# Patient Record
Sex: FEMALE | Race: BLACK OR AFRICAN AMERICAN | NOT HISPANIC OR LATINO | Employment: OTHER | URBAN - METROPOLITAN AREA
[De-identification: names, ages, dates, MRNs, and addresses within clinical notes are randomized per-mention and may not be internally consistent; named-entity substitution may affect disease eponyms.]

---

## 2020-08-16 ENCOUNTER — NURSE TRIAGE (OUTPATIENT)
Dept: ADMINISTRATIVE | Facility: CLINIC | Age: 75
End: 2020-08-16

## 2020-08-16 NOTE — TELEPHONE ENCOUNTER
The patient called about covid 19 testing.     Reason for Disposition   COVID-19 Testing, questions about    Additional Information   Negative: SEVERE difficulty breathing (e.g., struggling for each breath, speaks in single words)   Negative: Difficult to awaken or acting confused (e.g., disoriented, slurred speech)   Negative: Bluish (or gray) lips or face now   Negative: Shock suspected (e.g., cold/pale/clammy skin, too weak to stand, low BP, rapid pulse)   Negative: Sounds like a life-threatening emergency to the triager   Negative: [1] COVID-19 exposure AND [2] NO symptoms   Negative: COVID-19 and Breastfeeding, questions about   Negative: [1] Adult with possible COVID-19 symptoms AND [2] triager concerned about severity of symptoms or other causes   Negative: SEVERE or constant chest pain or pressure (Exception: mild central chest pain, present only when coughing)   Negative: MODERATE difficulty breathing (e.g., speaks in phrases, SOB even at rest, pulse 100-120)   Negative: Patient sounds very sick or weak to the triager   Negative: MILD difficulty breathing (e.g., minimal/no SOB at rest, SOB with walking, pulse <100)   Negative: Chest pain or pressure   Negative: Fever > 103 F (39.4 C)   Negative: [1] Fever > 101 F (38.3 C) AND [2] age > 60   Negative: [1] Fever > 100.0 F (37.8 C) AND [2] bedridden (e.g., nursing home patient, CVA, chronic illness, recovering from surgery)   Negative: HIGH RISK patient (e.g., age > 64 years, diabetes, heart or lung disease, weak immune system)   Negative: Fever present > 3 days (72 hours)   Negative: [1] Fever returns after gone for over 24 hours AND [2] symptoms worse or not improved   Negative: [1] Continuous (nonstop) coughing interferes with work or school AND [2] no improvement using cough treatment per protocol   Negative: [1] COVID-19 infection suspected by caller or triager AND [2] mild symptoms (cough, fever, or others) AND [3] no complications  or SOB   Negative: Cough present > 3 weeks   Negative: [1] COVID-19 diagnosed by positive lab test AND [2] mild symptoms (e.g., cough, fever, others) AND [3] no complications or SOB   Negative: [1] COVID-19 diagnosed by HCP (doctor, NP or PA) AND [2] mild symptoms (e.g., cough, fever, others) AND [3] no complications or SOB   Negative: COVID-19 Home Isolation, questions about    Protocols used: CORONAVIRUS (COVID-19) DIAGNOSED OR [id number]

## 2020-11-25 ENCOUNTER — HOSPITAL ENCOUNTER (EMERGENCY)
Facility: HOSPITAL | Age: 75
Discharge: HOME | End: 2020-11-25
Attending: EMERGENCY MEDICINE
Payer: MEDICARE

## 2020-11-25 VITALS
DIASTOLIC BLOOD PRESSURE: 110 MMHG | SYSTOLIC BLOOD PRESSURE: 176 MMHG | TEMPERATURE: 98 F | HEART RATE: 60 BPM | BODY MASS INDEX: 33.12 KG/M2 | HEIGHT: 67 IN | OXYGEN SATURATION: 98 % | RESPIRATION RATE: 18 BRPM | WEIGHT: 211 LBS

## 2020-11-25 DIAGNOSIS — S39.012A STRAIN OF LUMBAR REGION, INITIAL ENCOUNTER: ICD-10-CM

## 2020-11-25 DIAGNOSIS — M54.9 BACK PAIN, UNSPECIFIED BACK LOCATION, UNSPECIFIED BACK PAIN LATERALITY, UNSPECIFIED CHRONICITY: Primary | ICD-10-CM

## 2020-11-25 PROCEDURE — 96372 THER/PROPH/DIAG INJ SC/IM: CPT

## 2020-11-25 PROCEDURE — 25000003 PHARM REV CODE 250: Performed by: EMERGENCY MEDICINE

## 2020-11-25 PROCEDURE — 99284 EMERGENCY DEPT VISIT MOD MDM: CPT | Mod: 25

## 2020-11-25 PROCEDURE — 63600175 PHARM REV CODE 636 W HCPCS: Performed by: EMERGENCY MEDICINE

## 2020-11-25 RX ORDER — ONDANSETRON 4 MG/1
8 TABLET, ORALLY DISINTEGRATING ORAL
Status: COMPLETED | OUTPATIENT
Start: 2020-11-25 | End: 2020-11-25

## 2020-11-25 RX ORDER — HYDROMORPHONE HYDROCHLORIDE 1 MG/ML
1 INJECTION, SOLUTION INTRAMUSCULAR; INTRAVENOUS; SUBCUTANEOUS
Status: COMPLETED | OUTPATIENT
Start: 2020-11-25 | End: 2020-11-25

## 2020-11-25 RX ADMIN — ONDANSETRON 8 MG: 4 TABLET, ORALLY DISINTEGRATING ORAL at 12:11

## 2020-11-25 RX ADMIN — HYDROMORPHONE HYDROCHLORIDE 1 MG: 1 INJECTION, SOLUTION INTRAMUSCULAR; INTRAVENOUS; SUBCUTANEOUS at 12:11

## 2020-11-25 NOTE — ED PROVIDER NOTES
Encounter Date: 11/25/2020       History     Chief Complaint   Patient presents with    Back Pain     left lower back pain.  Saw my doctor Monday and was given some pain pills, Flexeril, and steroid but the pain is unbearable.     75-year-old black female pain of low back pain for a week now.  Was seen by her primary care physician, diagnosed with muscular back pain, a given pain medication.  Patient still complaining of pain.  Denies any trauma.  Denies any fever.  Denies any dysuria hematuria        Review of patient's allergies indicates:   Allergen Reactions    Penicillins     Sulfa (sulfonamide antibiotics)      Past Medical History:   Diagnosis Date    Asthma     Diabetes mellitus, type 2     Hypertension      Past Surgical History:   Procedure Laterality Date    HYSTERECTOMY      right knee surgery       Family History   Problem Relation Age of Onset    No Known Problems Mother     No Known Problems Father      Social History     Tobacco Use    Smoking status: Never Smoker   Substance Use Topics    Alcohol use: No    Drug use: No     Review of Systems   Constitutional: Negative for fever.   HENT: Negative for sore throat.    Respiratory: Negative for shortness of breath.    Cardiovascular: Negative for chest pain.   Gastrointestinal: Negative for nausea.   Genitourinary: Negative for dysuria.   Musculoskeletal: Positive for back pain.   Skin: Negative for rash.   Neurological: Negative for weakness.   Hematological: Does not bruise/bleed easily.   All other systems reviewed and are negative.      Physical Exam     Initial Vitals [11/25/20 1228]   BP Pulse Resp Temp SpO2   (!) 176/110 60 20 98.2 °F (36.8 °C) 98 %      MAP       --         Physical Exam    Nursing note and vitals reviewed.  Constitutional: She appears well-developed and well-nourished. She is not diaphoretic. No distress.   HENT:   Head: Normocephalic and atraumatic.   Eyes: Conjunctivae and EOM are normal. Pupils are equal, round,  and reactive to light.   Neck: Normal range of motion. Neck supple.   Cardiovascular: Normal rate, regular rhythm, normal heart sounds and intact distal pulses.   No murmur heard.  Pulmonary/Chest: Breath sounds normal. No respiratory distress. She has no wheezes. She has no rhonchi. She has no rales. She exhibits no tenderness.   Abdominal: Soft. Bowel sounds are normal.   Musculoskeletal: Normal range of motion. Tenderness present. No edema.      Comments: Bilateral paraspinous muscle tenderness low back with palpation.  Neurovascular intact, no distal cyanosis.  No sensory deficit.  Steady gait   Neurological: She is alert and oriented to person, place, and time. She has normal strength and normal reflexes. No cranial nerve deficit. GCS score is 15. GCS eye subscore is 4. GCS verbal subscore is 5. GCS motor subscore is 6.   Skin: Skin is warm and dry. Capillary refill takes less than 2 seconds.         ED Course   Procedures  Labs Reviewed - No data to display       Imaging Results    None          Medical Decision Making:   Differential Diagnosis:   Muscular low back pain                             Clinical Impression:     ICD-10-CM ICD-9-CM   1. Back pain, unspecified back location, unspecified back pain laterality, unspecified chronicity  M54.9 724.5   2. Strain of lumbar region, initial encounter  S39.012A 847.2                          ED Disposition Condition    Discharge Stable        ED Prescriptions     None        Follow-up Information     Follow up With Specialties Details Why Contact Info Additional Information    Primary care physician  In 2 days       [facility] Emergency Medicine  If symptoms worsen [address] [facility] [address]  [phone] Floor [address]                                       Craig Winters MD  11/25/20 2415

## 2020-12-14 ENCOUNTER — LAB VISIT (OUTPATIENT)
Dept: LAB | Facility: HOSPITAL | Age: 75
End: 2020-12-14
Attending: FAMILY MEDICINE
Payer: MEDICARE

## 2020-12-14 DIAGNOSIS — I10 ESSENTIAL HYPERTENSION, MALIGNANT: Primary | ICD-10-CM

## 2020-12-14 DIAGNOSIS — M54.00 PANNICULITIS AFFECTING BACK: ICD-10-CM

## 2020-12-14 DIAGNOSIS — R82.79 URINE CULTURE POSITIVE: ICD-10-CM

## 2020-12-14 LAB
ANION GAP SERPL CALC-SCNC: 7 MMOL/L (ref 8–16)
BASOPHILS # BLD AUTO: 0.07 K/UL (ref 0–0.2)
BASOPHILS NFR BLD: 0.7 % (ref 0–1.9)
BUN SERPL-MCNC: 15 MG/DL (ref 8–23)
CALCIUM SERPL-MCNC: 9.6 MG/DL (ref 8.7–10.5)
CHLORIDE SERPL-SCNC: 105 MMOL/L (ref 95–110)
CO2 SERPL-SCNC: 29 MMOL/L (ref 23–29)
CREAT SERPL-MCNC: 1.4 MG/DL (ref 0.5–1.4)
DIFFERENTIAL METHOD: ABNORMAL
EOSINOPHIL # BLD AUTO: 0.5 K/UL (ref 0–0.5)
EOSINOPHIL NFR BLD: 5 % (ref 0–8)
ERYTHROCYTE [DISTWIDTH] IN BLOOD BY AUTOMATED COUNT: 15.7 % (ref 11.5–14.5)
EST. GFR  (AFRICAN AMERICAN): 42.7 ML/MIN/1.73 M^2
EST. GFR  (NON AFRICAN AMERICAN): 37 ML/MIN/1.73 M^2
GLUCOSE SERPL-MCNC: 87 MG/DL (ref 70–110)
HCT VFR BLD AUTO: 40.9 % (ref 37–48.5)
HGB BLD-MCNC: 13.4 G/DL (ref 12–16)
IMM GRANULOCYTES # BLD AUTO: 0.02 K/UL (ref 0–0.04)
IMM GRANULOCYTES NFR BLD AUTO: 0.2 % (ref 0–0.5)
LYMPHOCYTES # BLD AUTO: 2.2 K/UL (ref 1–4.8)
LYMPHOCYTES NFR BLD: 21.2 % (ref 18–48)
MCH RBC QN AUTO: 29.5 PG (ref 27–31)
MCHC RBC AUTO-ENTMCNC: 32.8 G/DL (ref 32–36)
MCV RBC AUTO: 90 FL (ref 82–98)
MONOCYTES # BLD AUTO: 0.6 K/UL (ref 0.3–1)
MONOCYTES NFR BLD: 5.8 % (ref 4–15)
NEUTROPHILS # BLD AUTO: 6.9 K/UL (ref 1.8–7.7)
NEUTROPHILS NFR BLD: 67.1 % (ref 38–73)
NRBC BLD-RTO: 0 /100 WBC
PLATELET # BLD AUTO: 287 K/UL (ref 150–350)
PMV BLD AUTO: 10.6 FL (ref 9.2–12.9)
POTASSIUM SERPL-SCNC: 3.6 MMOL/L (ref 3.5–5.1)
RBC # BLD AUTO: 4.54 M/UL (ref 4–5.4)
SODIUM SERPL-SCNC: 141 MMOL/L (ref 136–145)
WBC # BLD AUTO: 10.32 K/UL (ref 3.9–12.7)

## 2020-12-14 PROCEDURE — 36415 COLL VENOUS BLD VENIPUNCTURE: CPT

## 2020-12-14 PROCEDURE — 80048 BASIC METABOLIC PNL TOTAL CA: CPT

## 2020-12-14 PROCEDURE — 87088 URINE BACTERIA CULTURE: CPT

## 2020-12-14 PROCEDURE — 87186 SC STD MICRODIL/AGAR DIL: CPT

## 2020-12-14 PROCEDURE — 87077 CULTURE AEROBIC IDENTIFY: CPT

## 2020-12-14 PROCEDURE — 87086 URINE CULTURE/COLONY COUNT: CPT

## 2020-12-14 PROCEDURE — 85025 COMPLETE CBC W/AUTO DIFF WBC: CPT

## 2020-12-17 LAB — BACTERIA UR CULT: ABNORMAL

## 2021-01-15 ENCOUNTER — LAB VISIT (OUTPATIENT)
Dept: LAB | Facility: HOSPITAL | Age: 76
End: 2021-01-15
Attending: FAMILY MEDICINE
Payer: MEDICARE

## 2021-01-15 DIAGNOSIS — N17.9 ACUTE KIDNEY FAILURE, UNSPECIFIED: Primary | ICD-10-CM

## 2021-01-15 LAB
ANION GAP SERPL CALC-SCNC: 13 MMOL/L (ref 8–16)
BUN SERPL-MCNC: 13 MG/DL (ref 8–23)
CALCIUM SERPL-MCNC: 9.4 MG/DL (ref 8.7–10.5)
CHLORIDE SERPL-SCNC: 103 MMOL/L (ref 95–110)
CO2 SERPL-SCNC: 29 MMOL/L (ref 23–29)
CREAT SERPL-MCNC: 1.2 MG/DL (ref 0.5–1.4)
EST. GFR  (AFRICAN AMERICAN): 51.4 ML/MIN/1.73 M^2
EST. GFR  (NON AFRICAN AMERICAN): 44.6 ML/MIN/1.73 M^2
GLUCOSE SERPL-MCNC: 90 MG/DL (ref 70–110)
POTASSIUM SERPL-SCNC: 3.4 MMOL/L (ref 3.5–5.1)
SODIUM SERPL-SCNC: 145 MMOL/L (ref 136–145)

## 2021-01-15 PROCEDURE — 36415 COLL VENOUS BLD VENIPUNCTURE: CPT

## 2021-01-15 PROCEDURE — 80048 BASIC METABOLIC PNL TOTAL CA: CPT

## 2021-03-05 ENCOUNTER — LAB VISIT (OUTPATIENT)
Dept: LAB | Facility: HOSPITAL | Age: 76
End: 2021-03-05
Attending: FAMILY MEDICINE
Payer: MEDICARE

## 2021-03-05 DIAGNOSIS — E87.6 HYPOPOTASSEMIA: ICD-10-CM

## 2021-03-05 DIAGNOSIS — E55.9 AVITAMINOSIS D: ICD-10-CM

## 2021-03-05 DIAGNOSIS — E88.810 DYSMETABOLIC SYNDROME X: ICD-10-CM

## 2021-03-05 DIAGNOSIS — E11.9 DIABETES MELLITUS WITHOUT COMPLICATION: ICD-10-CM

## 2021-03-05 DIAGNOSIS — N32.81 DETRUSOR INSTABILITY OF BLADDER: ICD-10-CM

## 2021-03-05 DIAGNOSIS — I10 ESSENTIAL HYPERTENSION, MALIGNANT: Primary | ICD-10-CM

## 2021-03-05 LAB
25(OH)D3+25(OH)D2 SERPL-MCNC: 28 NG/ML (ref 30–96)
ALBUMIN SERPL BCP-MCNC: 3.8 G/DL (ref 3.5–5.2)
ALP SERPL-CCNC: 64 U/L (ref 55–135)
ALT SERPL W/O P-5'-P-CCNC: 11 U/L (ref 10–44)
ANION GAP SERPL CALC-SCNC: 6 MMOL/L (ref 8–16)
AST SERPL-CCNC: 7 U/L (ref 10–40)
BASOPHILS # BLD AUTO: 0.07 K/UL (ref 0–0.2)
BASOPHILS NFR BLD: 0.8 % (ref 0–1.9)
BILIRUB SERPL-MCNC: 0.5 MG/DL (ref 0.1–1)
BUN SERPL-MCNC: 11 MG/DL (ref 8–23)
CALCIUM SERPL-MCNC: 9.7 MG/DL (ref 8.7–10.5)
CHLORIDE SERPL-SCNC: 109 MMOL/L (ref 95–110)
CHOLEST SERPL-MCNC: 172 MG/DL (ref 120–199)
CHOLEST/HDLC SERPL: 2.3 {RATIO} (ref 2–5)
CO2 SERPL-SCNC: 31 MMOL/L (ref 23–29)
CREAT SERPL-MCNC: 1.1 MG/DL (ref 0.5–1.4)
CRP SERPL-MCNC: <0.29 MG/DL (ref 0–0.75)
DIFFERENTIAL METHOD: ABNORMAL
EOSINOPHIL # BLD AUTO: 0.6 K/UL (ref 0–0.5)
EOSINOPHIL NFR BLD: 6.9 % (ref 0–8)
ERYTHROCYTE [DISTWIDTH] IN BLOOD BY AUTOMATED COUNT: 15.2 % (ref 11.5–14.5)
ERYTHROCYTE [SEDIMENTATION RATE] IN BLOOD: 14 MM/HR (ref 0–20)
EST. GFR  (AFRICAN AMERICAN): 56.8 ML/MIN/1.73 M^2
EST. GFR  (NON AFRICAN AMERICAN): 49.2 ML/MIN/1.73 M^2
ESTIMATED AVG GLUCOSE: 108 MG/DL (ref 68–131)
GLUCOSE SERPL-MCNC: 86 MG/DL (ref 70–110)
HBA1C MFR BLD: 5.4 % (ref 4–5.6)
HCT VFR BLD AUTO: 39.4 % (ref 37–48.5)
HDLC SERPL-MCNC: 74 MG/DL (ref 40–75)
HDLC SERPL: 43 % (ref 20–50)
HGB BLD-MCNC: 12.9 G/DL (ref 12–16)
IMM GRANULOCYTES # BLD AUTO: 0.01 K/UL (ref 0–0.04)
IMM GRANULOCYTES NFR BLD AUTO: 0.1 % (ref 0–0.5)
LDLC SERPL CALC-MCNC: 74.4 MG/DL (ref 63–159)
LYMPHOCYTES # BLD AUTO: 2.3 K/UL (ref 1–4.8)
LYMPHOCYTES NFR BLD: 26.8 % (ref 18–48)
MCH RBC QN AUTO: 30.4 PG (ref 27–31)
MCHC RBC AUTO-ENTMCNC: 32.7 G/DL (ref 32–36)
MCV RBC AUTO: 93 FL (ref 82–98)
MONOCYTES # BLD AUTO: 0.6 K/UL (ref 0.3–1)
MONOCYTES NFR BLD: 7.3 % (ref 4–15)
NEUTROPHILS # BLD AUTO: 4.9 K/UL (ref 1.8–7.7)
NEUTROPHILS NFR BLD: 58.1 % (ref 38–73)
NONHDLC SERPL-MCNC: 98 MG/DL
NRBC BLD-RTO: 0 /100 WBC
PLATELET # BLD AUTO: 320 K/UL (ref 150–350)
PMV BLD AUTO: 10.2 FL (ref 9.2–12.9)
POTASSIUM SERPL-SCNC: 3.6 MMOL/L (ref 3.5–5.1)
PROT SERPL-MCNC: 7.8 G/DL (ref 6–8.4)
RBC # BLD AUTO: 4.24 M/UL (ref 4–5.4)
SODIUM SERPL-SCNC: 146 MMOL/L (ref 136–145)
T4 FREE SERPL-MCNC: 1.04 NG/DL (ref 0.71–1.51)
TRIGL SERPL-MCNC: 118 MG/DL (ref 30–150)
TSH SERPL DL<=0.005 MIU/L-ACNC: 2.02 UIU/ML (ref 0.4–4)
WBC # BLD AUTO: 8.4 K/UL (ref 3.9–12.7)

## 2021-03-05 PROCEDURE — 84439 ASSAY OF FREE THYROXINE: CPT | Performed by: FAMILY MEDICINE

## 2021-03-05 PROCEDURE — 80053 COMPREHEN METABOLIC PANEL: CPT | Performed by: FAMILY MEDICINE

## 2021-03-05 PROCEDURE — 82306 VITAMIN D 25 HYDROXY: CPT | Performed by: FAMILY MEDICINE

## 2021-03-05 PROCEDURE — 83036 HEMOGLOBIN GLYCOSYLATED A1C: CPT | Performed by: FAMILY MEDICINE

## 2021-03-05 PROCEDURE — 86140 C-REACTIVE PROTEIN: CPT | Performed by: FAMILY MEDICINE

## 2021-03-05 PROCEDURE — 80061 LIPID PANEL: CPT | Performed by: FAMILY MEDICINE

## 2021-03-05 PROCEDURE — 36415 COLL VENOUS BLD VENIPUNCTURE: CPT | Performed by: FAMILY MEDICINE

## 2021-03-05 PROCEDURE — 85651 RBC SED RATE NONAUTOMATED: CPT | Performed by: FAMILY MEDICINE

## 2021-03-05 PROCEDURE — 84443 ASSAY THYROID STIM HORMONE: CPT | Performed by: FAMILY MEDICINE

## 2021-03-05 PROCEDURE — 85025 COMPLETE CBC W/AUTO DIFF WBC: CPT | Performed by: FAMILY MEDICINE

## 2021-04-21 ENCOUNTER — ANESTHESIA EVENT (OUTPATIENT)
Dept: SURGERY | Facility: HOSPITAL | Age: 76
End: 2021-04-21
Payer: MEDICARE

## 2021-04-21 VITALS — HEIGHT: 66 IN | WEIGHT: 205 LBS | BODY MASS INDEX: 32.95 KG/M2

## 2021-04-23 ENCOUNTER — HOSPITAL ENCOUNTER (OUTPATIENT)
Dept: PREADMISSION TESTING | Facility: HOSPITAL | Age: 76
Discharge: HOME | End: 2021-04-23
Attending: OPHTHALMOLOGY
Payer: MEDICARE

## 2021-04-23 DIAGNOSIS — Z01.818 PRE-OP TESTING: ICD-10-CM

## 2021-04-27 ENCOUNTER — HOSPITAL ENCOUNTER (OUTPATIENT)
Dept: PREADMISSION TESTING | Facility: HOSPITAL | Age: 76
Discharge: HOME | End: 2021-04-27
Attending: OPHTHALMOLOGY
Payer: MEDICARE

## 2021-04-27 DIAGNOSIS — Z01.818 PRE-OP TESTING: ICD-10-CM

## 2021-04-27 LAB — SARS-COV-2 RDRP RESP QL NAA+PROBE: NEGATIVE

## 2021-04-27 PROCEDURE — U0002 COVID-19 LAB TEST NON-CDC: HCPCS | Performed by: OPHTHALMOLOGY

## 2021-04-28 ENCOUNTER — HOSPITAL ENCOUNTER (OUTPATIENT)
Facility: HOSPITAL | Age: 76
Discharge: HOME | End: 2021-04-28
Attending: OPHTHALMOLOGY | Admitting: OPHTHALMOLOGY
Payer: MEDICARE

## 2021-04-28 ENCOUNTER — ANESTHESIA (OUTPATIENT)
Dept: SURGERY | Facility: HOSPITAL | Age: 76
End: 2021-04-28
Payer: MEDICARE

## 2021-04-28 VITALS
TEMPERATURE: 98 F | RESPIRATION RATE: 16 BRPM | OXYGEN SATURATION: 94 % | SYSTOLIC BLOOD PRESSURE: 141 MMHG | HEART RATE: 52 BPM | DIASTOLIC BLOOD PRESSURE: 70 MMHG

## 2021-04-28 DIAGNOSIS — H25.11 NUCLEAR SCLEROTIC CATARACT OF RIGHT EYE: Primary | ICD-10-CM

## 2021-04-28 LAB — POCT GLUCOSE: 93 MG/DL (ref 70–110)

## 2021-04-28 PROCEDURE — 37000008 HC ANESTHESIA 1ST 15 MINUTES: Performed by: OPHTHALMOLOGY

## 2021-04-28 PROCEDURE — 37000009 HC ANESTHESIA EA ADD 15 MINS: Performed by: OPHTHALMOLOGY

## 2021-04-28 PROCEDURE — V2632 POST CHMBR INTRAOCULAR LENS: HCPCS | Performed by: OPHTHALMOLOGY

## 2021-04-28 PROCEDURE — 36000707: Performed by: OPHTHALMOLOGY

## 2021-04-28 PROCEDURE — 63600175 PHARM REV CODE 636 W HCPCS: Performed by: OPHTHALMOLOGY

## 2021-04-28 PROCEDURE — 25000003 PHARM REV CODE 250: Performed by: OPHTHALMOLOGY

## 2021-04-28 PROCEDURE — 71000015 HC POSTOP RECOV 1ST HR: Performed by: OPHTHALMOLOGY

## 2021-04-28 PROCEDURE — 25000242 PHARM REV CODE 250 ALT 637 W/ HCPCS: Performed by: ANESTHESIOLOGY

## 2021-04-28 PROCEDURE — 36000706: Performed by: OPHTHALMOLOGY

## 2021-04-28 DEVICE — IMPLANTABLE DEVICE: Type: IMPLANTABLE DEVICE | Site: EYE | Status: FUNCTIONAL

## 2021-04-28 RX ORDER — PHENYLEPHRINE HYDROCHLORIDE 25 MG/ML
1 SOLUTION/ DROPS OPHTHALMIC
Status: DISPENSED | OUTPATIENT
Start: 2021-04-28 | End: 2021-04-28

## 2021-04-28 RX ORDER — MIDAZOLAM HCL 2 MG/ML
4 SYRUP ORAL ONCE
Status: COMPLETED | OUTPATIENT
Start: 2021-04-28 | End: 2021-04-28

## 2021-04-28 RX ORDER — CIPROFLOXACIN HYDROCHLORIDE 3 MG/ML
1 SOLUTION/ DROPS OPHTHALMIC
Status: DISPENSED | OUTPATIENT
Start: 2021-04-28 | End: 2021-04-28

## 2021-04-28 RX ORDER — TETRACAINE HYDROCHLORIDE 5 MG/ML
SOLUTION OPHTHALMIC
Status: DISCONTINUED | OUTPATIENT
Start: 2021-04-28 | End: 2021-04-28 | Stop reason: HOSPADM

## 2021-04-28 RX ORDER — LIDOCAINE HYDROCHLORIDE 20 MG/ML
INJECTION, SOLUTION EPIDURAL; INFILTRATION; INTRACAUDAL; PERINEURAL
Status: DISCONTINUED | OUTPATIENT
Start: 2021-04-28 | End: 2021-04-28 | Stop reason: HOSPADM

## 2021-04-28 RX ORDER — TROPICAMIDE 10 MG/ML
1 SOLUTION/ DROPS OPHTHALMIC
Status: DISPENSED | OUTPATIENT
Start: 2021-04-28 | End: 2021-04-28

## 2021-04-28 RX ORDER — TETRACAINE HYDROCHLORIDE 5 MG/ML
1 SOLUTION OPHTHALMIC
Status: DISPENSED | OUTPATIENT
Start: 2021-04-28 | End: 2021-04-28

## 2021-04-28 RX ADMIN — PHENYLEPHRINE HYDROCHLORIDE 1 DROP: 25 SOLUTION/ DROPS OPHTHALMIC at 07:04

## 2021-04-28 RX ADMIN — TROPICAMIDE 1 DROP: 10 SOLUTION/ DROPS OPHTHALMIC at 07:04

## 2021-04-28 RX ADMIN — CIPROFLOXACIN 1 DROP: 3 SOLUTION OPHTHALMIC at 07:04

## 2021-04-28 RX ADMIN — MIDAZOLAM HYDROCHLORIDE 4 MG: 2 SYRUP ORAL at 08:04

## 2021-04-28 RX ADMIN — PHENYLEPHRINE HYDROCHLORIDE 1 DROP: 25 SOLUTION/ DROPS OPHTHALMIC at 08:04

## 2021-04-28 RX ADMIN — TETRACAINE HYDROCHLORIDE 1 DROP: 5 SOLUTION OPHTHALMIC at 08:04

## 2021-04-28 RX ADMIN — TROPICAMIDE 1 DROP: 10 SOLUTION/ DROPS OPHTHALMIC at 08:04

## 2021-04-28 RX ADMIN — TETRACAINE HYDROCHLORIDE 1 DROP: 5 SOLUTION OPHTHALMIC at 07:04

## 2021-04-28 RX ADMIN — CIPROFLOXACIN 1 DROP: 3 SOLUTION OPHTHALMIC at 08:04

## 2022-05-26 ENCOUNTER — HOSPITAL ENCOUNTER (OUTPATIENT)
Dept: RADIOLOGY | Facility: HOSPITAL | Age: 77
Discharge: HOME | End: 2022-05-26
Attending: NURSE PRACTITIONER
Payer: MEDICARE

## 2022-05-26 VITALS — WEIGHT: 205 LBS | HEIGHT: 66 IN | BODY MASS INDEX: 32.95 KG/M2

## 2022-05-26 DIAGNOSIS — Z13.820 SCREENING FOR OSTEOPOROSIS: ICD-10-CM

## 2022-05-26 DIAGNOSIS — Z78.0 POSTMENOPAUSAL: ICD-10-CM

## 2022-05-26 DIAGNOSIS — Z12.31 SCREENING MAMMOGRAM FOR BREAST CANCER: ICD-10-CM

## 2022-05-26 PROCEDURE — 77067 SCR MAMMO BI INCL CAD: CPT | Mod: TC

## 2022-05-26 PROCEDURE — 77080 DXA BONE DENSITY AXIAL: CPT | Mod: TC

## 2022-07-21 ENCOUNTER — LAB VISIT (OUTPATIENT)
Dept: LAB | Facility: HOSPITAL | Age: 77
End: 2022-07-21
Attending: INTERNAL MEDICINE
Payer: MEDICARE

## 2022-07-21 DIAGNOSIS — R06.02 SHORTNESS OF BREATH: Primary | ICD-10-CM

## 2022-07-21 DIAGNOSIS — R06.9 ABNORMAL RESPIRATORY RATE: ICD-10-CM

## 2022-07-21 LAB
ALBUMIN SERPL BCP-MCNC: 3.4 G/DL (ref 3.5–5.2)
ALP SERPL-CCNC: 60 U/L (ref 55–135)
ALT SERPL W/O P-5'-P-CCNC: 12 U/L (ref 10–44)
ANION GAP SERPL CALC-SCNC: 7 MMOL/L (ref 8–16)
AST SERPL-CCNC: 8 U/L (ref 10–40)
BILIRUB SERPL-MCNC: 0.5 MG/DL (ref 0.1–1)
BUN SERPL-MCNC: 10 MG/DL (ref 8–23)
CALCIUM SERPL-MCNC: 9.1 MG/DL (ref 8.7–10.5)
CHLORIDE SERPL-SCNC: 106 MMOL/L (ref 95–110)
CHOLEST SERPL-MCNC: 124 MG/DL (ref 120–199)
CHOLEST/HDLC SERPL: 1.9 {RATIO} (ref 2–5)
CO2 SERPL-SCNC: 29 MMOL/L (ref 23–29)
CREAT SERPL-MCNC: 1.1 MG/DL (ref 0.5–1.4)
D DIMER PPP IA.FEU-MCNC: 0.59 MG/L FEU
ERYTHROCYTE [SEDIMENTATION RATE] IN BLOOD: 12 MM/HR (ref 0–20)
EST. GFR  (AFRICAN AMERICAN): 56.4 ML/MIN/1.73 M^2
EST. GFR  (NON AFRICAN AMERICAN): 48.9 ML/MIN/1.73 M^2
GLUCOSE SERPL-MCNC: 92 MG/DL (ref 70–110)
HDLC SERPL-MCNC: 66 MG/DL (ref 40–75)
HDLC SERPL: 53.2 % (ref 20–50)
LDLC SERPL CALC-MCNC: 43.8 MG/DL (ref 63–159)
NONHDLC SERPL-MCNC: 58 MG/DL
NT-PROBNP SERPL-MCNC: 1927 PG/ML (ref 5–1800)
POTASSIUM SERPL-SCNC: 3.4 MMOL/L (ref 3.5–5.1)
PROT SERPL-MCNC: 7.5 G/DL (ref 6–8.4)
SODIUM SERPL-SCNC: 142 MMOL/L (ref 136–145)
T4 FREE SERPL-MCNC: 1.05 NG/DL (ref 0.71–1.51)
TRIGL SERPL-MCNC: 71 MG/DL (ref 30–150)
TSH SERPL DL<=0.005 MIU/L-ACNC: 1.38 UIU/ML (ref 0.4–4)

## 2022-07-21 PROCEDURE — 85651 RBC SED RATE NONAUTOMATED: CPT | Performed by: INTERNAL MEDICINE

## 2022-07-21 PROCEDURE — 80053 COMPREHEN METABOLIC PANEL: CPT | Performed by: INTERNAL MEDICINE

## 2022-07-21 PROCEDURE — 80061 LIPID PANEL: CPT | Mod: GA | Performed by: INTERNAL MEDICINE

## 2022-07-21 PROCEDURE — 83880 ASSAY OF NATRIURETIC PEPTIDE: CPT | Performed by: INTERNAL MEDICINE

## 2022-07-21 PROCEDURE — 85379 FIBRIN DEGRADATION QUANT: CPT | Performed by: INTERNAL MEDICINE

## 2022-07-21 PROCEDURE — 36415 COLL VENOUS BLD VENIPUNCTURE: CPT | Performed by: INTERNAL MEDICINE

## 2022-07-21 PROCEDURE — 84439 ASSAY OF FREE THYROXINE: CPT | Mod: GA | Performed by: INTERNAL MEDICINE

## 2022-07-21 PROCEDURE — 84443 ASSAY THYROID STIM HORMONE: CPT | Mod: GA | Performed by: INTERNAL MEDICINE

## 2022-08-09 DIAGNOSIS — R07.9 CHEST PAIN, UNSPECIFIED: Primary | ICD-10-CM

## 2022-08-10 ENCOUNTER — HOSPITAL ENCOUNTER (OUTPATIENT)
Dept: RADIOLOGY | Facility: HOSPITAL | Age: 77
Discharge: HOME | End: 2022-08-10
Attending: INTERNAL MEDICINE
Payer: MEDICARE

## 2022-08-10 DIAGNOSIS — R07.9 CHEST PAIN, UNSPECIFIED: ICD-10-CM

## 2022-08-10 PROCEDURE — 75574 CT ANGIO HRT W/3D IMAGE: CPT | Mod: TC

## 2022-08-10 PROCEDURE — 25500020 PHARM REV CODE 255: Performed by: INTERNAL MEDICINE

## 2022-08-10 RX ADMIN — IOHEXOL 100 ML: 350 INJECTION, SOLUTION INTRAVENOUS at 09:08

## 2022-08-25 ENCOUNTER — LAB VISIT (OUTPATIENT)
Dept: LAB | Facility: HOSPITAL | Age: 77
End: 2022-08-25
Attending: INTERNAL MEDICINE
Payer: MEDICARE

## 2022-08-25 DIAGNOSIS — I25.10 CORONARY ATHEROSCLEROSIS OF NATIVE CORONARY ARTERY: Primary | ICD-10-CM

## 2022-08-25 DIAGNOSIS — R94.31 NONSPECIFIC ABNORMAL ELECTROCARDIOGRAM (ECG) (EKG): ICD-10-CM

## 2022-08-25 DIAGNOSIS — I27.0 PRIMARY PULMONARY HYPERTENSION: ICD-10-CM

## 2022-08-25 DIAGNOSIS — R06.02 SHORTNESS OF BREATH: Primary | ICD-10-CM

## 2022-08-25 DIAGNOSIS — I48.20 CHRONIC ATRIAL FIBRILLATION: ICD-10-CM

## 2022-08-25 LAB
ALBUMIN SERPL BCP-MCNC: 3.6 G/DL (ref 3.5–5.2)
ALP SERPL-CCNC: 63 U/L (ref 55–135)
ALT SERPL W/O P-5'-P-CCNC: 13 U/L (ref 10–44)
ANION GAP SERPL CALC-SCNC: 7 MMOL/L (ref 8–16)
AST SERPL-CCNC: 7 U/L (ref 10–40)
BILIRUB SERPL-MCNC: 0.5 MG/DL (ref 0.1–1)
BUN SERPL-MCNC: 18 MG/DL (ref 8–23)
CALCIUM SERPL-MCNC: 9.6 MG/DL (ref 8.7–10.5)
CHLORIDE SERPL-SCNC: 100 MMOL/L (ref 95–110)
CO2 SERPL-SCNC: 29 MMOL/L (ref 23–29)
CREAT SERPL-MCNC: 1.3 MG/DL (ref 0.5–1.4)
EST. GFR  (NO RACE VARIABLE): 42.6 ML/MIN/1.73 M^2
ESTIMATED AVG GLUCOSE: 126 MG/DL (ref 68–131)
GLUCOSE SERPL-MCNC: 84 MG/DL (ref 70–110)
HBA1C MFR BLD: 6 % (ref 4–5.6)
NT-PROBNP SERPL-MCNC: 741 PG/ML (ref 5–1800)
POTASSIUM SERPL-SCNC: 3.5 MMOL/L (ref 3.5–5.1)
PROT SERPL-MCNC: 7.7 G/DL (ref 6–8.4)
SODIUM SERPL-SCNC: 136 MMOL/L (ref 136–145)

## 2022-08-25 PROCEDURE — 83036 HEMOGLOBIN GLYCOSYLATED A1C: CPT | Performed by: INTERNAL MEDICINE

## 2022-08-25 PROCEDURE — 83880 ASSAY OF NATRIURETIC PEPTIDE: CPT | Performed by: INTERNAL MEDICINE

## 2022-08-25 PROCEDURE — 80053 COMPREHEN METABOLIC PANEL: CPT | Performed by: INTERNAL MEDICINE

## 2022-08-25 PROCEDURE — 36415 COLL VENOUS BLD VENIPUNCTURE: CPT | Performed by: INTERNAL MEDICINE

## 2022-08-31 ENCOUNTER — CLINICAL SUPPORT (OUTPATIENT)
Dept: CARDIOLOGY | Facility: HOSPITAL | Age: 77
End: 2022-08-31
Attending: INTERNAL MEDICINE
Payer: MEDICARE

## 2022-08-31 ENCOUNTER — HOSPITAL ENCOUNTER (OUTPATIENT)
Dept: RADIOLOGY | Facility: HOSPITAL | Age: 77
Discharge: HOME | End: 2022-08-31
Attending: INTERNAL MEDICINE
Payer: MEDICARE

## 2022-08-31 DIAGNOSIS — R94.31 NONSPECIFIC ABNORMAL ELECTROCARDIOGRAM (ECG) (EKG): ICD-10-CM

## 2022-08-31 DIAGNOSIS — R06.02 SHORTNESS OF BREATH: ICD-10-CM

## 2022-08-31 LAB
CV STRESS BASE HR: 57 BPM
DIASTOLIC BLOOD PRESSURE: 95 MMHG
NUC REST EJECTION FRACTION: 48
OHS CV CPX 85 PERCENT MAX PREDICTED HEART RATE MALE: 118
OHS CV CPX ESTIMATED METS: 3
OHS CV CPX MAX PREDICTED HEART RATE: 139
OHS CV CPX PATIENT IS FEMALE: 1
OHS CV CPX PATIENT IS MALE: 0
OHS CV CPX PEAK DIASTOLIC BLOOD PRESSURE: 102 MMHG
OHS CV CPX PEAK HEAR RATE: 106 BPM
OHS CV CPX PEAK RATE PRESSURE PRODUCT: NORMAL
OHS CV CPX PEAK SYSTOLIC BLOOD PRESSURE: 185 MMHG
OHS CV CPX PERCENT MAX PREDICTED HEART RATE ACHIEVED: 76
OHS CV CPX RATE PRESSURE PRODUCT PRESENTING: 8664
STRESS ECHO POST EXERCISE DUR MIN: 4 MINUTES
SYSTOLIC BLOOD PRESSURE: 152 MMHG

## 2022-08-31 PROCEDURE — A9500 TC99M SESTAMIBI: HCPCS

## 2022-08-31 PROCEDURE — 93017 CV STRESS TEST TRACING ONLY: CPT

## 2022-12-15 ENCOUNTER — HOSPITAL ENCOUNTER (EMERGENCY)
Facility: HOSPITAL | Age: 77
Discharge: HOME | End: 2022-12-15
Attending: STUDENT IN AN ORGANIZED HEALTH CARE EDUCATION/TRAINING PROGRAM
Payer: MEDICARE

## 2022-12-15 VITALS
HEART RATE: 60 BPM | SYSTOLIC BLOOD PRESSURE: 142 MMHG | DIASTOLIC BLOOD PRESSURE: 72 MMHG | TEMPERATURE: 98 F | OXYGEN SATURATION: 95 % | RESPIRATION RATE: 18 BRPM

## 2022-12-15 DIAGNOSIS — U07.1 COVID: Primary | ICD-10-CM

## 2022-12-15 LAB
ALBUMIN SERPL BCP-MCNC: 3.5 G/DL (ref 3.5–5.2)
ALP SERPL-CCNC: 67 U/L (ref 55–135)
ALT SERPL W/O P-5'-P-CCNC: 15 U/L (ref 10–44)
ANION GAP SERPL CALC-SCNC: 6 MMOL/L (ref 8–16)
AST SERPL-CCNC: 13 U/L (ref 10–40)
BASOPHILS # BLD AUTO: 0.05 K/UL (ref 0–0.2)
BASOPHILS NFR BLD: 0.7 % (ref 0–1.9)
BILIRUB SERPL-MCNC: 0.4 MG/DL (ref 0.1–1)
BUN SERPL-MCNC: 16 MG/DL (ref 8–23)
CALCIUM SERPL-MCNC: 8.8 MG/DL (ref 8.7–10.5)
CHLORIDE SERPL-SCNC: 99 MMOL/L (ref 95–110)
CO2 SERPL-SCNC: 30 MMOL/L (ref 23–29)
CREAT SERPL-MCNC: 1.5 MG/DL (ref 0.5–1.4)
CTP QC/QA: YES
CTP QC/QA: YES
DIFFERENTIAL METHOD: ABNORMAL
EOSINOPHIL # BLD AUTO: 0 K/UL (ref 0–0.5)
EOSINOPHIL NFR BLD: 0.4 % (ref 0–8)
ERYTHROCYTE [DISTWIDTH] IN BLOOD BY AUTOMATED COUNT: 14.6 % (ref 11.5–14.5)
EST. GFR  (NO RACE VARIABLE): 35.9 ML/MIN/1.73 M^2
GLUCOSE SERPL-MCNC: 133 MG/DL (ref 70–110)
HCT VFR BLD AUTO: 39 % (ref 37–48.5)
HGB BLD-MCNC: 13.2 G/DL (ref 12–16)
IMM GRANULOCYTES # BLD AUTO: 0.01 K/UL (ref 0–0.04)
IMM GRANULOCYTES NFR BLD AUTO: 0.1 % (ref 0–0.5)
LIPASE SERPL-CCNC: 90 U/L (ref 23–300)
LYMPHOCYTES # BLD AUTO: 1.6 K/UL (ref 1–4.8)
LYMPHOCYTES NFR BLD: 20.3 % (ref 18–48)
MCH RBC QN AUTO: 30.8 PG (ref 27–31)
MCHC RBC AUTO-ENTMCNC: 33.8 G/DL (ref 32–36)
MCV RBC AUTO: 91 FL (ref 82–98)
MONOCYTES # BLD AUTO: 0.8 K/UL (ref 0.3–1)
MONOCYTES NFR BLD: 10.7 % (ref 4–15)
NEUTROPHILS # BLD AUTO: 5.2 K/UL (ref 1.8–7.7)
NEUTROPHILS NFR BLD: 67.8 % (ref 38–73)
NRBC BLD-RTO: 0 /100 WBC
PLATELET # BLD AUTO: 223 K/UL (ref 150–450)
PMV BLD AUTO: 10.5 FL (ref 9.2–12.9)
POC MOLECULAR INFLUENZA A AGN: NEGATIVE
POC MOLECULAR INFLUENZA B AGN: NEGATIVE
POTASSIUM SERPL-SCNC: 3.4 MMOL/L (ref 3.5–5.1)
PROT SERPL-MCNC: 7.7 G/DL (ref 6–8.4)
RBC # BLD AUTO: 4.28 M/UL (ref 4–5.4)
SARS-COV-2 RDRP RESP QL NAA+PROBE: POSITIVE
SODIUM SERPL-SCNC: 135 MMOL/L (ref 136–145)
WBC # BLD AUTO: 7.65 K/UL (ref 3.9–12.7)

## 2022-12-15 PROCEDURE — 99284 EMERGENCY DEPT VISIT MOD MDM: CPT | Mod: 25

## 2022-12-15 PROCEDURE — 87502 INFLUENZA DNA AMP PROBE: CPT

## 2022-12-15 PROCEDURE — 83690 ASSAY OF LIPASE: CPT

## 2022-12-15 PROCEDURE — 63600175 PHARM REV CODE 636 W HCPCS

## 2022-12-15 PROCEDURE — 96361 HYDRATE IV INFUSION ADD-ON: CPT

## 2022-12-15 PROCEDURE — 96374 THER/PROPH/DIAG INJ IV PUSH: CPT

## 2022-12-15 PROCEDURE — 25000003 PHARM REV CODE 250

## 2022-12-15 PROCEDURE — 87635 SARS-COV-2 COVID-19 AMP PRB: CPT

## 2022-12-15 PROCEDURE — 36415 COLL VENOUS BLD VENIPUNCTURE: CPT

## 2022-12-15 PROCEDURE — 85025 COMPLETE CBC W/AUTO DIFF WBC: CPT

## 2022-12-15 PROCEDURE — 80053 COMPREHEN METABOLIC PANEL: CPT

## 2022-12-15 RX ORDER — ONDANSETRON 2 MG/ML
4 INJECTION INTRAMUSCULAR; INTRAVENOUS
Status: COMPLETED | OUTPATIENT
Start: 2022-12-15 | End: 2022-12-15

## 2022-12-15 RX ORDER — ONDANSETRON 4 MG/1
4 TABLET, FILM COATED ORAL EVERY 6 HOURS
Qty: 12 TABLET | Refills: 0 | Status: SHIPPED | OUTPATIENT
Start: 2022-12-15 | End: 2024-03-18

## 2022-12-15 RX ADMIN — ONDANSETRON HYDROCHLORIDE 4 MG: 2 SOLUTION INTRAMUSCULAR; INTRAVENOUS at 01:12

## 2022-12-15 RX ADMIN — SODIUM CHLORIDE 1000 ML: 9 INJECTION, SOLUTION INTRAVENOUS at 01:12

## 2022-12-15 NOTE — ED PROVIDER NOTES
Encounter Date: 12/15/2022       History     Chief Complaint   Patient presents with    Vomiting     N/V/D, onset this morning. Pt also reports low BP PTA.     77-year-old female to ED with complaints of nausea, vomiting, diarrhea since this morning.  She reports 2 episodes of vomiting prior to arrival.  One episode of diarrhea upon arrival to ED. she denies any chest pain, shortness a breath, abdominal pain.  She denies any urinary symptoms.  She denies any sick contacts.  She denies any fever.  Reports eating grits this morning and a chicken nugget last night.     The history is provided by the patient.   Review of patient's allergies indicates:   Allergen Reactions    Penicillins Rash    Sulfa (sulfonamide antibiotics) Rash     Past Medical History:   Diagnosis Date    Arthritis     Asthma     Cataract     BILATERAL    Diabetes mellitus, type 2     H/O: Bell's palsy     Hypertension     Wears dentures     FULL     Past Surgical History:   Procedure Laterality Date    HYSTERECTOMY      OOPHORECTOMY      right knee surgery       Family History   Problem Relation Age of Onset    Brain Hemorrhage Mother 33    No Known Problems Father     No Known Problems Sister     Ovarian cancer Daughter     No Known Problems Maternal Aunt     No Known Problems Maternal Uncle     No Known Problems Paternal Aunt     No Known Problems Paternal Uncle     No Known Problems Maternal Grandmother     No Known Problems Maternal Grandfather     No Known Problems Paternal Grandmother     No Known Problems Paternal Grandfather     Heart disease Brother     Heart disease Brother     Heart disease Brother     Breast cancer Neg Hx     BRCA 1/2 Neg Hx      Social History     Tobacco Use    Smoking status: Never    Smokeless tobacco: Never   Substance Use Topics    Alcohol use: No    Drug use: No     Review of Systems   Constitutional:  Positive for fatigue. Negative for chills and fever.   HENT:  Negative for congestion and sore throat.    Eyes:  Negative.    Respiratory:  Negative for cough and shortness of breath.    Cardiovascular:  Negative for chest pain and palpitations.   Gastrointestinal:  Positive for diarrhea, nausea and vomiting. Negative for abdominal pain.   Endocrine: Negative.    Genitourinary:  Negative for difficulty urinating.   Musculoskeletal:  Negative for myalgias.   Skin:  Negative for rash and wound.   Allergic/Immunologic: Negative.    Neurological:  Positive for weakness. Negative for headaches.   Hematological: Negative.    Psychiatric/Behavioral: Negative.       Physical Exam     Initial Vitals   BP Pulse Resp Temp SpO2   12/15/22 1223 12/15/22 1223 12/15/22 1223 12/15/22 1221 12/15/22 1223   139/72 (!) 47 18 97.5 °F (36.4 °C) 99 %      MAP       --                Physical Exam    Nursing note and vitals reviewed.  Constitutional: She appears well-developed and well-nourished.   HENT:   Head: Atraumatic.   Eyes: EOM are normal.   Neck: Neck supple.   Normal range of motion.  Pulmonary/Chest: Breath sounds normal. No respiratory distress. She has no wheezes.   Abdominal: Abdomen is soft. She exhibits no distension. There is no abdominal tenderness.   Musculoskeletal:         General: Normal range of motion.      Cervical back: Normal range of motion and neck supple.     Neurological: She is alert and oriented to person, place, and time.   Skin: Skin is warm and dry.   Psychiatric: She has a normal mood and affect. Thought content normal.       ED Course   Procedures  Labs Reviewed   CBC W/ AUTO DIFFERENTIAL - Abnormal; Notable for the following components:       Result Value    RDW 14.6 (*)     All other components within normal limits   COMPREHENSIVE METABOLIC PANEL - Abnormal; Notable for the following components:    Sodium 135 (*)     Potassium 3.4 (*)     CO2 30 (*)     Glucose 133 (*)     Creatinine 1.5 (*)     Anion Gap 6 (*)     eGFR 35.9 (*)     All other components within normal limits   SARS-COV-2 RDRP GENE - Abnormal;  Notable for the following components:    POC Rapid COVID Positive (*)     All other components within normal limits    Narrative:     This test utilizes isothermal nucleic acid amplification technology to detect the SARS-CoV-2 RdRp nucleic acid segment. The analytical sensitivity (limit of detection) is 500 copies/swab.     A POSITIVE result is indicative of the presence of SARS-CoV-2 RNA; clinical correlation with patient history and other diagnostic information is necessary to determine patient infection status.    A NEGATIVE result means that SARS-CoV-2 nucleic acids are not present above the limit of detection. A NEGATIVE result should be treated as presumptive. It does not rule out the possibility of COVID-19 and should not be the sole basis for treatment decisions. If COVID-19 is strongly suspected based on clinical and exposure history, re-testing using an alternate molecular assay should be considered.     This test is only for use under the Food and Drug Administration s Emergency Use Authorization (EUA).     Commercial kits are provided by [employer]. Performance characteristics of the EUA have been independently verified by [facility] Department of Pathology and Laboratory Medicine.   _________________________________________________________________   The authorized Fact Sheet for Healthcare Providers and the authorized Fact Sheet for Patients of the ID NOW COVID-19 are available on the FDA website:    https://www.fda.gov/media/210211/download      https://www.fda.gov/media/333188/download      LIPASE   POCT INFLUENZA A/B MOLECULAR          Imaging Results    None          Medications   sodium chloride 0.9% bolus 1,000 mL 1,000 mL (0 mLs Intravenous Stopped 12/15/22 1430)   ondansetron injection 4 mg (4 mg Intravenous Given 12/15/22 1305)     Medical Decision Making:   Clinical Tests:   Lab Tests: Ordered and Reviewed  ED Management:  77-year-old female with a history of hypertension  and diabetes presents to ED for above complaints.  She reports sudden onset of nausea vomiting and diarrhea this morning.  She denied any sick contacts.  She denied any fever.  She did test positive for COVID in ED today.  For no signs of respiratory distress noted.  Her lungs are clear in all fields.  She did not have any abdominal pain or abdominal tenderness.  There is no anemia or liver dysfunction noted.  She did have a slight decline in her sodium and potassium levels.  She showed signs of mild dehydration which I did hydrate her with 1 L of normal saline.  She also received 4 mg of Zofran while in ED.  She reported good improvement in symptoms.  She was instructed to follow-up with primary care if her symptoms do not improve.  I offered her Paxlovid and she should discuss with her primary care provider if that was an appropriate treatment.                        Clinical Impression:   Final diagnoses:  [U07.1] COVID (Primary)        ED Disposition Condition    Discharge Stable          ED Prescriptions       Medication Sig Dispense Start Date End Date Auth. Provider    ondansetron (ZOFRAN) 4 MG tablet Take 1 tablet (4 mg total) by mouth every 6 (six) hours. 12 tablet 12/15/2022 -- Peng Johnston NP          Follow-up Information       Follow up With Specialties Details Why Contact Info    Muna Boswell MD Family Medicine In 2 days  [address] [facility] [address]  [phone]               Peng Johnston NP  12/15/22 7543

## 2022-12-15 NOTE — DISCHARGE INSTRUCTIONS
You tested positive for COVID today.  I am sending you home with a prescription for Zofran for nausea, take 1 every 6 hours as needed for nausea/vomiting.  Drink plenty of fluids.  Take Tylenol as needed and directed for any fever or pain.  Slowly advance your diet starting with clear liquids.  Follow up with your primary care provider if your symptoms do not improve.  Contact their office to see if they recommend you taking the COVID medication called Paxlovid

## 2023-03-07 ENCOUNTER — HOSPITAL ENCOUNTER (EMERGENCY)
Facility: HOSPITAL | Age: 78
Discharge: HOME | End: 2023-03-07
Attending: EMERGENCY MEDICINE
Payer: MEDICARE

## 2023-03-07 VITALS
DIASTOLIC BLOOD PRESSURE: 78 MMHG | TEMPERATURE: 98 F | BODY MASS INDEX: 30.22 KG/M2 | RESPIRATION RATE: 17 BRPM | OXYGEN SATURATION: 98 % | HEART RATE: 40 BPM | SYSTOLIC BLOOD PRESSURE: 146 MMHG | WEIGHT: 188 LBS | HEIGHT: 66 IN

## 2023-03-07 DIAGNOSIS — R53.1 WEAKNESS: ICD-10-CM

## 2023-03-07 DIAGNOSIS — E87.6 HYPOKALEMIA: Primary | ICD-10-CM

## 2023-03-07 LAB
ALBUMIN SERPL BCP-MCNC: 3.4 G/DL (ref 3.5–5.2)
ALP SERPL-CCNC: 62 U/L (ref 55–135)
ALT SERPL W/O P-5'-P-CCNC: 14 U/L (ref 10–44)
ANION GAP SERPL CALC-SCNC: 5 MMOL/L (ref 8–16)
AST SERPL-CCNC: 10 U/L (ref 10–40)
BASOPHILS # BLD AUTO: 0.05 K/UL (ref 0–0.2)
BASOPHILS NFR BLD: 0.8 % (ref 0–1.9)
BILIRUB SERPL-MCNC: 0.6 MG/DL (ref 0.1–1)
BILIRUB UR QL STRIP: NEGATIVE
BUN SERPL-MCNC: 18 MG/DL (ref 8–23)
CALCIUM SERPL-MCNC: 9 MG/DL (ref 8.7–10.5)
CHLORIDE SERPL-SCNC: 104 MMOL/L (ref 95–110)
CLARITY UR: CLEAR
CO2 SERPL-SCNC: 30 MMOL/L (ref 23–29)
COLOR UR: YELLOW
CREAT SERPL-MCNC: 1.3 MG/DL (ref 0.5–1.4)
CTP QC/QA: YES
DIFFERENTIAL METHOD: ABNORMAL
EOSINOPHIL # BLD AUTO: 0.2 K/UL (ref 0–0.5)
EOSINOPHIL NFR BLD: 3.6 % (ref 0–8)
ERYTHROCYTE [DISTWIDTH] IN BLOOD BY AUTOMATED COUNT: 15.2 % (ref 11.5–14.5)
EST. GFR  (NO RACE VARIABLE): 42.4 ML/MIN/1.73 M^2
GLUCOSE SERPL-MCNC: 96 MG/DL (ref 70–110)
GLUCOSE UR QL STRIP: NEGATIVE
HCT VFR BLD AUTO: 35.2 % (ref 37–48.5)
HGB BLD-MCNC: 11.7 G/DL (ref 12–16)
HGB UR QL STRIP: NEGATIVE
IMM GRANULOCYTES # BLD AUTO: 0.01 K/UL (ref 0–0.04)
IMM GRANULOCYTES NFR BLD AUTO: 0.2 % (ref 0–0.5)
KETONES UR QL STRIP: NEGATIVE
LEUKOCYTE ESTERASE UR QL STRIP: NEGATIVE
LYMPHOCYTES # BLD AUTO: 1.3 K/UL (ref 1–4.8)
LYMPHOCYTES NFR BLD: 21 % (ref 18–48)
MAGNESIUM SERPL-MCNC: 2.1 MG/DL (ref 1.6–2.6)
MCH RBC QN AUTO: 30.7 PG (ref 27–31)
MCHC RBC AUTO-ENTMCNC: 33.2 G/DL (ref 32–36)
MCV RBC AUTO: 92 FL (ref 82–98)
MONOCYTES # BLD AUTO: 0.4 K/UL (ref 0.3–1)
MONOCYTES NFR BLD: 6.9 % (ref 4–15)
NEUTROPHILS # BLD AUTO: 4.2 K/UL (ref 1.8–7.7)
NEUTROPHILS NFR BLD: 67.5 % (ref 38–73)
NITRITE UR QL STRIP: NEGATIVE
NRBC BLD-RTO: 0 /100 WBC
NT-PROBNP SERPL-MCNC: 1244 PG/ML (ref 5–1800)
PH UR STRIP: 7 [PH] (ref 5–8)
PLATELET # BLD AUTO: 205 K/UL (ref 150–450)
PMV BLD AUTO: 9.8 FL (ref 9.2–12.9)
POTASSIUM SERPL-SCNC: 3.2 MMOL/L (ref 3.5–5.1)
PROT SERPL-MCNC: 6.8 G/DL (ref 6–8.4)
PROT UR QL STRIP: NEGATIVE
RBC # BLD AUTO: 3.81 M/UL (ref 4–5.4)
SARS-COV-2 RDRP RESP QL NAA+PROBE: NEGATIVE
SODIUM SERPL-SCNC: 139 MMOL/L (ref 136–145)
SP GR UR STRIP: 1.01 (ref 1–1.03)
TROPONIN I SERPL DL<=0.01 NG/ML-MCNC: 15.5 PG/ML (ref 0–60)
TROPONIN I SERPL DL<=0.01 NG/ML-MCNC: 16.3 PG/ML (ref 0–60)
URN SPEC COLLECT METH UR: NORMAL
UROBILINOGEN UR STRIP-ACNC: 1 EU/DL
WBC # BLD AUTO: 6.19 K/UL (ref 3.9–12.7)

## 2023-03-07 PROCEDURE — 99285 EMERGENCY DEPT VISIT HI MDM: CPT | Mod: 25

## 2023-03-07 PROCEDURE — 83880 ASSAY OF NATRIURETIC PEPTIDE: CPT | Performed by: EMERGENCY MEDICINE

## 2023-03-07 PROCEDURE — 80053 COMPREHEN METABOLIC PANEL: CPT | Performed by: EMERGENCY MEDICINE

## 2023-03-07 PROCEDURE — 36415 COLL VENOUS BLD VENIPUNCTURE: CPT | Performed by: EMERGENCY MEDICINE

## 2023-03-07 PROCEDURE — 85025 COMPLETE CBC W/AUTO DIFF WBC: CPT | Performed by: EMERGENCY MEDICINE

## 2023-03-07 PROCEDURE — 84484 ASSAY OF TROPONIN QUANT: CPT | Performed by: EMERGENCY MEDICINE

## 2023-03-07 PROCEDURE — 81003 URINALYSIS AUTO W/O SCOPE: CPT | Performed by: EMERGENCY MEDICINE

## 2023-03-07 PROCEDURE — 25000003 PHARM REV CODE 250: Performed by: EMERGENCY MEDICINE

## 2023-03-07 PROCEDURE — 83735 ASSAY OF MAGNESIUM: CPT | Performed by: EMERGENCY MEDICINE

## 2023-03-07 RX ORDER — ISOSORBIDE MONONITRATE 30 MG/1
30 TABLET, EXTENDED RELEASE ORAL
Status: ON HOLD | COMMUNITY
Start: 2023-01-12 | End: 2024-03-11 | Stop reason: HOSPADM

## 2023-03-07 RX ORDER — METOPROLOL SUCCINATE 50 MG/1
50 TABLET, EXTENDED RELEASE ORAL
Status: ON HOLD | COMMUNITY
Start: 2023-01-12 | End: 2024-03-11 | Stop reason: HOSPADM

## 2023-03-07 RX ORDER — APIXABAN 5 MG/1
5 TABLET, FILM COATED ORAL 2 TIMES DAILY
COMMUNITY
Start: 2023-01-12 | End: 2024-03-18 | Stop reason: SDUPTHER

## 2023-03-07 RX ORDER — FUROSEMIDE 20 MG/1
20 TABLET ORAL
Status: ON HOLD | COMMUNITY
Start: 2023-01-12 | End: 2024-03-11 | Stop reason: HOSPADM

## 2023-03-07 RX ADMIN — POTASSIUM BICARBONATE 40 MEQ: 391 TABLET, EFFERVESCENT ORAL at 09:03

## 2023-03-07 NOTE — ED PROVIDER NOTES
Encounter Date: 3/7/2023       History     Chief Complaint   Patient presents with    Shortness of Breath     Pt began with SOB yesterday. Denies other symptoms. Pt states it worsens with movement including bending. Denies recent injury, previous fall 3 weeks ago.      77 yo female with history as below here via POV with complaint of generalized weakness and fatigue. No cough. Feels winded with exertion. No CP. No fever/chills. No known sick contacts. Worse with exertion and alleviated at rest.    Review of patient's allergies indicates:   Allergen Reactions    Penicillins Rash    Sulfa (sulfonamide antibiotics) Rash     Past Medical History:   Diagnosis Date    Arthritis     Asthma     Cataract     BILATERAL    Diabetes mellitus, type 2     H/O: Bell's palsy     Hypertension     Wears dentures     FULL     Past Surgical History:   Procedure Laterality Date    HYSTERECTOMY      OOPHORECTOMY      right knee surgery       Family History   Problem Relation Age of Onset    Brain Hemorrhage Mother 33    No Known Problems Father     No Known Problems Sister     Ovarian cancer Daughter     No Known Problems Maternal Aunt     No Known Problems Maternal Uncle     No Known Problems Paternal Aunt     No Known Problems Paternal Uncle     No Known Problems Maternal Grandmother     No Known Problems Maternal Grandfather     No Known Problems Paternal Grandmother     No Known Problems Paternal Grandfather     Heart disease Brother     Heart disease Brother     Heart disease Brother     Breast cancer Neg Hx     BRCA 1/2 Neg Hx      Social History     Tobacco Use    Smoking status: Never    Smokeless tobacco: Never   Substance Use Topics    Alcohol use: No    Drug use: No     Review of Systems   Constitutional:  Positive for fatigue.   Respiratory: Negative.     Cardiovascular: Negative.    Gastrointestinal: Negative.    All other systems reviewed and are negative.    Physical Exam     Initial Vitals   BP Pulse Resp Temp SpO2    03/07/23 0824 03/07/23 0824 03/07/23 0824 03/07/23 0826 03/07/23 0824   (!) 183/81 (!) 50 20 97.8 °F (36.6 °C) 97 %      MAP       --                Physical Exam    Nursing note and vitals reviewed.  Constitutional: She appears well-developed and well-nourished. She is not diaphoretic. No distress.   HENT:   Head: Normocephalic and atraumatic.   Eyes: EOM are normal. Pupils are equal, round, and reactive to light.   Neck: Neck supple.   Normal range of motion.  Cardiovascular:  Normal rate, regular rhythm and intact distal pulses.           Pulmonary/Chest: Breath sounds normal. No respiratory distress. She has no wheezes. She has no rales.   Abdominal: Abdomen is soft. Bowel sounds are normal. She exhibits no distension. There is no abdominal tenderness. There is no rebound.   Musculoskeletal:         General: No tenderness or edema. Normal range of motion.      Cervical back: Normal range of motion and neck supple.     Neurological: She is alert and oriented to person, place, and time. She has normal strength and normal reflexes. GCS score is 15. GCS eye subscore is 4. GCS verbal subscore is 5. GCS motor subscore is 6.   Skin: Skin is warm and dry. Capillary refill takes less than 2 seconds.   Psychiatric: She has a normal mood and affect.       ED Course   Procedures  Labs Reviewed   CBC W/ AUTO DIFFERENTIAL - Abnormal; Notable for the following components:       Result Value    RBC 3.81 (*)     Hemoglobin 11.7 (*)     Hematocrit 35.2 (*)     RDW 15.2 (*)     All other components within normal limits   COMPREHENSIVE METABOLIC PANEL - Abnormal; Notable for the following components:    Potassium 3.2 (*)     CO2 30 (*)     Albumin 3.4 (*)     Anion Gap 5 (*)     eGFR 42.4 (*)     All other components within normal limits   MAGNESIUM   URINALYSIS, REFLEX TO URINE CULTURE    Narrative:     Preferred Collection Type->Urine, Clean Catch  Specimen Source->Urine   NT-PRO NATRIURETIC PEPTIDE   TROPONIN I HIGH  SENSITIVITY   TROPONIN I HIGH SENSITIVITY   SARS-COV-2 RDRP GENE     EKG Readings: (Independently Interpreted)   Initial Reading: No STEMI. Rhythm: Normal Sinus Rhythm. Ectopy: No Ectopy. Clinical Impression: Normal Sinus Rhythm     Imaging Results              X-Ray Chest AP Portable (Final result)  Result time 03/07/23 13:26:42      Final result by Renée Head MD (03/07/23 13:26:42)                   Impression:      Moderately enlarged cardiac silhouette suggests cardiomegaly and/or pericardial effusion      Electronically signed by: Renée Head MD  Date:    03/07/2023  Time:    13:26               Narrative:    EXAMINATION:  XR CHEST AP PORTABLE    CLINICAL HISTORY:  Weakness    FINDINGS:  Moderately enlarged cardiac silhouette.  No vascular congestion, acute infiltrate or pleural fluid.                                    X-Rays:   Independently Interpreted Readings:   Chest X-Ray: No acute abnormalities.   Medications   potassium bicarbonate disintegrating tablet 40 mEq (40 mEq Oral Given 3/7/23 0951)     Medical Decision Making:   Clinical Tests:   Lab Tests: Reviewed and Ordered  Radiological Study: Reviewed and Ordered  Medical Tests: Reviewed and Ordered                        Clinical Impression:   Final diagnoses:  [R53.1] Weakness  [E87.6] Hypokalemia (Primary)        ED Disposition Condition    Discharge Stable          ED Prescriptions    None       Follow-up Information       Follow up With Specialties Details Why Contact Info    Celestino Real MD Cardiology Schedule an appointment as soon as possible for a visit   [address] [facility] [address]  [facility] [address]  [phone]               Trent Rai MD  03/07/23 2681

## 2023-06-26 ENCOUNTER — HOSPITAL ENCOUNTER (OUTPATIENT)
Dept: RADIOLOGY | Facility: HOSPITAL | Age: 78
Discharge: HOME | End: 2023-06-26
Attending: NURSE PRACTITIONER
Payer: MEDICARE

## 2023-06-26 VITALS — HEIGHT: 66 IN | WEIGHT: 188 LBS | BODY MASS INDEX: 30.22 KG/M2

## 2023-06-26 DIAGNOSIS — Z12.31 SCREENING MAMMOGRAM, ENCOUNTER FOR: ICD-10-CM

## 2023-06-26 PROCEDURE — 77067 SCR MAMMO BI INCL CAD: CPT | Mod: TC

## 2023-10-20 ENCOUNTER — LAB VISIT (OUTPATIENT)
Dept: LAB | Facility: HOSPITAL | Age: 78
End: 2023-10-20
Attending: INTERNAL MEDICINE
Payer: MEDICARE

## 2023-10-20 DIAGNOSIS — I25.10 CORONARY ATHEROSCLEROSIS OF NATIVE CORONARY ARTERY: ICD-10-CM

## 2023-10-20 DIAGNOSIS — R94.31 NONSPECIFIC ABNORMAL ELECTROCARDIOGRAM (ECG) (EKG): Primary | ICD-10-CM

## 2023-10-20 LAB
ALBUMIN SERPL BCP-MCNC: 3.5 G/DL (ref 3.5–5.2)
ALP SERPL-CCNC: 107 U/L (ref 55–135)
ALT SERPL W/O P-5'-P-CCNC: 17 U/L (ref 10–44)
ANION GAP SERPL CALC-SCNC: 3 MMOL/L (ref 3–11)
AST SERPL-CCNC: 10 U/L (ref 10–40)
BASOPHILS # BLD AUTO: 0.06 K/UL (ref 0–0.2)
BASOPHILS NFR BLD: 0.9 % (ref 0–1.9)
BILIRUB SERPL-MCNC: 0.6 MG/DL (ref 0.1–1)
BUN SERPL-MCNC: 16 MG/DL (ref 8–23)
CALCIUM SERPL-MCNC: 9.1 MG/DL (ref 8.7–10.5)
CHLORIDE SERPL-SCNC: 110 MMOL/L (ref 95–110)
CHOLEST SERPL-MCNC: 145 MG/DL (ref 120–199)
CHOLEST/HDLC SERPL: 2 {RATIO} (ref 2–5)
CO2 SERPL-SCNC: 30 MMOL/L (ref 23–29)
CREAT SERPL-MCNC: 1.4 MG/DL (ref 0.5–1.4)
CRP SERPL-MCNC: 0.38 MG/DL (ref 0–0.75)
DIFFERENTIAL METHOD: ABNORMAL
EOSINOPHIL # BLD AUTO: 0.4 K/UL (ref 0–0.5)
EOSINOPHIL NFR BLD: 5.3 % (ref 0–8)
ERYTHROCYTE [DISTWIDTH] IN BLOOD BY AUTOMATED COUNT: 14.6 % (ref 11.5–14.5)
EST. GFR  (NO RACE VARIABLE): 38.7 ML/MIN/1.73 M^2
ESTIMATED AVG GLUCOSE: 126 MG/DL (ref 68–131)
GLUCOSE SERPL-MCNC: 89 MG/DL (ref 70–110)
HBA1C MFR BLD: 6 % (ref 4–5.6)
HCT VFR BLD AUTO: 39.3 % (ref 37–48.5)
HDLC SERPL-MCNC: 74 MG/DL (ref 40–75)
HDLC SERPL: 51 % (ref 20–50)
HGB BLD-MCNC: 13 G/DL (ref 12–16)
IMM GRANULOCYTES # BLD AUTO: 0.01 K/UL (ref 0–0.04)
IMM GRANULOCYTES NFR BLD AUTO: 0.1 % (ref 0–0.5)
LDLC SERPL CALC-MCNC: 60.4 MG/DL (ref 63–159)
LYMPHOCYTES # BLD AUTO: 1.6 K/UL (ref 1–4.8)
LYMPHOCYTES NFR BLD: 23.1 % (ref 18–48)
MCH RBC QN AUTO: 31.1 PG (ref 27–31)
MCHC RBC AUTO-ENTMCNC: 33.1 G/DL (ref 32–36)
MCV RBC AUTO: 94 FL (ref 82–98)
MONOCYTES # BLD AUTO: 0.6 K/UL (ref 0.3–1)
MONOCYTES NFR BLD: 9 % (ref 4–15)
NEUTROPHILS # BLD AUTO: 4.3 K/UL (ref 1.8–7.7)
NEUTROPHILS NFR BLD: 61.6 % (ref 38–73)
NONHDLC SERPL-MCNC: 71 MG/DL
NRBC BLD-RTO: 0 /100 WBC
PLATELET # BLD AUTO: 198 K/UL (ref 150–450)
PMV BLD AUTO: 10 FL (ref 9.2–12.9)
POTASSIUM SERPL-SCNC: 3.4 MMOL/L (ref 3.5–5.1)
PROT SERPL-MCNC: 7.7 G/DL (ref 6–8.4)
RBC # BLD AUTO: 4.18 M/UL (ref 4–5.4)
SODIUM SERPL-SCNC: 143 MMOL/L (ref 136–145)
TRIGL SERPL-MCNC: 53 MG/DL (ref 30–150)
WBC # BLD AUTO: 6.97 K/UL (ref 3.9–12.7)

## 2023-10-20 PROCEDURE — 83036 HEMOGLOBIN GLYCOSYLATED A1C: CPT | Performed by: INTERNAL MEDICINE

## 2023-10-20 PROCEDURE — 85025 COMPLETE CBC W/AUTO DIFF WBC: CPT | Performed by: INTERNAL MEDICINE

## 2023-10-20 PROCEDURE — 86140 C-REACTIVE PROTEIN: CPT | Performed by: INTERNAL MEDICINE

## 2023-10-20 PROCEDURE — 80053 COMPREHEN METABOLIC PANEL: CPT | Performed by: INTERNAL MEDICINE

## 2023-10-20 PROCEDURE — 80061 LIPID PANEL: CPT | Performed by: INTERNAL MEDICINE

## 2023-10-20 PROCEDURE — 36415 COLL VENOUS BLD VENIPUNCTURE: CPT | Performed by: INTERNAL MEDICINE

## 2024-02-23 ENCOUNTER — HOSPITAL ENCOUNTER (EMERGENCY)
Facility: HOSPITAL | Age: 79
Discharge: HOME | End: 2024-02-23
Attending: EMERGENCY MEDICINE
Payer: MEDICARE

## 2024-02-23 VITALS
BODY MASS INDEX: 30.22 KG/M2 | SYSTOLIC BLOOD PRESSURE: 132 MMHG | OXYGEN SATURATION: 95 % | RESPIRATION RATE: 25 BRPM | DIASTOLIC BLOOD PRESSURE: 82 MMHG | HEIGHT: 66 IN | HEART RATE: 85 BPM | TEMPERATURE: 98 F | WEIGHT: 188 LBS

## 2024-02-23 DIAGNOSIS — R06.02 SHORTNESS OF BREATH: ICD-10-CM

## 2024-02-23 DIAGNOSIS — R60.0 LOWER LEG EDEMA: Primary | ICD-10-CM

## 2024-02-23 LAB
ALBUMIN SERPL BCP-MCNC: 3.6 G/DL (ref 3.5–5.2)
ALP SERPL-CCNC: 147 U/L (ref 55–135)
ALT SERPL W/O P-5'-P-CCNC: 12 U/L (ref 10–44)
ANION GAP SERPL CALC-SCNC: 4 MMOL/L (ref 3–11)
AST SERPL-CCNC: 13 U/L (ref 10–40)
BASOPHILS # BLD AUTO: 0.07 K/UL (ref 0–0.2)
BASOPHILS NFR BLD: 1.3 % (ref 0–1.9)
BILIRUB SERPL-MCNC: 1.1 MG/DL (ref 0.1–1)
BUN SERPL-MCNC: 10 MG/DL (ref 8–23)
CALCIUM SERPL-MCNC: 9.8 MG/DL (ref 8.7–10.5)
CHLORIDE SERPL-SCNC: 110 MMOL/L (ref 95–110)
CO2 SERPL-SCNC: 27 MMOL/L (ref 23–29)
CREAT SERPL-MCNC: 1.1 MG/DL (ref 0.5–1.4)
CTP QC/QA: YES
CTP QC/QA: YES
DIFFERENTIAL METHOD BLD: ABNORMAL
EOSINOPHIL # BLD AUTO: 0.4 K/UL (ref 0–0.5)
EOSINOPHIL NFR BLD: 7.2 % (ref 0–8)
ERYTHROCYTE [DISTWIDTH] IN BLOOD BY AUTOMATED COUNT: 17.8 % (ref 11.5–14.5)
EST. GFR  (NO RACE VARIABLE): 51.8 ML/MIN/1.73 M^2
GLUCOSE SERPL-MCNC: 96 MG/DL (ref 70–110)
HCT VFR BLD AUTO: 39.3 % (ref 37–48.5)
HGB BLD-MCNC: 13.3 G/DL (ref 12–16)
IMM GRANULOCYTES # BLD AUTO: 0.01 K/UL (ref 0–0.04)
IMM GRANULOCYTES NFR BLD AUTO: 0.2 % (ref 0–0.5)
LYMPHOCYTES # BLD AUTO: 1.4 K/UL (ref 1–4.8)
LYMPHOCYTES NFR BLD: 25.8 % (ref 18–48)
MCH RBC QN AUTO: 30.5 PG (ref 27–31)
MCHC RBC AUTO-ENTMCNC: 33.8 G/DL (ref 32–36)
MCV RBC AUTO: 90 FL (ref 82–98)
MONOCYTES # BLD AUTO: 0.5 K/UL (ref 0.3–1)
MONOCYTES NFR BLD: 9 % (ref 4–15)
NEUTROPHILS # BLD AUTO: 3.2 K/UL (ref 1.8–7.7)
NEUTROPHILS NFR BLD: 56.5 % (ref 38–73)
NRBC BLD-RTO: 0 /100 WBC
NT-PROBNP SERPL-MCNC: 1483 PG/ML (ref 5–1800)
OHS QRS DURATION: 78 MS
OHS QTC CALCULATION: 443 MS
PLATELET # BLD AUTO: 213 K/UL (ref 150–450)
PMV BLD AUTO: 10.2 FL (ref 9.2–12.9)
POC MOLECULAR INFLUENZA A AGN: NEGATIVE
POC MOLECULAR INFLUENZA B AGN: NEGATIVE
POTASSIUM SERPL-SCNC: 3.6 MMOL/L (ref 3.5–5.1)
PROT SERPL-MCNC: 8.1 G/DL (ref 6–8.4)
RBC # BLD AUTO: 4.36 M/UL (ref 4–5.4)
SARS-COV-2 RDRP RESP QL NAA+PROBE: NEGATIVE
SODIUM SERPL-SCNC: 141 MMOL/L (ref 136–145)
TROPONIN I SERPL DL<=0.01 NG/ML-MCNC: 12.5 PG/ML (ref 0–60)
WBC # BLD AUTO: 5.58 K/UL (ref 3.9–12.7)

## 2024-02-23 PROCEDURE — 80053 COMPREHEN METABOLIC PANEL: CPT | Performed by: EMERGENCY MEDICINE

## 2024-02-23 PROCEDURE — 93010 ELECTROCARDIOGRAM REPORT: CPT | Mod: ,,, | Performed by: INTERNAL MEDICINE

## 2024-02-23 PROCEDURE — 83880 ASSAY OF NATRIURETIC PEPTIDE: CPT | Performed by: EMERGENCY MEDICINE

## 2024-02-23 PROCEDURE — 36415 COLL VENOUS BLD VENIPUNCTURE: CPT | Performed by: EMERGENCY MEDICINE

## 2024-02-23 PROCEDURE — 99285 EMERGENCY DEPT VISIT HI MDM: CPT | Mod: 25

## 2024-02-23 PROCEDURE — 87635 SARS-COV-2 COVID-19 AMP PRB: CPT | Performed by: EMERGENCY MEDICINE

## 2024-02-23 PROCEDURE — 93005 ELECTROCARDIOGRAM TRACING: CPT

## 2024-02-23 PROCEDURE — 96374 THER/PROPH/DIAG INJ IV PUSH: CPT

## 2024-02-23 PROCEDURE — 87502 INFLUENZA DNA AMP PROBE: CPT

## 2024-02-23 PROCEDURE — 84484 ASSAY OF TROPONIN QUANT: CPT | Performed by: EMERGENCY MEDICINE

## 2024-02-23 PROCEDURE — 85025 COMPLETE CBC W/AUTO DIFF WBC: CPT | Performed by: EMERGENCY MEDICINE

## 2024-02-23 PROCEDURE — 63600175 PHARM REV CODE 636 W HCPCS: Performed by: EMERGENCY MEDICINE

## 2024-02-23 RX ORDER — FUROSEMIDE 20 MG/1
20 TABLET ORAL EVERY 8 HOURS
Qty: 30 TABLET | Refills: 0 | Status: ON HOLD | OUTPATIENT
Start: 2024-02-23 | End: 2024-03-11 | Stop reason: HOSPADM

## 2024-02-23 RX ORDER — FUROSEMIDE 10 MG/ML
80 INJECTION INTRAMUSCULAR; INTRAVENOUS
Status: COMPLETED | OUTPATIENT
Start: 2024-02-23 | End: 2024-02-23

## 2024-02-23 RX ADMIN — FUROSEMIDE 80 MG: 10 INJECTION, SOLUTION INTRAVENOUS at 11:02

## 2024-02-23 NOTE — ED PROVIDER NOTES
"Encounter Date: 2/23/2024       History     Chief Complaint   Patient presents with    Shortness of Breath     Pt stated that she has been experiencing worsening dyspnea with lower extremity swelling for the past week with non-productive cough.      78-year-old female with a history of "asthma", diabetes, Bell's palsy, atrial fibrillation on Eliquis presents to the emergency department with gradually worsening shortness of breath and lower extremity edema for the past week associated with dyspnea on exertion and orthopnea.  Oxygen saturations 95% on room air.  Denies any chest pain.  Alert and oriented x4, GCS is 15.  Her cardiologist is Dr. Real      Review of patient's allergies indicates:   Allergen Reactions    Penicillins Rash    Sulfa (sulfonamide antibiotics) Rash     Past Medical History:   Diagnosis Date    Arthritis     Asthma     Cataract     BILATERAL    Diabetes mellitus, type 2     H/O: Bell's palsy     Hypertension     Wears dentures     FULL     Past Surgical History:   Procedure Laterality Date    HYSTERECTOMY      OOPHORECTOMY      right knee surgery       Family History   Problem Relation Age of Onset    Brain Hemorrhage Mother 33    No Known Problems Father     No Known Problems Sister     Ovarian cancer Daughter     No Known Problems Maternal Aunt     No Known Problems Maternal Uncle     No Known Problems Paternal Aunt     No Known Problems Paternal Uncle     No Known Problems Maternal Grandmother     No Known Problems Maternal Grandfather     No Known Problems Paternal Grandmother     No Known Problems Paternal Grandfather     Heart disease Brother     Heart disease Brother     Heart disease Brother     Breast cancer Neg Hx     BRCA 1/2 Neg Hx      Social History     Tobacco Use    Smoking status: Never    Smokeless tobacco: Never   Substance Use Topics    Alcohol use: No    Drug use: No     Review of Systems   Constitutional:  Negative for fever.   HENT:  Negative for sore throat.  "   Respiratory:  Positive for shortness of breath.    Cardiovascular:  Positive for leg swelling. Negative for chest pain.   Gastrointestinal:  Negative for nausea.   Genitourinary:  Negative for dysuria.   Musculoskeletal:  Negative for back pain.   Skin:  Negative for rash.   Neurological:  Negative for weakness.   Hematological:  Does not bruise/bleed easily.   All other systems reviewed and are negative.      Physical Exam     Initial Vitals [02/23/24 1008]   BP Pulse Resp Temp SpO2   120/81 90 (!) 24 97.5 °F (36.4 °C) 95 %      MAP       --         Physical Exam    Nursing note and vitals reviewed.  Constitutional: She appears well-developed and well-nourished. She is not diaphoretic. No distress.   HENT:   Head: Normocephalic and atraumatic.   Mouth/Throat: Oropharynx is clear and moist.   Eyes: Conjunctivae and EOM are normal. Pupils are equal, round, and reactive to light.   Neck: Neck supple. No tracheal deviation present. No JVD present.   Normal range of motion.  Cardiovascular:  Normal rate, normal heart sounds and intact distal pulses.           No murmur heard.  Irregularly irregular   Pulmonary/Chest: Breath sounds normal. No stridor. No respiratory distress. She has no wheezes. She has no rhonchi. She has no rales. She exhibits no tenderness.   Abdominal: Abdomen is soft. Bowel sounds are normal. She exhibits no distension. There is no abdominal tenderness. There is no rebound.   Musculoskeletal:         General: Edema present. No tenderness. Normal range of motion.      Cervical back: Normal range of motion and neck supple.     Neurological: She is alert and oriented to person, place, and time. She has normal strength. GCS score is 15. GCS eye subscore is 4. GCS verbal subscore is 5. GCS motor subscore is 6.   Right-sided facial droop consistent with Bell's palsy   Skin: Skin is warm and dry. Capillary refill takes less than 2 seconds.         ED Course   Procedures  Labs Reviewed   CBC W/ AUTO  DIFFERENTIAL - Abnormal; Notable for the following components:       Result Value    RDW 17.8 (*)     All other components within normal limits   COMPREHENSIVE METABOLIC PANEL - Abnormal; Notable for the following components:    Total Bilirubin 1.1 (*)     Alkaline Phosphatase 147 (*)     eGFR 51.8 (*)     All other components within normal limits   NT-PRO NATRIURETIC PEPTIDE   TROPONIN I HIGH SENSITIVITY   SARS-COV-2 RDRP GENE   POCT INFLUENZA A/B MOLECULAR     EKG Readings: (Independently Interpreted)   Initial Reading: No STEMI. Rhythm: Atrial Fibrillation. Heart Rate: 70. Ectopy: PVCs. Conduction: Normal. ST Segments: Normal ST Segments. T Waves: Normal. Axis: Normal. Clinical Impression: Atrial Fibrillation with PVCs     ECG Results              EKG 12-lead (Final result)        Collection Time Result Time QRS Duration OHS QTC Calculation    02/23/24 10:22:53 02/23/24 11:09:27 78 443                     Final result by Interface, Lab In [facility] (02/23/24 11:09:34)                   Narrative:    Test Reason : R06.02,    Vent. Rate : 077 BPM     Atrial Rate : 085 BPM     P-R Int : 000 ms          QRS Dur : 078 ms      QT Int : 392 ms       P-R-T Axes : 000 061 -69 degrees     QTc Int : 443 ms    Atrial fibrillation with premature ventricular or aberrantly conducted  complexes  Low voltage QRS  Cannot rule out Anterior infarct ,age undetermined  Abnormal ECG  No previous ECGs available  Confirmed by Navjot PICHARDO MD ([id number]) on 2/23/2024 11:09:23 AM    Referred By: [email or web address]   SELF           Confirmed By:Navjot PICHARDO MD                                  Imaging Results              X-Ray Chest 1 View (In process)                      Medications   furosemide injection 80 mg (80 mg Intravenous Given 2/23/24 1157)     Medical Decision Making  Amount and/or Complexity of Data Reviewed  Labs: ordered.  Radiology: ordered.    Risk  Prescription drug management.               ED Course as of 02/23/24 1203   Fri Feb 23, 2024    1057 Chest x-ray consistent with cardiomegaly [SD]      ED Course User Index  [SD] Craig Winters MD               Medical Decision Making:   Differential Diagnosis:   Lower leg edema, pulmonary edema, congestive heart failure, URI, viral syndrome  ED Management:  Discussed case with Dr. Real, recommends IV Lasix here, increase her Lasix in the outpatient setting and he will schedule follow up for her next week.  Patient's oxygen saturation is 97% here on room air.  Does not appear to be struggling for air.  Chest x-ray is clear except for cardiomegaly.  She is stable for discharge and follow-up to Dr. Real             Clinical Impression:  Final diagnoses:  [R06.02] Shortness of breath  [R60.0] Lower leg edema (Primary)          ED Disposition Condition    Discharge Stable          ED Prescriptions       Medication Sig Dispense Start Date End Date Auth. Provider    furosemide (LASIX) 20 MG tablet Take 1 tablet (20 mg total) by mouth every 8 (eight) hours. 30 tablet 2/23/2024 2/22/2025 Craig Winters MD          Follow-up Information       Follow up With Specialties Details Why Contact Info Additional Information    Celestino Real MD Cardiology In 3 days  [address] [facility] [address]  [facility] [address]  [phone]       [place] - Emergency Department Emergency Medicine  As needed, If symptoms worsen [address] [facility] [id number] [phone] Floor 1             Craig Winters MD  02/23/24 1201

## 2024-03-07 ENCOUNTER — HOSPITAL ENCOUNTER (INPATIENT)
Facility: HOSPITAL | Age: 79
LOS: 3 days | Discharge: HOME HEALTH | DRG: 291 | End: 2024-03-11
Attending: STUDENT IN AN ORGANIZED HEALTH CARE EDUCATION/TRAINING PROGRAM | Admitting: INTERNAL MEDICINE
Payer: MEDICARE

## 2024-03-07 DIAGNOSIS — I48.91 ATRIAL FIBRILLATION, UNSPECIFIED TYPE: ICD-10-CM

## 2024-03-07 DIAGNOSIS — I50.20 HEART FAILURE WITH REDUCED EJECTION FRACTION: Primary | ICD-10-CM

## 2024-03-07 DIAGNOSIS — R06.02 SHORTNESS OF BREATH: ICD-10-CM

## 2024-03-07 DIAGNOSIS — R07.9 CHEST PAIN: ICD-10-CM

## 2024-03-07 DIAGNOSIS — I50.9 CONGESTIVE HEART FAILURE, UNSPECIFIED HF CHRONICITY, UNSPECIFIED HEART FAILURE TYPE: ICD-10-CM

## 2024-03-07 PROBLEM — E11.9 TYPE 2 DIABETES MELLITUS, WITHOUT LONG-TERM CURRENT USE OF INSULIN: Status: ACTIVE | Noted: 2024-03-07

## 2024-03-07 LAB
ALBUMIN SERPL BCP-MCNC: 3.6 G/DL (ref 3.5–5.2)
ALP SERPL-CCNC: 143 U/L (ref 55–135)
ALT SERPL W/O P-5'-P-CCNC: 13 U/L (ref 10–44)
ANION GAP SERPL CALC-SCNC: 3 MMOL/L (ref 3–11)
AST SERPL-CCNC: 15 U/L (ref 10–40)
BASOPHILS # BLD AUTO: 0.08 K/UL (ref 0–0.2)
BASOPHILS NFR BLD: 1.2 % (ref 0–1.9)
BILIRUB SERPL-MCNC: 1.1 MG/DL (ref 0.1–1)
BUN SERPL-MCNC: 13 MG/DL (ref 8–23)
CALCIUM SERPL-MCNC: 10.1 MG/DL (ref 8.7–10.5)
CHLORIDE SERPL-SCNC: 106 MMOL/L (ref 95–110)
CO2 SERPL-SCNC: 30 MMOL/L (ref 23–29)
CREAT SERPL-MCNC: 1.5 MG/DL (ref 0.5–1.4)
CTP QC/QA: YES
CTP QC/QA: YES
DIFFERENTIAL METHOD BLD: ABNORMAL
EOSINOPHIL # BLD AUTO: 0.3 K/UL (ref 0–0.5)
EOSINOPHIL NFR BLD: 4 % (ref 0–8)
ERYTHROCYTE [DISTWIDTH] IN BLOOD BY AUTOMATED COUNT: 17.2 % (ref 11.5–14.5)
EST. GFR  (NO RACE VARIABLE): 35.4 ML/MIN/1.73 M^2
ESTIMATED AVG GLUCOSE: 111 MG/DL (ref 68–131)
GLUCOSE SERPL-MCNC: 89 MG/DL (ref 70–110)
HBA1C MFR BLD: 5.5 % (ref 4–5.6)
HCT VFR BLD AUTO: 40.8 % (ref 37–48.5)
HGB BLD-MCNC: 14.1 G/DL (ref 12–16)
IMM GRANULOCYTES # BLD AUTO: 0.02 K/UL (ref 0–0.04)
IMM GRANULOCYTES NFR BLD AUTO: 0.3 % (ref 0–0.5)
LYMPHOCYTES # BLD AUTO: 1.3 K/UL (ref 1–4.8)
LYMPHOCYTES NFR BLD: 20 % (ref 18–48)
MCH RBC QN AUTO: 30.9 PG (ref 27–31)
MCHC RBC AUTO-ENTMCNC: 34.6 G/DL (ref 32–36)
MCV RBC AUTO: 89 FL (ref 82–98)
MONOCYTES # BLD AUTO: 0.6 K/UL (ref 0.3–1)
MONOCYTES NFR BLD: 8.8 % (ref 4–15)
NEUTROPHILS # BLD AUTO: 4.4 K/UL (ref 1.8–7.7)
NEUTROPHILS NFR BLD: 65.7 % (ref 38–73)
NRBC BLD-RTO: 0 /100 WBC
NT-PROBNP SERPL-MCNC: 2018 PG/ML (ref 5–1800)
PLATELET # BLD AUTO: 168 K/UL (ref 150–450)
PMV BLD AUTO: 10.8 FL (ref 9.2–12.9)
POC MOLECULAR INFLUENZA A AGN: NEGATIVE
POC MOLECULAR INFLUENZA B AGN: NEGATIVE
POCT GLUCOSE: 131 MG/DL (ref 70–110)
POTASSIUM SERPL-SCNC: 3.6 MMOL/L (ref 3.5–5.1)
PROT SERPL-MCNC: 7.8 G/DL (ref 6–8.4)
RBC # BLD AUTO: 4.57 M/UL (ref 4–5.4)
SARS-COV-2 RDRP RESP QL NAA+PROBE: NEGATIVE
SODIUM SERPL-SCNC: 139 MMOL/L (ref 136–145)
TROPONIN I SERPL DL<=0.01 NG/ML-MCNC: 13.4 PG/ML (ref 0–60)
WBC # BLD AUTO: 6.69 K/UL (ref 3.9–12.7)

## 2024-03-07 PROCEDURE — 99900035 HC TECH TIME PER 15 MIN (STAT)

## 2024-03-07 PROCEDURE — 93010 ELECTROCARDIOGRAM REPORT: CPT | Mod: ,,, | Performed by: INTERNAL MEDICINE

## 2024-03-07 PROCEDURE — 84484 ASSAY OF TROPONIN QUANT: CPT | Performed by: STUDENT IN AN ORGANIZED HEALTH CARE EDUCATION/TRAINING PROGRAM

## 2024-03-07 PROCEDURE — 36415 COLL VENOUS BLD VENIPUNCTURE: CPT | Performed by: STUDENT IN AN ORGANIZED HEALTH CARE EDUCATION/TRAINING PROGRAM

## 2024-03-07 PROCEDURE — 63600175 PHARM REV CODE 636 W HCPCS

## 2024-03-07 PROCEDURE — 99285 EMERGENCY DEPT VISIT HI MDM: CPT | Mod: 25

## 2024-03-07 PROCEDURE — 99900031 HC PATIENT EDUCATION (STAT)

## 2024-03-07 PROCEDURE — 85025 COMPLETE CBC W/AUTO DIFF WBC: CPT | Performed by: STUDENT IN AN ORGANIZED HEALTH CARE EDUCATION/TRAINING PROGRAM

## 2024-03-07 PROCEDURE — 36415 COLL VENOUS BLD VENIPUNCTURE: CPT | Mod: XB

## 2024-03-07 PROCEDURE — 96374 THER/PROPH/DIAG INJ IV PUSH: CPT

## 2024-03-07 PROCEDURE — 87635 SARS-COV-2 COVID-19 AMP PRB: CPT | Performed by: STUDENT IN AN ORGANIZED HEALTH CARE EDUCATION/TRAINING PROGRAM

## 2024-03-07 PROCEDURE — 83036 HEMOGLOBIN GLYCOSYLATED A1C: CPT

## 2024-03-07 PROCEDURE — G0378 HOSPITAL OBSERVATION PER HR: HCPCS

## 2024-03-07 PROCEDURE — 93005 ELECTROCARDIOGRAM TRACING: CPT

## 2024-03-07 PROCEDURE — 25000003 PHARM REV CODE 250: Performed by: STUDENT IN AN ORGANIZED HEALTH CARE EDUCATION/TRAINING PROGRAM

## 2024-03-07 PROCEDURE — 63600175 PHARM REV CODE 636 W HCPCS: Performed by: STUDENT IN AN ORGANIZED HEALTH CARE EDUCATION/TRAINING PROGRAM

## 2024-03-07 PROCEDURE — 25000003 PHARM REV CODE 250

## 2024-03-07 PROCEDURE — 83880 ASSAY OF NATRIURETIC PEPTIDE: CPT | Performed by: STUDENT IN AN ORGANIZED HEALTH CARE EDUCATION/TRAINING PROGRAM

## 2024-03-07 PROCEDURE — 27000221 HC OXYGEN, UP TO 24 HOURS

## 2024-03-07 PROCEDURE — 94761 N-INVAS EAR/PLS OXIMETRY MLT: CPT

## 2024-03-07 PROCEDURE — 80053 COMPREHEN METABOLIC PANEL: CPT | Performed by: STUDENT IN AN ORGANIZED HEALTH CARE EDUCATION/TRAINING PROGRAM

## 2024-03-07 RX ORDER — METOPROLOL SUCCINATE 50 MG/1
50 TABLET, EXTENDED RELEASE ORAL DAILY
Status: DISCONTINUED | OUTPATIENT
Start: 2024-03-08 | End: 2024-03-11 | Stop reason: HOSPADM

## 2024-03-07 RX ORDER — ASPIRIN 81 MG/1
81 TABLET ORAL DAILY
Status: DISCONTINUED | OUTPATIENT
Start: 2024-03-08 | End: 2024-03-08

## 2024-03-07 RX ORDER — ACETAMINOPHEN 325 MG/1
650 TABLET ORAL EVERY 8 HOURS PRN
Status: DISCONTINUED | OUTPATIENT
Start: 2024-03-07 | End: 2024-03-11 | Stop reason: HOSPADM

## 2024-03-07 RX ORDER — SPIRONOLACTONE 25 MG/1
25 TABLET ORAL DAILY
Status: DISCONTINUED | OUTPATIENT
Start: 2024-03-08 | End: 2024-03-11 | Stop reason: HOSPADM

## 2024-03-07 RX ORDER — ONDANSETRON HYDROCHLORIDE 2 MG/ML
4 INJECTION, SOLUTION INTRAVENOUS EVERY 8 HOURS PRN
Status: DISCONTINUED | OUTPATIENT
Start: 2024-03-07 | End: 2024-03-11 | Stop reason: HOSPADM

## 2024-03-07 RX ORDER — ATORVASTATIN CALCIUM 40 MG/1
40 TABLET, FILM COATED ORAL NIGHTLY
Status: DISCONTINUED | OUTPATIENT
Start: 2024-03-07 | End: 2024-03-11 | Stop reason: HOSPADM

## 2024-03-07 RX ORDER — POTASSIUM CHLORIDE 20 MEQ/1
20 TABLET, EXTENDED RELEASE ORAL DAILY
Status: DISCONTINUED | OUTPATIENT
Start: 2024-03-08 | End: 2024-03-08

## 2024-03-07 RX ORDER — GUAIFENESIN 100 MG/5ML
200 SOLUTION ORAL EVERY 4 HOURS PRN
Status: DISCONTINUED | OUTPATIENT
Start: 2024-03-07 | End: 2024-03-11 | Stop reason: HOSPADM

## 2024-03-07 RX ORDER — INSULIN ASPART 100 [IU]/ML
0-5 INJECTION, SOLUTION INTRAVENOUS; SUBCUTANEOUS
Status: DISCONTINUED | OUTPATIENT
Start: 2024-03-07 | End: 2024-03-11 | Stop reason: HOSPADM

## 2024-03-07 RX ORDER — GLUCAGON 1 MG
1 KIT INJECTION
Status: DISCONTINUED | OUTPATIENT
Start: 2024-03-07 | End: 2024-03-11 | Stop reason: HOSPADM

## 2024-03-07 RX ORDER — SODIUM CHLORIDE 0.9 % (FLUSH) 0.9 %
10 SYRINGE (ML) INJECTION
Status: DISCONTINUED | OUTPATIENT
Start: 2024-03-07 | End: 2024-03-11 | Stop reason: HOSPADM

## 2024-03-07 RX ORDER — ISOSORBIDE MONONITRATE 30 MG/1
30 TABLET, EXTENDED RELEASE ORAL DAILY
Status: DISCONTINUED | OUTPATIENT
Start: 2024-03-08 | End: 2024-03-11 | Stop reason: HOSPADM

## 2024-03-07 RX ORDER — FUROSEMIDE 10 MG/ML
40 INJECTION INTRAMUSCULAR; INTRAVENOUS
Status: COMPLETED | OUTPATIENT
Start: 2024-03-07 | End: 2024-03-07

## 2024-03-07 RX ORDER — IBUPROFEN 200 MG
16 TABLET ORAL
Status: DISCONTINUED | OUTPATIENT
Start: 2024-03-07 | End: 2024-03-11 | Stop reason: HOSPADM

## 2024-03-07 RX ORDER — IBUPROFEN 200 MG
24 TABLET ORAL
Status: DISCONTINUED | OUTPATIENT
Start: 2024-03-07 | End: 2024-03-11 | Stop reason: HOSPADM

## 2024-03-07 RX ORDER — DAPAGLIFLOZIN 10 MG/1
10 TABLET, FILM COATED ORAL DAILY
Status: DISCONTINUED | OUTPATIENT
Start: 2024-03-08 | End: 2024-03-11 | Stop reason: HOSPADM

## 2024-03-07 RX ORDER — TALC
6 POWDER (GRAM) TOPICAL NIGHTLY PRN
Status: DISCONTINUED | OUTPATIENT
Start: 2024-03-07 | End: 2024-03-11 | Stop reason: HOSPADM

## 2024-03-07 RX ADMIN — ATORVASTATIN CALCIUM 40 MG: 40 TABLET, FILM COATED ORAL at 08:03

## 2024-03-07 RX ADMIN — APIXABAN 5 MG: 5 TABLET, FILM COATED ORAL at 08:03

## 2024-03-07 RX ADMIN — FUROSEMIDE 5 MG/HR: 10 INJECTION, SOLUTION INTRAMUSCULAR; INTRAVENOUS at 06:03

## 2024-03-07 RX ADMIN — GUAIFENESIN 200 MG: 200 SOLUTION ORAL at 10:03

## 2024-03-07 RX ADMIN — Medication 6 MG: at 08:03

## 2024-03-07 RX ADMIN — FUROSEMIDE 40 MG: 10 INJECTION, SOLUTION INTRAVENOUS at 02:03

## 2024-03-07 NOTE — SUBJECTIVE & OBJECTIVE
Past Medical History:   Diagnosis Date    Arthritis     Asthma     Cataract     BILATERAL    Diabetes mellitus, type 2     H/O: Bell's palsy     Hypertension     Wears dentures     FULL       Past Surgical History:   Procedure Laterality Date    HYSTERECTOMY      OOPHORECTOMY      right knee surgery         Review of patient's allergies indicates:   Allergen Reactions    Penicillins Rash    Sulfa (sulfonamide antibiotics) Rash       No current facility-administered medications on file prior to encounter.     Current Outpatient Medications on File Prior to Encounter   Medication Sig    amlodipine (NORVASC) 10 MG tablet Take 1 tablet (10 mg total) by mouth once daily.    aspirin (ECOTRIN) 81 MG EC tablet Take 81 mg by mouth once daily.    carvedilol (COREG) 12.5 MG tablet Take 1 tablet (12.5 mg total) by mouth once daily.    ELIQUIS 5 mg Tab Take 5 mg by mouth 2 (two) times daily.    empagliflozin (JARDIANCE) 10 mg tablet Take 10 mg by mouth once daily.    furosemide (LASIX) 20 MG tablet Take 20 mg by mouth.    furosemide (LASIX) 20 MG tablet Take 1 tablet (20 mg total) by mouth every 8 (eight) hours.    isosorbide mononitrate (IMDUR) 30 MG 24 hr tablet Take 30 mg by mouth.    lisinopril-hydrochlorothiazide (PRINZIDE,ZESTORETIC) 20-25 mg Tab Take 1 tablet by mouth once daily.    lovastatin (MEVACOR) 20 MG tablet Take 1 tablet (20 mg total) by mouth every evening.    metformin (GLUCOPHAGE) 500 MG tablet Take 2 tablets (1,000 mg total) by mouth daily with breakfast.    metoprolol succinate (TOPROL-XL) 50 MG 24 hr tablet Take 50 mg by mouth.    niacin 250 MG Tab Take 250 mg by mouth nightly.    ondansetron (ZOFRAN) 4 MG tablet Take 1 tablet (4 mg total) by mouth every 6 (six) hours.    oxybutynin (DITROPAN) 5 MG Tab Take 1 tablet (5 mg total) by mouth 2 (two) times daily.    vitamin D (VITAMIN D3) 1000 units Tab Take 185 mg by mouth once daily.     Family History       Problem Relation (Age of Onset)    Brain Hemorrhage  Mother (33)    Heart disease Brother, Brother, Brother    No Known Problems Father, Sister, Maternal Aunt, Maternal Uncle, Paternal Aunt, Paternal Uncle, Maternal Grandmother, Maternal Grandfather, Paternal Grandmother, Paternal Grandfather    Ovarian cancer Daughter          Tobacco Use    Smoking status: Never    Smokeless tobacco: Never   Substance and Sexual Activity    Alcohol use: No    Drug use: No    Sexual activity: Not on file     Comment:      Review of Systems   Constitutional:  Positive for activity change and fatigue. Negative for appetite change, chills and fever.   HENT:  Negative for ear pain, mouth sores, nosebleeds and sore throat.    Eyes:  Negative for visual disturbance.   Respiratory:  Positive for cough, shortness of breath and wheezing.    Cardiovascular:  Positive for leg swelling. Negative for chest pain and palpitations.   Gastrointestinal:  Negative for abdominal distention, abdominal pain, blood in stool, constipation, diarrhea, nausea and vomiting.   Endocrine: Negative for polyphagia.   Genitourinary:  Negative for difficulty urinating, dysuria, flank pain and frequency.   Musculoskeletal:  Positive for arthralgias. Negative for back pain and myalgias.   Skin:  Negative for rash.   Neurological:  Positive for weakness. Negative for dizziness, tremors, seizures, syncope, facial asymmetry, speech difficulty and headaches.   Hematological:  Negative for adenopathy.   Psychiatric/Behavioral:  Negative for agitation, confusion and hallucinations. The patient is not nervous/anxious.      Objective:     Vital Signs (Most Recent):  Temp: 97.4 °F (36.3 °C) (03/07/24 1552)  Pulse: 86 (03/07/24 1552)  Resp: (!) 21 (03/07/24 1552)  BP: 132/83 (03/07/24 1552)  SpO2: (!) 93 % (03/07/24 1552) Vital Signs (24h Range):  Temp:  [97.4 °F (36.3 °C)-98 °F (36.7 °C)] 97.4 °F (36.3 °C)  Pulse:  [78-86] 86  Resp:  [18-21] 21  SpO2:  [93 %-95 %] 93 %  BP: (130-138)/(83-89) 132/83     Weight: 96.3 kg (212  lb 6.4 oz)  Body mass index is 33.27 kg/m².     Physical Exam  Constitutional:       General: She is not in acute distress.     Appearance: She is ill-appearing (Chronically ill-appearing). She is not toxic-appearing.   HENT:      Head: Normocephalic and atraumatic.      Nose: No congestion or rhinorrhea.      Mouth/Throat:      Pharynx: No oropharyngeal exudate.      Comments: Dentures  Eyes:      General: No scleral icterus.  Cardiovascular:      Rate and Rhythm: Normal rate. Rhythm irregular.      Pulses: Normal pulses.      Heart sounds: Normal heart sounds. No murmur heard.     No friction rub. No gallop.   Pulmonary:      Effort: No respiratory distress.      Breath sounds: No stridor. Rales (Bibasilar, worse on right) present. No wheezing or rhonchi.   Chest:      Chest wall: No tenderness.   Abdominal:      General: There is no distension.      Palpations: There is no mass.      Tenderness: There is no abdominal tenderness. There is no right CVA tenderness, left CVA tenderness, guarding or rebound.      Hernia: No hernia is present.   Musculoskeletal:         General: No swelling, tenderness or deformity.      Cervical back: Normal range of motion and neck supple. No rigidity or tenderness.      Right lower leg: Edema present.      Left lower leg: Edema present.   Lymphadenopathy:      Cervical: No cervical adenopathy.   Skin:     General: Skin is warm and dry.      Capillary Refill: Capillary refill takes less than 2 seconds.      Coloration: Skin is not jaundiced or pale.      Findings: No rash.   Neurological:      Mental Status: She is alert and oriented to person, place, and time.      Motor: Weakness (Generalized) present.   Psychiatric:         Mood and Affect: Mood normal.         Behavior: Behavior normal.                Significant Labs: All pertinent labs within the past 24 hours have been reviewed.  CBC:   Recent Labs   Lab 03/07/24  1331   WBC 6.69   HGB 14.1   HCT 40.8        CMP:    Recent Labs   Lab 03/07/24  1331      K 3.6      CO2 30*   GLU 89   BUN 13   CREATININE 1.5*   CALCIUM 10.1   PROT 7.8   ALBUMIN 3.6   BILITOT 1.1*   ALKPHOS 143*   AST 15   ALT 13   ANIONGAP 3       Significant Imaging: I have reviewed all pertinent imaging results/findings within the past 24 hours.

## 2024-03-07 NOTE — ASSESSMENT & PLAN NOTE
Patient noted with HFrEF (30%) per report from Dr. Real.  Patient admitted for diuresis, close monitoring.  Starting patient on continuous Lasix infusion.  Resuming Jardiance.  Strict I&Os, 1500 mL fluid restriction.  Daily weights.

## 2024-03-07 NOTE — HPI
ED HPI: 79-year-old female history of asthma, diabetes, hypertension, CHF presents to ED sent by cardiologist for CHF exacerbation.  Patient reports orthopnea and dyspnea on exertion worsening in nature for the past several days.  Has been taking her Lasix 20 mg twice a day.  Denies any chest pain.  Also endorses bilateral lower extremity swelling.     IM HPI:  79-year-old  female with a past medical history of arthritis, asthma, cataracts, type 2 diabetes, hypertension, CHF presented to the emergency department today from Dr. Real's office for acute CHF exacerbation.  Patient noted with an EF of 30 as well as rhythm change on EKG.  Patient was supposed to establish care in the clinic with me this afternoon, and had a follow up with Dr. Real this morning from her emergency department visit.  Patient reports she was previously established with ENRIKE Guaman at Dr. Muna Boswell's office, but reports she was not aware the clinic closed and when she attempted to call to make an appointment, there was no answer.  Patient reports she was evaluated in the emergency department on 02/23/2024 for shortness of breath as well as lower extremity edema.  At that time patient's chest x-ray was clear other than cardiomegaly, Dr. Real was consulted, and he recommended IV Lasix in the emergency department as well as increasing her Lasix in an outpatient setting and follow up this week.  Patient reports she has home oxygen that has been set up for quite some time, but she has not had to use it, but does endorse using it this week due to worsening dyspnea at rest as well as exertional dyspnea.  Patient reports 3 pillow orthopnea.  Patient denies any chest pain.  Chest x-ray in the emergency department demonstrates cardiomegaly and right basilar infiltrate or atelectasis and right pleural effusion.  Cannot exclude adenopathy on the right consider CT of the chest with IV contrast.  Patient's creatinine at this  time is 1.5, we will need to monitor to determine if patient can tolerate IV contrast.  BNP 2018 which is increased from 1483 from previous emergency department visit.  POCT COVID/flu negative.  Patient given IV Lasix injection in the emergency department, is maintaining oxygen saturation on room air.  Patient admitted for further diuresis and monitoring.  We will start patient on continuous IV Lasix infusion.  Consulting Dr. Real to follow patient during hospitalization.

## 2024-03-07 NOTE — ASSESSMENT & PLAN NOTE
Chronic, controlled. Latest blood pressure and vitals reviewed-     Temp:  [97.4 °F (36.3 °C)-98 °F (36.7 °C)]   Pulse:  [78-86]   Resp:  [18-21]   BP: (130-138)/(83-89)   SpO2:  [93 %-95 %] .   Home meds for hypertension were reviewed and noted below.   Hypertension Medications               amlodipine (NORVASC) 10 MG tablet Take 1 tablet (10 mg total) by mouth once daily.    carvedilol (COREG) 12.5 MG tablet Take 1 tablet (12.5 mg total) by mouth once daily.    furosemide (LASIX) 20 MG tablet Take 20 mg by mouth.    furosemide (LASIX) 20 MG tablet Take 1 tablet (20 mg total) by mouth every 8 (eight) hours.    isosorbide mononitrate (IMDUR) 30 MG 24 hr tablet Take 30 mg by mouth.    lisinopril-hydrochlorothiazide (PRINZIDE,ZESTORETIC) 20-25 mg Tab Take 1 tablet by mouth once daily.    metoprolol succinate (TOPROL-XL) 50 MG 24 hr tablet Take 50 mg by mouth.            Resuming metoprolol and starting patient on IV Lasix infusion

## 2024-03-07 NOTE — Clinical Note
Diagnosis: Congestive heart failure, unspecified HF chronicity, unspecified heart failure type [[id number]]   Future Attending Provider: JOSE REES [[id number]]

## 2024-03-07 NOTE — ED PROVIDER NOTES
Encounter Date: 3/7/2024       History     Chief Complaint   Patient presents with    Shortness of Breath     Pt sent here by Dr. Real for SOB, EF of 30%, and fluid retention.  Legs with edema and reports different rhythm noticed in office.  Dr. Guzman to admit for Farhan.     79-year-old female history of asthma, diabetes, hypertension, CHF presents to ED sent by cardiologist for CHF exacerbation.  Patient reports orthopnea and dyspnea on exertion worsening in nature for the past several days.  Has been taking her Lasix 20 mg twice a day.  Denies any chest pain.  Also endorses bilateral lower extremity swelling.        Review of patient's allergies indicates:   Allergen Reactions    Penicillins Rash    Sulfa (sulfonamide antibiotics) Rash     Past Medical History:   Diagnosis Date    Arthritis     Asthma     Cataract     BILATERAL    Diabetes mellitus, type 2     H/O: Bell's palsy     Hypertension     Wears dentures     FULL     Past Surgical History:   Procedure Laterality Date    HYSTERECTOMY      OOPHORECTOMY      right knee surgery       Family History   Problem Relation Age of Onset    Brain Hemorrhage Mother 33    No Known Problems Father     No Known Problems Sister     Ovarian cancer Daughter     No Known Problems Maternal Aunt     No Known Problems Maternal Uncle     No Known Problems Paternal Aunt     No Known Problems Paternal Uncle     No Known Problems Maternal Grandmother     No Known Problems Maternal Grandfather     No Known Problems Paternal Grandmother     No Known Problems Paternal Grandfather     Heart disease Brother     Heart disease Brother     Heart disease Brother     Breast cancer Neg Hx     BRCA 1/2 Neg Hx      Social History     Tobacco Use    Smoking status: Never    Smokeless tobacco: Never   Substance Use Topics    Alcohol use: No    Drug use: No     Review of Systems    Physical Exam     Initial Vitals   BP Pulse Resp Temp SpO2   03/07/24 1302 03/07/24 1304 03/07/24 1302 03/07/24  1304 03/07/24 1304   138/89 86 18 98 °F (36.7 °C) 95 %      MAP       --                Physical Exam    Constitutional: She appears well-developed and well-nourished.   HENT:   Head: Normocephalic and atraumatic.   Eyes: Pupils are equal, round, and reactive to light.   Neck:   Normal range of motion.  Cardiovascular:  Regular rhythm.           Pulmonary/Chest: She has rales.   Abdominal: Abdomen is soft.   Musculoskeletal:         General: Normal range of motion.      Cervical back: Normal range of motion.      Comments: 2+ bilateral lower extremity pitting edema     Neurological: She is alert.   Skin: Skin is warm. Capillary refill takes less than 2 seconds.         ED Course   Procedures  Labs Reviewed   CBC W/ AUTO DIFFERENTIAL - Abnormal; Notable for the following components:       Result Value    RDW 17.2 (*)     All other components within normal limits   COMPREHENSIVE METABOLIC PANEL - Abnormal; Notable for the following components:    CO2 30 (*)     Creatinine 1.5 (*)     Total Bilirubin 1.1 (*)     Alkaline Phosphatase 143 (*)     eGFR 35.4 (*)     All other components within normal limits   NT-PRO NATRIURETIC PEPTIDE - Abnormal; Notable for the following components:    NT-proBNP 2018 (*)     All other components within normal limits   TROPONIN I HIGH SENSITIVITY   SARS-COV-2 RDRP GENE   POCT INFLUENZA A/B MOLECULAR     EKG Readings: (Independently Interpreted)   AFib with PVC  Ventricular rate 90   Normal axis   No STEMI   QTC is 445          Imaging Results              X-Ray Chest AP Portable (In process)  Result time 03/07/24 14:00:12                     Medications - No data to display  Medical Decision Making  Presents to ED for CHF exacerbation.  Has 2+ pitting edema, rales in lung fields.  Plan for labs, CBC, chemistry, troponin, BNP, chest x-ray, reassess.        BNP is elevated, chest x-ray shows fluid.  We will be given IV Lasix and admitted to hospitalist.  Cardiology consulted.    Amount  and/or Complexity of Data Reviewed  Labs: ordered.  Radiology: ordered.                                      Clinical Impression:  Final diagnoses:  [R06.02] Shortness of breath                 Bipin Reno MD  03/07/24 1998

## 2024-03-07 NOTE — H&P
[facility] Medicine  History & Physical    Patient Name: Edyta Gomez  MRN: [id number]  Patient Class: OP- Observation  Admission Date: 3/7/2024  Attending Physician: Amanda Guzman MD   Primary Care Provider: Amanda Guzman MD         Patient information was obtained from patient, past medical records, ER records, and Cardiology .     Subjective:     Principal Problem:Heart failure with reduced ejection fraction    Chief Complaint:   Chief Complaint   Patient presents with    Shortness of Breath     Pt sent here by Dr. Real for SOB, EF of 30%, and fluid retention.  Legs with edema and reports different rhythm noticed in office.  Dr. Guzman to admit for Farhan.        HPI: ED HPI: 79-year-old female history of asthma, diabetes, hypertension, CHF presents to ED sent by cardiologist for CHF exacerbation.  Patient reports orthopnea and dyspnea on exertion worsening in nature for the past several days.  Has been taking her Lasix 20 mg twice a day.  Denies any chest pain.  Also endorses bilateral lower extremity swelling.     IM HPI:  79-year-old  female with a past medical history of arthritis, asthma, cataracts, type 2 diabetes, hypertension, CHF presented to the emergency department today from Dr. Real's office for acute CHF exacerbation.  Patient noted with an EF of 30 as well as rhythm change on EKG.  Patient was supposed to establish care in the clinic with me this afternoon, and had a follow up with Dr. Real this morning from her emergency department visit.  Patient reports she was previously established with ENRIKE Guaman at Dr. Muna Boswell's office, but reports she was not aware the clinic closed and when she attempted to call to make an appointment, there was no answer.  Patient reports she was evaluated in the emergency department on 02/23/2024 for shortness of breath as well as lower extremity edema.  At that time patient's chest x-ray was clear  other than cardiomegaly, Dr. Real was consulted, and he recommended IV Lasix in the emergency department as well as increasing her Lasix in an outpatient setting and follow up this week.  Patient reports she has home oxygen that has been set up for quite some time, but she has not had to use it, but does endorse using it this week due to worsening dyspnea at rest as well as exertional dyspnea.  Patient reports 3 pillow orthopnea.  Patient denies any chest pain.  Chest x-ray in the emergency department demonstrates cardiomegaly and right basilar infiltrate or atelectasis and right pleural effusion.  Cannot exclude adenopathy on the right consider CT of the chest with IV contrast.  Patient's creatinine at this time is 1.5, we will need to monitor to determine if patient can tolerate IV contrast.  BNP 2018 which is increased from 1483 from previous emergency department visit.  POCT COVID/flu negative.  Patient given IV Lasix injection in the emergency department, is maintaining oxygen saturation on room air.  Patient admitted for further diuresis and monitoring.  We will start patient on continuous IV Lasix infusion.  Consulting Dr. Real to follow patient during hospitalization.    Past Medical History:   Diagnosis Date    Arthritis     Asthma     Cataract     BILATERAL    Diabetes mellitus, type 2     H/O: Bell's palsy     Hypertension     Wears dentures     FULL       Past Surgical History:   Procedure Laterality Date    HYSTERECTOMY      OOPHORECTOMY      right knee surgery         Review of patient's allergies indicates:   Allergen Reactions    Penicillins Rash    Sulfa (sulfonamide antibiotics) Rash       No current facility-administered medications on file prior to encounter.     Current Outpatient Medications on File Prior to Encounter   Medication Sig    amlodipine (NORVASC) 10 MG tablet Take 1 tablet (10 mg total) by mouth once daily.    aspirin (ECOTRIN) 81 MG EC tablet Take 81 mg by mouth once daily.     carvedilol (COREG) 12.5 MG tablet Take 1 tablet (12.5 mg total) by mouth once daily.    ELIQUIS 5 mg Tab Take 5 mg by mouth 2 (two) times daily.    empagliflozin (JARDIANCE) 10 mg tablet Take 10 mg by mouth once daily.    furosemide (LASIX) 20 MG tablet Take 20 mg by mouth.    furosemide (LASIX) 20 MG tablet Take 1 tablet (20 mg total) by mouth every 8 (eight) hours.    isosorbide mononitrate (IMDUR) 30 MG 24 hr tablet Take 30 mg by mouth.    lisinopril-hydrochlorothiazide (PRINZIDE,ZESTORETIC) 20-25 mg Tab Take 1 tablet by mouth once daily.    lovastatin (MEVACOR) 20 MG tablet Take 1 tablet (20 mg total) by mouth every evening.    metformin (GLUCOPHAGE) 500 MG tablet Take 2 tablets (1,000 mg total) by mouth daily with breakfast.    metoprolol succinate (TOPROL-XL) 50 MG 24 hr tablet Take 50 mg by mouth.    niacin 250 MG Tab Take 250 mg by mouth nightly.    ondansetron (ZOFRAN) 4 MG tablet Take 1 tablet (4 mg total) by mouth every 6 (six) hours.    oxybutynin (DITROPAN) 5 MG Tab Take 1 tablet (5 mg total) by mouth 2 (two) times daily.    vitamin D (VITAMIN D3) 1000 units Tab Take 185 mg by mouth once daily.     Family History       Problem Relation (Age of Onset)    Brain Hemorrhage Mother (33)    Heart disease Brother, Brother, Brother    No Known Problems Father, Sister, Maternal Aunt, Maternal Uncle, Paternal Aunt, Paternal Uncle, Maternal Grandmother, Maternal Grandfather, Paternal Grandmother, Paternal Grandfather    Ovarian cancer Daughter          Tobacco Use    Smoking status: Never    Smokeless tobacco: Never   Substance and Sexual Activity    Alcohol use: No    Drug use: No    Sexual activity: Not on file     Comment:      Review of Systems   Constitutional:  Positive for activity change and fatigue. Negative for appetite change, chills and fever.   HENT:  Negative for ear pain, mouth sores, nosebleeds and sore throat.    Eyes:  Negative for visual disturbance.   Respiratory:  Positive for cough,  shortness of breath and wheezing.    Cardiovascular:  Positive for leg swelling. Negative for chest pain and palpitations.   Gastrointestinal:  Negative for abdominal distention, abdominal pain, blood in stool, constipation, diarrhea, nausea and vomiting.   Endocrine: Negative for polyphagia.   Genitourinary:  Negative for difficulty urinating, dysuria, flank pain and frequency.   Musculoskeletal:  Positive for arthralgias. Negative for back pain and myalgias.   Skin:  Negative for rash.   Neurological:  Positive for weakness. Negative for dizziness, tremors, seizures, syncope, facial asymmetry, speech difficulty and headaches.   Hematological:  Negative for adenopathy.   Psychiatric/Behavioral:  Negative for agitation, confusion and hallucinations. The patient is not nervous/anxious.      Objective:     Vital Signs (Most Recent):  Temp: 97.4 °F (36.3 °C) (03/07/24 1552)  Pulse: 86 (03/07/24 1552)  Resp: (!) 21 (03/07/24 1552)  BP: 132/83 (03/07/24 1552)  SpO2: (!) 93 % (03/07/24 1552) Vital Signs (24h Range):  Temp:  [97.4 °F (36.3 °C)-98 °F (36.7 °C)] 97.4 °F (36.3 °C)  Pulse:  [78-86] 86  Resp:  [18-21] 21  SpO2:  [93 %-95 %] 93 %  BP: (130-138)/(83-89) 132/83     Weight: 96.3 kg (212 lb 6.4 oz)  Body mass index is 33.27 kg/m².     Physical Exam  Constitutional:       General: She is not in acute distress.     Appearance: She is ill-appearing (Chronically ill-appearing). She is not toxic-appearing.   HENT:      Head: Normocephalic and atraumatic.      Nose: No congestion or rhinorrhea.      Mouth/Throat:      Pharynx: No oropharyngeal exudate.      Comments: Dentures  Eyes:      General: No scleral icterus.  Cardiovascular:      Rate and Rhythm: Normal rate. Rhythm irregular.      Pulses: Normal pulses.      Heart sounds: Normal heart sounds. No murmur heard.     No friction rub. No gallop.   Pulmonary:      Effort: No respiratory distress.      Breath sounds: No stridor. Rales (Bibasilar, worse on right)  present. No wheezing or rhonchi.   Chest:      Chest wall: No tenderness.   Abdominal:      General: There is no distension.      Palpations: There is no mass.      Tenderness: There is no abdominal tenderness. There is no right CVA tenderness, left CVA tenderness, guarding or rebound.      Hernia: No hernia is present.   Musculoskeletal:         General: No swelling, tenderness or deformity.      Cervical back: Normal range of motion and neck supple. No rigidity or tenderness.      Right lower leg: Edema present.      Left lower leg: Edema present.   Lymphadenopathy:      Cervical: No cervical adenopathy.   Skin:     General: Skin is warm and dry.      Capillary Refill: Capillary refill takes less than 2 seconds.      Coloration: Skin is not jaundiced or pale.      Findings: No rash.   Neurological:      Mental Status: She is alert and oriented to person, place, and time.      Motor: Weakness (Generalized) present.   Psychiatric:         Mood and Affect: Mood normal.         Behavior: Behavior normal.                Significant Labs: All pertinent labs within the past 24 hours have been reviewed.  CBC:   Recent Labs   Lab 03/07/24  1331   WBC 6.69   HGB 14.1   HCT 40.8        CMP:   Recent Labs   Lab 03/07/24  1331      K 3.6      CO2 30*   GLU 89   BUN 13   CREATININE 1.5*   CALCIUM 10.1   PROT 7.8   ALBUMIN 3.6   BILITOT 1.1*   ALKPHOS 143*   AST 15   ALT 13   ANIONGAP 3       Significant Imaging: I have reviewed all pertinent imaging results/findings within the past 24 hours.  Assessment/Plan:     * Heart failure with reduced ejection fraction  Patient noted with HFrEF (30%) per report from Dr. Real.  Patient admitted for diuresis, close monitoring.  Starting patient on continuous Lasix infusion.  Resuming Jardiance.  Strict I&Os, 1500 mL fluid restriction.  Daily weights.      Type 2 diabetes mellitus, without long-term current use of insulin  Obtaining hemoglobin A1c today.  Sliding scale  insulin.      Hypertension  Chronic, controlled. Latest blood pressure and vitals reviewed-     Temp:  [97.4 °F (36.3 °C)-98 °F (36.7 °C)]   Pulse:  [78-86]   Resp:  [18-21]   BP: (130-138)/(83-89)   SpO2:  [93 %-95 %] .   Home meds for hypertension were reviewed and noted below.   Hypertension Medications               amlodipine (NORVASC) 10 MG tablet Take 1 tablet (10 mg total) by mouth once daily.    carvedilol (COREG) 12.5 MG tablet Take 1 tablet (12.5 mg total) by mouth once daily.    furosemide (LASIX) 20 MG tablet Take 20 mg by mouth.    furosemide (LASIX) 20 MG tablet Take 1 tablet (20 mg total) by mouth every 8 (eight) hours.    isosorbide mononitrate (IMDUR) 30 MG 24 hr tablet Take 30 mg by mouth.    lisinopril-hydrochlorothiazide (PRINZIDE,ZESTORETIC) 20-25 mg Tab Take 1 tablet by mouth once daily.    metoprolol succinate (TOPROL-XL) 50 MG 24 hr tablet Take 50 mg by mouth.            Resuming metoprolol and starting patient on IV Lasix infusion    Hyperlipidemia  Atorvastatin 40 mg daily.        VTE Risk Mitigation (From admission, onward)           Ordered     apixaban tablet 5 mg  2 times daily         03/07/24 1622     IP VTE HIGH RISK PATIENT  Once         03/07/24 1441     Place sequential compression device  Until discontinued         03/07/24 1441                         On 03/07/2024, patient should be placed in hospital observation services under my care in collaboration with Dr. Guzman.           JANET TA  Department of Hospital Medicine  [facility]

## 2024-03-08 PROBLEM — I48.91 ATRIAL FIBRILLATION: Status: ACTIVE | Noted: 2024-03-08

## 2024-03-08 LAB
ALBUMIN SERPL BCP-MCNC: 3.3 G/DL (ref 3.5–5.2)
ALP SERPL-CCNC: 135 U/L (ref 55–135)
ALT SERPL W/O P-5'-P-CCNC: 13 U/L (ref 10–44)
ANION GAP SERPL CALC-SCNC: 6 MMOL/L (ref 3–11)
AST SERPL-CCNC: 13 U/L (ref 10–40)
BASOPHILS # BLD AUTO: 0.07 K/UL (ref 0–0.2)
BASOPHILS NFR BLD: 1 % (ref 0–1.9)
BILIRUB SERPL-MCNC: 1 MG/DL (ref 0.1–1)
BUN SERPL-MCNC: 13 MG/DL (ref 8–23)
CALCIUM SERPL-MCNC: 9.8 MG/DL (ref 8.7–10.5)
CHLORIDE SERPL-SCNC: 105 MMOL/L (ref 95–110)
CO2 SERPL-SCNC: 30 MMOL/L (ref 23–29)
CREAT SERPL-MCNC: 1.3 MG/DL (ref 0.5–1.4)
DIFFERENTIAL METHOD BLD: ABNORMAL
EOSINOPHIL # BLD AUTO: 0.3 K/UL (ref 0–0.5)
EOSINOPHIL NFR BLD: 4.3 % (ref 0–8)
ERYTHROCYTE [DISTWIDTH] IN BLOOD BY AUTOMATED COUNT: 17.3 % (ref 11.5–14.5)
EST. GFR  (NO RACE VARIABLE): 42.1 ML/MIN/1.73 M^2
GLUCOSE SERPL-MCNC: 90 MG/DL (ref 70–110)
HCT VFR BLD AUTO: 37.2 % (ref 37–48.5)
HGB BLD-MCNC: 12.7 G/DL (ref 12–16)
IMM GRANULOCYTES # BLD AUTO: 0.01 K/UL (ref 0–0.04)
IMM GRANULOCYTES NFR BLD AUTO: 0.1 % (ref 0–0.5)
LYMPHOCYTES # BLD AUTO: 1.3 K/UL (ref 1–4.8)
LYMPHOCYTES NFR BLD: 19.3 % (ref 18–48)
MAGNESIUM SERPL-MCNC: 2.3 MG/DL (ref 1.6–2.6)
MCH RBC QN AUTO: 30.3 PG (ref 27–31)
MCHC RBC AUTO-ENTMCNC: 34.1 G/DL (ref 32–36)
MCV RBC AUTO: 89 FL (ref 82–98)
MONOCYTES # BLD AUTO: 0.6 K/UL (ref 0.3–1)
MONOCYTES NFR BLD: 9.4 % (ref 4–15)
NEUTROPHILS # BLD AUTO: 4.4 K/UL (ref 1.8–7.7)
NEUTROPHILS NFR BLD: 65.9 % (ref 38–73)
NRBC BLD-RTO: 0 /100 WBC
OHS QRS DURATION: 82 MS
OHS QTC CALCULATION: 445 MS
PLATELET # BLD AUTO: 205 K/UL (ref 150–450)
PMV BLD AUTO: 11.1 FL (ref 9.2–12.9)
POCT GLUCOSE: 103 MG/DL (ref 70–110)
POCT GLUCOSE: 109 MG/DL (ref 70–110)
POCT GLUCOSE: 87 MG/DL (ref 70–110)
POTASSIUM SERPL-SCNC: 3.2 MMOL/L (ref 3.5–5.1)
PROT SERPL-MCNC: 7.4 G/DL (ref 6–8.4)
RBC # BLD AUTO: 4.19 M/UL (ref 4–5.4)
SODIUM SERPL-SCNC: 141 MMOL/L (ref 136–145)
WBC # BLD AUTO: 6.72 K/UL (ref 3.9–12.7)

## 2024-03-08 PROCEDURE — 27000221 HC OXYGEN, UP TO 24 HOURS

## 2024-03-08 PROCEDURE — 36415 COLL VENOUS BLD VENIPUNCTURE: CPT

## 2024-03-08 PROCEDURE — 97161 PT EVAL LOW COMPLEX 20 MIN: CPT

## 2024-03-08 PROCEDURE — 25000003 PHARM REV CODE 250: Performed by: INTERNAL MEDICINE

## 2024-03-08 PROCEDURE — 83735 ASSAY OF MAGNESIUM: CPT

## 2024-03-08 PROCEDURE — 99900035 HC TECH TIME PER 15 MIN (STAT)

## 2024-03-08 PROCEDURE — 25000003 PHARM REV CODE 250

## 2024-03-08 PROCEDURE — 21400001 HC TELEMETRY ROOM

## 2024-03-08 PROCEDURE — 85025 COMPLETE CBC W/AUTO DIFF WBC: CPT

## 2024-03-08 PROCEDURE — 80053 COMPREHEN METABOLIC PANEL: CPT

## 2024-03-08 PROCEDURE — 99900031 HC PATIENT EDUCATION (STAT)

## 2024-03-08 PROCEDURE — 94761 N-INVAS EAR/PLS OXIMETRY MLT: CPT

## 2024-03-08 RX ORDER — POTASSIUM CHLORIDE 20 MEQ/1
20 TABLET, EXTENDED RELEASE ORAL 2 TIMES DAILY
Status: DISCONTINUED | OUTPATIENT
Start: 2024-03-08 | End: 2024-03-11 | Stop reason: HOSPADM

## 2024-03-08 RX ORDER — ASPIRIN 81 MG/1
81 TABLET ORAL NIGHTLY
Status: DISCONTINUED | OUTPATIENT
Start: 2024-03-08 | End: 2024-03-11 | Stop reason: HOSPADM

## 2024-03-08 RX ADMIN — ASPIRIN 81 MG: 81 TABLET ORAL at 08:03

## 2024-03-08 RX ADMIN — APIXABAN 5 MG: 5 TABLET, FILM COATED ORAL at 09:03

## 2024-03-08 RX ADMIN — POTASSIUM CHLORIDE 20 MEQ: 1500 TABLET, EXTENDED RELEASE ORAL at 08:03

## 2024-03-08 RX ADMIN — POTASSIUM CHLORIDE 20 MEQ: 1500 TABLET, EXTENDED RELEASE ORAL at 09:03

## 2024-03-08 RX ADMIN — ATORVASTATIN CALCIUM 40 MG: 40 TABLET, FILM COATED ORAL at 08:03

## 2024-03-08 RX ADMIN — METOPROLOL SUCCINATE 50 MG: 50 TABLET, EXTENDED RELEASE ORAL at 09:03

## 2024-03-08 RX ADMIN — ISOSORBIDE MONONITRATE 30 MG: 30 TABLET, EXTENDED RELEASE ORAL at 09:03

## 2024-03-08 RX ADMIN — APIXABAN 5 MG: 5 TABLET, FILM COATED ORAL at 08:03

## 2024-03-08 RX ADMIN — SPIRONOLACTONE 25 MG: 25 TABLET ORAL at 09:03

## 2024-03-08 RX ADMIN — DAPAGLIFLOZIN 10 MG: 10 TABLET, FILM COATED ORAL at 09:03

## 2024-03-08 NOTE — HOSPITAL COURSE
3/8 GT:  Patient is doing well this morning.  Patient noted with a mild hypokalemia due to continuous Lasix infusion, we will increase potassium to 20 mEq b.i.d. and continue to monitor with daily labs.  Patient is diuresing well, edema to bilateral lower extremities significantly improved from yesterday.  Repeat chest x-ray this morning demonstrates some improvement in pulmonary edema, awaiting official radiology read.  Patient reports she is still having some shortness of breath, but reports it is improving.  Patient is on continuous supplemental oxygen.  We will add DuoNebs to patient's medication regimen.  3/9 FM:  Patient much improved, less dyspnea less work of breathing resting comfortably this morning.  I am discontinuing her Lasix drip and converting to p.o. Lasix patient is on an excellent heart failure medical regimen we will monitor her through the day today and likely discharge later this afternoon or in the morning.  3/10 FM:  Patient last night had an episode of chest pain.  Patient's pain was across her chest lasted for several minutes resolve with Tylenol.  Patient and I discussed discharge plan and she would prefer to stay another night and speak with her cardiologist tomorrow.  We will change her Lasix back to IV she still has peripheral edema she is still requiring oxygen will have the nursing staff wean her O2.  3/11 GT:  Patient is doing well and has diuresed significantly since admit.  Patient meds has been reviewed and she will be discharged home on previous medication regimen.  Patient needs to follow up with me in clinic in one-week as well as Cardiology.  Patient will be discharged home with home health.  Orders for requested DME entered.  Patient has home oxygen.  Strict return precautions given.  Patient verbalized understanding.

## 2024-03-08 NOTE — PLAN OF CARE
03/08/24 1548   Post-Acute Status   Post-Acute Authorization Home Health   Home Health Status Referrals Sent   Patient choice form signed by patient/caregiver   (Per granddaughter, wanted a local agency. [facility] patients insurance.)   Discharge Delays None known at this time   Discharge Plan   Discharge Plan A Home Health   Discharge Plan B Home with family     Referral sent via [facility] to [facility] for home health services on patient.

## 2024-03-08 NOTE — CONSULTS
STEPHANIE Real MD  [address] [place] [address],  [address]  [place] [address]  Phone:  [phone]  Fax:  [phone]  Email: [email or web address]  _______________________________________________________________  Cardiac consultation:    Today's Date:  3/7/2024  Patient Name: Edyta Gomez    MRN: [id number]    Code Status: Full Code     Attending Physician: Amanda Guzman MD   Consulting Physician: STEPHANIE Real MD  ______________________________________________________________________    Reason for Consult:   Congestive heart failure with failed outpatient therapy.    Temp: 97.4 °F (36.3 °C) (03/07/24 1552)  Pulse: 83 (03/07/24 1738)  Resp: 18 (03/07/24 1738)  BP: 132/83 (03/07/24 1552)  SpO2: 97 % (03/07/24 1738)    Subjective:   Clinical History & Interval History:    7/20/2022:  The patient is a 77-year-old lady whose past medical history is remarkable for hypertension, hyperlipidemia, and insulin resistance.    She presents to the clinic today with progressive shortness of breath. She states that she has been experiencing shortness of breath for quite a long time, however recently it has been progressive.  The patient states that her shortness of breath has resulted in reducing her activity, and has significantly altered her lifestyle.  She states for example that she is no longer able to go on trips with family because of progressive dyspnea.  She denies chest pain.    8/17/2022:  Laboratory studies:  Chemistries (7/21/2022):  BNP: 1927  TSH 1.38 Free T4 1.05  CMP: sodium-142 potassium-3.4 glucose-92 BUN-10 creatinine-1.1 liver function studies normal  Lipid panel: total cholesterol-124 triglycerides-71 HDL-66 LDL-43  D-dimer 0.59 sed rate 12    The patient is not experiencing syncope, dizziness, or focal neurologic symptoms.    8/24/2022:  The patient follows up in the clinic today feeling much better  since adding Lasix, Jardiance, potassium, aspirin, beta-blocker therapy, and  nitrates.      The swelling in her lower extremities has resolved, and she is breathing better and moving better.  She is no longer experiencing symptoms of orthopnea.    9/15/2022:  The patient follows up in the clinic today overall doing well.  She states that she is breathing much better, and not requiring oxygen therapy.    She has been tolerating medications well which has really impacted her breathing and her overall health status.    10/27/2022:  The patient is feeling much better.  She is no longer experiencing symptoms of shortness of breath or orthopnea (CHF).  She states that she is feeling much better, and is now able to carry out routine daily activities.    She states that she is not compliant with the Jardiance therapy and would like to discontinue it.    2/1/2023:   The patient follows up in the clinic today doing pretty well.  She has multiple risk factors for cardiovascular disease and an increased calcium score.  The CTA suggested significant disease along the distal LAD.  She remains asymptomatic.  She and I have talked about distal LAD disease and the fact that she remains asymptomatic.    10/19/2023:  The patient follows up for routine evaluation.  She has a known history of cardiovascular disease.  She underwent cardiac CTA: Distal LAD disease with a high calcium score.  In addition, she has numerous risk factors for cardiovascular disease.  She has been treated conservatively with medication.  She has done well and remains asymptomatic.    She follows up today and continues to do well.  She denies symptoms of angina.  She is tolerating her medications well.    11/1/2023:  The patient follows up for routine evaluation.  History of coronary artery disease as per CTA as well as a high calcium score.  She is being managed conservatively.      Today she is doing well without symptoms of angina.      She underwent laboratory testing 10/20/2023:  CBC:   WBC 6.97, hemoglobin 4.18, platelets 198  CMP:    Sodium 143, potassium 4.3, glucose 89, BUN 16, creatinine 1.4, liver function studies normal  Lipid panel:   Total cholesterol 145, triglycerides 53, HDL 74, LDL 60.4  HbA1c: 6.0  CRP 0.38 (0- 0.75)    2/29/2024:  The patient was recently seen in the emergency department at which time she was significantly volume overloaded for which she was given Lasix IV 80 mg daily.  (She had been on 20 mg/day.)  She responded well to IV diuretic therapy.  She was discharged on 20 mg twice daily.    Although the patient is feeling much better, she continues to experience shortness of breath as well as dyspnea on exertion.    Laboratory studies (2/23/2024):  CBC: White count 5.58, hemoglobin 13.3, platelets 213  Chemistries: Sodium 141, potassium 3.6, chloride 110, bicarb 27, glucose 96, BUN 10, creatinine 1.1, liver function studies: Normal  Troponin I: 12.5 (normal).  BNP: 1483      3/7/2024:  The patient follows up for routine evaluation.  During her last visit, she was feeling better but continues to experience symptoms of shortness of breath as well as dyspnea on exertion at low levels of activity.  A week prior to that she had undergone laboratory evaluation (2/23/2024).  She was noted to have an elevated BNP of 1483.  At that time she had been seen in the ED  where she was significantly volume overloaded for which she was given Lasix 80 mg IV.    During her last visit I increased Lasix to 40 mg twice daily, instituted and continued carvedilol and amlodipine therapy.    She follows up today not doing well; she is experiencing significant shortness of breath, and continues to experience lower extremity edema.    Echocardiogram (3/7/2024): EF 35% with global hypokinesis; severe tricuspid regurgitation with a significantly dilated right atrium and mild mitral regurgitation.    Medical History & Active Cardiac Medical Problems:  Congestive heart failure, Reduced EF: 30-35% with global HK  Coronary Artery Disease  Persistent  atrial fibrillation, dilated right atrium  Hyperlipidemia  Hypertension  Diabetes mellitus  Arthritis  Valvular disease,  severe tricuspid regurgitation  Pulmonary Hypertension    Medications:  Furosemide 20 MG,  Start: 03/04/24, Tablet, 1 po BID, ORAL, Qty. 180, Substitutions Allowed, Refills: 1  Potassium Chloride (film Coated) 20 MEQ,  Start: 03/04/24, Tablet, Extended Release, 1 po qd, ORAL, Qty. 90, Substitutions Allowed, Refills: 3  Metoprolol Succinate (film Coated) 50 MG,  Start: 03/04/24, Tablet, Extended Release, 1 po qd, ORAL, Qty. 90, Substitutions Allowed, Refills: 3  Isosorbide Mononitrate (usp,Film-Coated) 30 MG,  Start: 03/04/24, Tablet, Extended Release, 1 po qd, ORAL, Qty. 90, Substitutions Allowed, Refills: 3  Lisinopril/Hydrochlorothiazide (usp) 25 MG-20 MG,  Start: 03/04/24, Tablet, 1 po qd, ORAL, Qty. 90, Substitutions Allowed, Refills: 3  amLODIPine Besylate 5 MG Oral Tablet 5 MG,  Start: 01/22/24, tablet, Take 1 tablet by mouth once daily, , Qty. 90, No Substitutions Allowed, Refills: 0  Carvedilol 6.25 MG Oral Tablet 6.25 MG,  Start: 01/22/24, tablet, Take 1 tablet by mouth twice daily, , Qty. 180, No Substitutions Allowed, Refills: 0  Eliquis 5 MG Oral Tablet 5 MG,  Start: 01/22/24, tablet, Take 1 tablet by mouth twice daily, , Qty. 60, No Substitutions Allowed, Refills: 0  Lovastatin 20 MG,  Start: 10/02/23, Tablet, 1 po daily, ORAL, Qty. 30, Substitutions Allowed, Refills: 0  Potassium Chloride ER 20 MEQ Oral Tablet Extended Release 20 MEQ,  Start: 08/14/23, tablet, extended release, Take 1 tablet by mouth once daily, , Qty. 30, No Substitutions Allowed, Refills: 0  Aspirin 81 MG,  Start: 08/17/22, Tablet, Enteric Coated, 1 po daily, ORAL, Qty. 30, Substitutions Allowed, Refills: 0    *All medications reviewed with the patient.    Allergies:  Sulfa Drugs    Vital signs:  03/07/24, Ht: 5'6 in, Wt: 208 lbs, +/-Wt(lb): -2 lbs 0 oz, Wt(Kg): 94.5, +/-Wt(Kg): -1.00, BMI: 33.57 kg/m², Pulse: 86  bpm, BP: 96/64 mmHg     Review of systems:  GENERAL: No symptoms of generalized weakness.  NEUROLOGIC: No focal neurologic symptoms.  CARDIAC:    Negative:  Chest pain, dizziness, syncope, or orthopnea.  Positive:  Dyspnea on exertion; requiring O2 therapy.  PULMONARY: DENIES cough or wheezing.  GI: NO abdominal pain, guarding, or rebound.  EXTREMITIES:  Leg swelling.    Physical Examination:    GENERAL: Alert and oriented x3.   HEENT: No jugular venous distention.    LUNGS:    Diminished breath sounds.  HEART:   Regular rhythm, bradycardic; grade 3 holosystolic murmur.  EXTREMITIES:   2+ bilateral edema.    Testing/Procedures:  Abnormal EKG (10/19/2023): Sinus bradycardia with a ventricular rate of 51 bpm.  ST-T wave abnormalities - diffuse. Will continue to monitor.     EKG ( 3/7/2024):  Atrial fibrillation with a ventricular rate of 81 bpm; poor R wave progression; nonspecific T wave abnormalities.    Carotid Duplex Scan (10/19/2023): Patient with calcific plaquing bilaterally.  Flow is unimpeded.  Vertebral flow bilaterally is antegrade.  There is hyperechoic plaquing in the left carotid bulb. Will continue to monitor.     Echocardiogram (2/1/2023): The patient has an EF of 60%; biatrial dilatation; the mitral valve is thickened (anterior and posterior mitral valve leaflets) with trace mitral regurgitation.  By color Doppler, she has moderate possibly severe tricuspid regurgitation.  Pulmonary pressures are elevated, estimated at 55 mmHg's. (No interval change.) Will continue to monitor.     Holter monitor recording (7/21/2022 through 7/25/2022):  - Underlying rhythm atrial fibrillation with an average heart rate of 84 bpm. The minimum heart rate was 59 bpm and a maximum of 193 bpm.  The atrial fibrillation burden was 100%.  - No blocks or pauses    Cardiac CTA (8/10/2022):  Evidence of greater than 70% stenosis of the distal aspect of the LAD  Calcium score: 594.    Nuclear stress test (8/31/2022):  Normal  myocardial perfusion scan.  EF 48%.  No chest pain during stress testing.    Ankle Brachial Index (10/25/2023): Right: 1.10, Left: 1:12, Normal    Past Medical History:   Diagnosis Date    Arthritis     Asthma     Cataract     BILATERAL    Diabetes mellitus, type 2     H/O: Bell's palsy     Hypertension     Wears dentures     FULL       Past Surgical History:   Procedure Laterality Date    HYSTERECTOMY      OOPHORECTOMY      right knee surgery         Family History   Problem Relation Age of Onset    Brain Hemorrhage Mother 33    No Known Problems Father     No Known Problems Sister     Ovarian cancer Daughter     No Known Problems Maternal Aunt     No Known Problems Maternal Uncle     No Known Problems Paternal Aunt     No Known Problems Paternal Uncle     No Known Problems Maternal Grandmother     No Known Problems Maternal Grandfather     No Known Problems Paternal Grandmother     No Known Problems Paternal Grandfather     Heart disease Brother     Heart disease Brother     Heart disease Brother     Breast cancer Neg Hx     BRCA 1/2 Neg Hx        Social History     Socioeconomic History    Marital status:    Tobacco Use    Smoking status: Never    Smokeless tobacco: Never   Substance and Sexual Activity    Alcohol use: No    Drug use: No       Medications:  Current Facility-Administered Medications   Medication Dose Route Frequency Provider Last Rate Last Admin    acetaminophen tablet 650 mg  650 mg Oral Q8H PRN Bipin Reno MD        apixaban tablet 5 mg  5 mg Oral BID Kayley Mcclelland, APRN        [START ON 3/8/2024] aspirin EC tablet 81 mg  81 mg Oral Daily Kayley Mcclelland, APRN        atorvastatin tablet 40 mg  40 mg Oral QHS Kayley Mcclelland, APRN        [START ON 3/8/2024] dapagliflozin propanediol (Farxiga) tablet 10 mg  10 mg Oral Daily Kayley Mcclelland, APRN        dextrose 10% bolus 125 mL 125 mL  12.5 g Intravenous PRN Kayley Mcclelland, APRN        dextrose 10% bolus 250 mL 250 mL  25 g Intravenous PRN  Kayley Mcclelland APRN        furosemide (Lasix) 200 mg in sodium chloride 0.9% SolP 100 mL continuous infusion (conc: 2 mg/mL)  5 mg/hr Intravenous Continuous Kayley Mcclelland APRN        glucagon (human recombinant) injection 1 mg  1 mg Intramuscular PRN Kayley Mcclelland APRN        glucose chewable tablet 16 g  16 g Oral PRN Kayley Mcclelland, JANET        glucose chewable tablet 24 g  24 g Oral PRN Kayley Mcclelland APRN        influenza 65up-adj (QUADRIVALENT ADJUVANTED PF) vaccine 0.5 mL  0.5 mL Intramuscular vaccine x 1 dose Amanda Guzman MD        insulin aspart U-100 pen 0-5 Units  0-5 Units Subcutaneous QID (AC + HS) PRN Kayley Mcclelland APRN        [START ON 3/8/2024] isosorbide mononitrate 24 hr tablet 30 mg  30 mg Oral Daily Kayley Mcclelland APRN        melatonin tablet 6 mg  6 mg Oral Nightly PRN Bipin Reno MD        [START ON 3/8/2024] metoprolol succinate (TOPROL-XL) 24 hr tablet 50 mg  50 mg Oral Daily Kayley Mcclelland APRN        ondansetron injection 4 mg  4 mg Intravenous Q8H PRN Bipin Reno MD        [START ON 3/8/2024] potassium chloride SA CR tablet 20 mEq  20 mEq Oral Daily Kayley Mcclelland APRN        sodium chloride 0.9% flush 10 mL  10 mL Intravenous PRN Bipin Reno MD           Allergies:  Review of patient's allergies indicates:   Allergen Reactions    Penicillins Rash    Sulfa (sulfonamide antibiotics) Rash        Labs: BMP:   Recent Labs   Lab 03/07/24  1331   GLU 89      K 3.6      CO2 30*   BUN 13   CREATININE 1.5*   CALCIUM 10.1    and CMP   Recent Labs   Lab 03/07/24  1331      K 3.6      CO2 30*   GLU 89   BUN 13   CREATININE 1.5*   CALCIUM 10.1   PROT 7.8   ALBUMIN 3.6   BILITOT 1.1*   ALKPHOS 143*   AST 15   ALT 13   ANIONGAP 3       Vital Signs (Most Recent):  Temp: 97.4 °F (36.3 °C) (03/07/24 1552)  Pulse: 83 (03/07/24 1738)  Resp: 18 (03/07/24 1738)  BP: 132/83 (03/07/24 1552)  SpO2: 97 % (03/07/24 1738) Vital Signs (24h Range):  Temp:  [97.4 °F (36.3  °C)-98 °F (36.7 °C)] 97.4 °F (36.3 °C)  Pulse:  [78-86] 83  Resp:  [18-21] 18  SpO2:  [93 %-97 %] 97 %  BP: (130-138)/(83-89) 132/83     Weight: 96.3 kg (212 lb 6.4 oz)  Body mass index is 33.27 kg/m².    SpO2: 97 %       No intake or output data in the 24 hours ending 03/07/24 1808      Assessment and Plan:   Clinical Impression/Plan of Care:  1.  Congestive Heart Failure:  Patient with continued lower extremity edema.    She has had minimal interval improvement since increasing the Lasix to 40 mg twice daily and instituting Jardiance therapy.  EF is reduced to 30 to 35% and she has severe tricuspid regurgitation.   Small hemodynamically insignificant pericardial effusion.   Recommend admission for inpatient management of CHF.   Lasix IV   Nitrates   Beta-blocker therapy.   Spironolactone   Jardiance/Farxiga    2.  Pulmonary Hypertension: No evidence of hypoxemia.   Continue to monitor.   Optimize CHF.    3.  Valvular insufficiency: The patient has moderate to severe tricuspid regurgitation secondary to pulmonary hypertension.   Continue with Isosorbide Mononitrate 30 mg daily.  Diuretic therapy.  Beta-blocker therapy    4.  Persistent Atrial Fibrillation:  during her last visit, she was in sinus rhythm.  She is now back in atrial fibrillation.  Continue to monitor.  Eliquis therapy 5 mg twice daily  Instituted Pacerone therapy at maintenance dose.    5.  Coronary Artery Disease: The cardiac CTA reveals 70% LAD stenosis.  The smaller vessels are not well visualized.   (Nuclear SPECT imaging was negative for ischemia.)   I recommend the patient continue with Aspirin 81 mg daily, Isosorbide Mononitrate, and beta-blocker therapy as she remains asymptomatic.      Continue with conservative management.    6.  Hypertension: Blood pressure today is controlled.     Continue current therapy.    7. Hyperlipidemia: Continue with Lovastatin therapy.   Lipids are excellently controlled.    Continue with  statin therapy.    8.  Diabetes Mellitus: Will defer to her primary care physician.     Diabetes is well controlled.    9. Obesity Counseling BMI: 33.89: The patient has been counseled on the importance of weight loss to reduce chronic morbidity conditions which may lead to hypertension, diabetes, and cardiopulmonary disease. We spent approximately 15 minutes discussing cellular respiration and metabolic syndrome.

## 2024-03-08 NOTE — ASSESSMENT & PLAN NOTE
Patient with Paroxysmal (<7 days) atrial fibrillation which is controlled currently with Beta Blocker. Patient is currently in atrial fibrillation.[id number] Score: 4. Anticoagulation indicated. Anticoagulation done with Eliquis 5 mg b.i.d. .

## 2024-03-08 NOTE — SUBJECTIVE & OBJECTIVE
Past Medical History:   Diagnosis Date    Arthritis     Asthma     Cataract     BILATERAL    Diabetes mellitus, type 2     H/O: Bell's palsy     Hypertension     Wears dentures     FULL       Past Surgical History:   Procedure Laterality Date    HYSTERECTOMY      OOPHORECTOMY      right knee surgery         Review of patient's allergies indicates:   Allergen Reactions    Penicillins Rash    Sulfa (sulfonamide antibiotics) Rash       No current facility-administered medications on file prior to encounter.     Current Outpatient Medications on File Prior to Encounter   Medication Sig    amlodipine (NORVASC) 10 MG tablet Take 1 tablet (10 mg total) by mouth once daily.    aspirin (ECOTRIN) 81 MG EC tablet Take 81 mg by mouth once daily.    carvedilol (COREG) 12.5 MG tablet Take 1 tablet (12.5 mg total) by mouth once daily.    ELIQUIS 5 mg Tab Take 5 mg by mouth 2 (two) times daily.    empagliflozin (JARDIANCE) 10 mg tablet Take 10 mg by mouth once daily.    furosemide (LASIX) 20 MG tablet Take 20 mg by mouth.    furosemide (LASIX) 20 MG tablet Take 1 tablet (20 mg total) by mouth every 8 (eight) hours.    isosorbide mononitrate (IMDUR) 30 MG 24 hr tablet Take 30 mg by mouth.    lisinopril-hydrochlorothiazide (PRINZIDE,ZESTORETIC) 20-25 mg Tab Take 1 tablet by mouth once daily.    lovastatin (MEVACOR) 20 MG tablet Take 1 tablet (20 mg total) by mouth every evening.    metformin (GLUCOPHAGE) 500 MG tablet Take 2 tablets (1,000 mg total) by mouth daily with breakfast.    metoprolol succinate (TOPROL-XL) 50 MG 24 hr tablet Take 50 mg by mouth.    niacin 250 MG Tab Take 250 mg by mouth nightly.    ondansetron (ZOFRAN) 4 MG tablet Take 1 tablet (4 mg total) by mouth every 6 (six) hours.    oxybutynin (DITROPAN) 5 MG Tab Take 1 tablet (5 mg total) by mouth 2 (two) times daily.    vitamin D (VITAMIN D3) 1000 units Tab Take 185 mg by mouth once daily.     Family History       Problem Relation (Age of Onset)    Brain Hemorrhage  Mother (33)    Heart disease Brother, Brother, Brother    No Known Problems Father, Sister, Maternal Aunt, Maternal Uncle, Paternal Aunt, Paternal Uncle, Maternal Grandmother, Maternal Grandfather, Paternal Grandmother, Paternal Grandfather    Ovarian cancer Daughter          Tobacco Use    Smoking status: Never    Smokeless tobacco: Never   Substance and Sexual Activity    Alcohol use: No    Drug use: No    Sexual activity: Not on file     Comment:      Review of Systems   Constitutional:  Positive for activity change and fatigue. Negative for appetite change, chills and fever.   HENT:  Negative for ear pain, mouth sores, nosebleeds and sore throat.    Eyes:  Negative for visual disturbance.   Respiratory:  Positive for cough, shortness of breath and wheezing.    Cardiovascular:  Positive for leg swelling. Negative for chest pain and palpitations.   Gastrointestinal:  Negative for abdominal distention, abdominal pain, blood in stool, constipation, diarrhea, nausea and vomiting.   Endocrine: Negative for polyphagia.   Genitourinary:  Negative for difficulty urinating, dysuria, flank pain and frequency.   Musculoskeletal:  Positive for arthralgias. Negative for back pain and myalgias.   Skin:  Negative for rash.   Neurological:  Positive for weakness. Negative for dizziness, tremors, seizures, syncope, facial asymmetry, speech difficulty and headaches.   Hematological:  Negative for adenopathy.   Psychiatric/Behavioral:  Negative for agitation, confusion and hallucinations. The patient is not nervous/anxious.      Objective:     Vital Signs (Most Recent):  Temp: 98 °F (36.7 °C) (03/08/24 0734)  Pulse: 93 (03/08/24 0800)  Resp: 18 (03/08/24 0738)  BP: (!) 142/81 (03/08/24 0734)  SpO2: (!) 94 % (03/08/24 0738) Vital Signs (24h Range):  Temp:  [97.4 °F (36.3 °C)-98 °F (36.7 °C)] 98 °F (36.7 °C)  Pulse:  [74-95] 93  Resp:  [17-24] 18  SpO2:  [93 %-97 %] 94 %  BP: (116-142)/(71-89) 142/81     Weight: 96.3 kg (212 lb  6.4 oz)  Body mass index is 33.27 kg/m².     Physical Exam  Constitutional:       General: She is not in acute distress.     Appearance: She is ill-appearing (Chronically ill-appearing). She is not toxic-appearing.   HENT:      Head: Normocephalic and atraumatic.      Nose: No congestion or rhinorrhea.      Mouth/Throat:      Pharynx: No oropharyngeal exudate.      Comments: Dentures  Eyes:      General: No scleral icterus.  Cardiovascular:      Rate and Rhythm: Normal rate and regular rhythm.      Pulses: Normal pulses.      Heart sounds: Normal heart sounds. No murmur heard.     No friction rub. No gallop.   Pulmonary:      Effort: No respiratory distress.      Breath sounds: No stridor. Rales present. No wheezing or rhonchi.      Comments: On supplemental oxygen via nasal cannula.  Chest:      Chest wall: No tenderness.   Abdominal:      General: There is no distension.      Palpations: There is no mass.      Tenderness: There is no abdominal tenderness. There is no right CVA tenderness, left CVA tenderness, guarding or rebound.      Hernia: No hernia is present.   Musculoskeletal:         General: No swelling, tenderness or deformity.      Cervical back: Normal range of motion and neck supple. No rigidity or tenderness.      Right lower leg: Edema (+1) present.      Left lower leg: Edema (+1) present.   Lymphadenopathy:      Cervical: No cervical adenopathy.   Skin:     General: Skin is warm and dry.      Capillary Refill: Capillary refill takes less than 2 seconds.      Coloration: Skin is not jaundiced or pale.      Findings: No rash.   Neurological:      Mental Status: She is alert and oriented to person, place, and time.      Motor: Weakness (Generalized) present.   Psychiatric:         Mood and Affect: Mood normal.         Behavior: Behavior normal.                Significant Labs: All pertinent labs within the past 24 hours have been reviewed.  CBC:   Recent Labs   Lab 03/07/24  1331 03/08/24  0536   WBC  6.69 6.72   HGB 14.1 12.7   HCT 40.8 37.2    205       CMP:   Recent Labs   Lab 03/07/24  1331 03/08/24  0536    141   K 3.6 3.2*    105   CO2 30* 30*   GLU 89 90   BUN 13 13   CREATININE 1.5* 1.3   CALCIUM 10.1 9.8   PROT 7.8 7.4   ALBUMIN 3.6 3.3*   BILITOT 1.1* 1.0   ALKPHOS 143* 135   AST 15 13   ALT 13 13   ANIONGAP 3 6         Significant Imaging: I have reviewed all pertinent imaging results/findings within the past 24 hours.

## 2024-03-08 NOTE — PT/OT/SLP EVAL
Physical Therapy Evaluation     Patient Name: Edyta Gomez   MRN: [id number]  Recent Surgery: * No surgery found *      Recommendations:     Discharge Recommendations: Low Intensity Therapy   Discharge Equipment Recommendations: walker, rolling   Barriers to discharge: None    Assessment:     Edyta Gomez is a 79 y.o. female admitted with a medical diagnosis of Heart failure with reduced ejection fraction. She presents with the following impairments/functional limitations: weakness, impaired balance, impaired joint extensibility, impaired endurance, impaired functional mobility, impaired muscle length, gait instability, decreased lower extremity function, impaired cardiopulmonary response to activity. Patient reports that she was able to get around and walk without any assistive device prior to this hospital admission.  informed this DPT that patient's daughter is interested in a rollator.  Patient and  both informed that the rollator is not appropriate for the patient at this time due to balance and coordination issues.  She will be safer with a regular 2-wheeled walker for stability. Patient expressed understanding.  At the time of evaluation, patient denies pain, she required SBA with supine <> sit with use of side rail, CGA with sit <> stand with definite use of both UE, ambulated ~93 feet with RW and 2 liters of O2 via nasal cannula, SPO2 was 92-94% and HR of 96 bpm post ambulation.  P.T. will work with patient to increase functional endurance and balance while she is in the hospital, recommend a 2-wheeled RW and home health P.T. upon discharge from the hospital.     Rehab Prognosis: Fair; patient would benefit from acute PT services to address these deficits and reach maximum level of function.    Plan:     During this hospitalization, patient to be seen 5 x/week to address the above listed problems via gait training, therapeutic activities, therapeutic exercises, neuromuscular  "re-education    Plan of Care Expires: 03/15/24    Subjective     Chief Complaint: "I am cold."   Patient Comments/Goals: Get stronger.   Pain/Comfort:  Pain Rating 1: 0/10  Pain Rating Post-Intervention 1: 0/10    Social History:  Living Environment: Patient lives with their grandchildren and great grandchildren   in a single story home with can live on 1st floor  Prior Level of Function: Prior to admission, patient was modified independent  Equipment Used at Home: oxygen, walker, standard, cane, quad, nebulizer, bedside commode  DME owned (not currently used): none  Assistance Upon Discharge: family    Objective:     Communicated with nurse and patient  prior to session. Patient found HOB elevated with peripheral IV, PureWick, oxygen upon PT entry to room.    General Precautions: Standard, fall, respiratory   Orthopedic Precautions: N/A   Braces: N/A    Respiratory Status: Nasal cannula, flow 2 L/min    Exams:  Cognition: Patient is oriented to Person, Place, Time, Situation  RLE ROM: WFL  RLE Strength: WFL  LLE ROM: WFL  LLE Strength: WFL  Gross Motor Coordination: WFL  Postural Exam: Patient presented with the following abnormalities:    -       Rounded shoulders  -       Forward head  Sensation:    -       Intact  Skin Integrity/Edema:     -       chronic swelling on the right knee, history of knee surgery     Functional Mobility:  Gait belt applied - Yes  Bed Mobility  Rolling Left: stand by assistance  Rolling Right: stand by assistance  Scooting: stand by assistance  Supine to Sit: stand by assistance for LE management and trunk management  Sit to Supine: stand by assistance for LE management and trunk management  Transfers  Sit to Stand: contact guard assistance with rolling walker and with cues for hand placement  Gait  Patient ambulated ~93 feet  with rolling walker and contact guard assistance. Patient demonstrates steady gait and decreased step length. .. portable Supplemental O2 2L " utilized.  Balance  Sitting: modified independence  Standing: contact guard assistance      Therapeutic Activities and Exercises:   Patient educated on role of acute care PT and PT POC, safety while in hospital including calling nurse for mobility, and call light usage  Patient educated about importance of OOB mobility and remaining up in chair most of the day.  Activity pacing     AM-PAC 6 CLICK MOBILITY  Total Score:18    Patient left HOB elevated with all lines intact, call button in reach, RN notified, and bed alarm on.    GOALS:   Multidisciplinary Problems       Physical Therapy Goals          Problem: Physical Therapy    Goal Priority Disciplines Outcome Goal Variances Interventions   Physical Therapy Goal     PT, PT/OT Ongoing, Progressing     Description: Goals to be met by: 3/15/2024     Patient will increase functional independence with mobility by performin. Supine to sit with Modified Independent.  2. Sit to supine with Modified Independent.  3. Bed to chair transfer with Supervision or Set-up Assistance with rolling walker using Step Transfer technique.  4. Sit to Stand with Supervision or Set-up Assistance with rolling walker.  5. Gait  x 150  feet with Supervision or Set-up Assistance with RW and O2 supplement..  6. Lower extremity exercise program x10 reps.                           History:     Past Medical History:   Diagnosis Date    Arthritis     Asthma     Cataract     BILATERAL    Diabetes mellitus, type 2     H/O: Bell's palsy     Hypertension     Wears dentures     FULL       Past Surgical History:   Procedure Laterality Date    HYSTERECTOMY      OOPHORECTOMY      right knee surgery         Time Tracking:     PT Received On: 24  PT Start Time: 1430  PT Stop Time: 1500  PT Total Time (min): 30 min     Billable Minutes: Evaluation low complexity     3/8/2024

## 2024-03-08 NOTE — PLAN OF CARE
Problem: Physical Therapy  Goal: Physical Therapy Goal  Description: Goals to be met by: 3/15/2024     Patient will increase functional independence with mobility by performin. Supine to sit with Modified Independent.  2. Sit to supine with Modified Independent.  3. Bed to chair transfer with Supervision or Set-up Assistance with rolling walker using Step Transfer technique.  4. Sit to Stand with Supervision or Set-up Assistance with rolling walker.  5. Gait  x 150  feet with Supervision or Set-up Assistance with RW and O2 supplement..  6. Lower extremity exercise program x10 reps.      Outcome: Plan of care established

## 2024-03-08 NOTE — PROGRESS NOTES
[facility] Medicine  Progress Note    Patient Name: Edyta Gomez  MRN: [id number]  Patient Class: OP- Observation   Admission Date: 3/7/2024  Length of Stay: 0 days  Attending Physician: Amanda Guzman MD  Primary Care Provider: Amanda Guzman MD        Subjective:     Principal Problem:Heart failure with reduced ejection fraction        HPI:  ED HPI: 79-year-old female history of asthma, diabetes, hypertension, CHF presents to ED sent by cardiologist for CHF exacerbation.  Patient reports orthopnea and dyspnea on exertion worsening in nature for the past several days.  Has been taking her Lasix 20 mg twice a day.  Denies any chest pain.  Also endorses bilateral lower extremity swelling.     IM HPI:  79-year-old  female with a past medical history of arthritis, asthma, cataracts, type 2 diabetes, hypertension, CHF presented to the emergency department today from Dr. Real's office for acute CHF exacerbation.  Patient noted with an EF of 30 as well as rhythm change on EKG.  Patient was supposed to establish care in the clinic with me this afternoon, and had a follow up with Dr. Real this morning from her emergency department visit.  Patient reports she was previously established with ENRIKE Guaman at Dr. Muna Boswell's office, but reports she was not aware the clinic closed and when she attempted to call to make an appointment, there was no answer.  Patient reports she was evaluated in the emergency department on 02/23/2024 for shortness of breath as well as lower extremity edema.  At that time patient's chest x-ray was clear other than cardiomegaly, Dr. Real was consulted, and he recommended IV Lasix in the emergency department as well as increasing her Lasix in an outpatient setting and follow up this week.  Patient reports she has home oxygen that has been set up for quite some time, but she has not had to use it, but does endorse using it this week due to  worsening dyspnea at rest as well as exertional dyspnea.  Patient reports 3 pillow orthopnea.  Patient denies any chest pain.  Chest x-ray in the emergency department demonstrates cardiomegaly and right basilar infiltrate or atelectasis and right pleural effusion.  Cannot exclude adenopathy on the right consider CT of the chest with IV contrast.  Patient's creatinine at this time is 1.5, we will need to monitor to determine if patient can tolerate IV contrast.  BNP 2018 which is increased from 1483 from previous emergency department visit.  POCT COVID/flu negative.  Patient given IV Lasix injection in the emergency department, is maintaining oxygen saturation on room air.  Patient admitted for further diuresis and monitoring.  We will start patient on continuous IV Lasix infusion.  Consulting Dr. Real to follow patient during hospitalization.    Overview/Hospital Course:  3/8 GT:  Patient is doing well this morning.  Patient noted with a mild hypokalemia due to continuous Lasix infusion, we will increase potassium to 20 mEq b.i.d. and continue to monitor with daily labs.  Patient is diuresing well, edema to bilateral lower extremities significantly improved from yesterday.  Repeat chest x-ray this morning demonstrates some improvement in pulmonary edema, awaiting official radiology read.  Patient reports she is still having some shortness of breath, but reports it is improving.  Patient is on continuous supplemental oxygen.  We will add DuoNebs to patient's medication regimen.    Past Medical History:   Diagnosis Date    Arthritis     Asthma     Cataract     BILATERAL    Diabetes mellitus, type 2     H/O: Bell's palsy     Hypertension     Wears dentures     FULL       Past Surgical History:   Procedure Laterality Date    HYSTERECTOMY      OOPHORECTOMY      right knee surgery         Review of patient's allergies indicates:   Allergen Reactions    Penicillins Rash    Sulfa (sulfonamide antibiotics) Rash       No  current facility-administered medications on file prior to encounter.     Current Outpatient Medications on File Prior to Encounter   Medication Sig    amlodipine (NORVASC) 10 MG tablet Take 1 tablet (10 mg total) by mouth once daily.    aspirin (ECOTRIN) 81 MG EC tablet Take 81 mg by mouth once daily.    carvedilol (COREG) 12.5 MG tablet Take 1 tablet (12.5 mg total) by mouth once daily.    ELIQUIS 5 mg Tab Take 5 mg by mouth 2 (two) times daily.    empagliflozin (JARDIANCE) 10 mg tablet Take 10 mg by mouth once daily.    furosemide (LASIX) 20 MG tablet Take 20 mg by mouth.    furosemide (LASIX) 20 MG tablet Take 1 tablet (20 mg total) by mouth every 8 (eight) hours.    isosorbide mononitrate (IMDUR) 30 MG 24 hr tablet Take 30 mg by mouth.    lisinopril-hydrochlorothiazide (PRINZIDE,ZESTORETIC) 20-25 mg Tab Take 1 tablet by mouth once daily.    lovastatin (MEVACOR) 20 MG tablet Take 1 tablet (20 mg total) by mouth every evening.    metformin (GLUCOPHAGE) 500 MG tablet Take 2 tablets (1,000 mg total) by mouth daily with breakfast.    metoprolol succinate (TOPROL-XL) 50 MG 24 hr tablet Take 50 mg by mouth.    niacin 250 MG Tab Take 250 mg by mouth nightly.    ondansetron (ZOFRAN) 4 MG tablet Take 1 tablet (4 mg total) by mouth every 6 (six) hours.    oxybutynin (DITROPAN) 5 MG Tab Take 1 tablet (5 mg total) by mouth 2 (two) times daily.    vitamin D (VITAMIN D3) 1000 units Tab Take 185 mg by mouth once daily.     Family History       Problem Relation (Age of Onset)    Brain Hemorrhage Mother (33)    Heart disease Brother, Brother, Brother    No Known Problems Father, Sister, Maternal Aunt, Maternal Uncle, Paternal Aunt, Paternal Uncle, Maternal Grandmother, Maternal Grandfather, Paternal Grandmother, Paternal Grandfather    Ovarian cancer Daughter          Tobacco Use    Smoking status: Never    Smokeless tobacco: Never   Substance and Sexual Activity    Alcohol use: No    Drug use: No    Sexual activity: Not on file      Comment:      Review of Systems   Constitutional:  Positive for activity change and fatigue. Negative for appetite change, chills and fever.   HENT:  Negative for ear pain, mouth sores, nosebleeds and sore throat.    Eyes:  Negative for visual disturbance.   Respiratory:  Positive for cough, shortness of breath and wheezing.    Cardiovascular:  Positive for leg swelling. Negative for chest pain and palpitations.   Gastrointestinal:  Negative for abdominal distention, abdominal pain, blood in stool, constipation, diarrhea, nausea and vomiting.   Endocrine: Negative for polyphagia.   Genitourinary:  Negative for difficulty urinating, dysuria, flank pain and frequency.   Musculoskeletal:  Positive for arthralgias. Negative for back pain and myalgias.   Skin:  Negative for rash.   Neurological:  Positive for weakness. Negative for dizziness, tremors, seizures, syncope, facial asymmetry, speech difficulty and headaches.   Hematological:  Negative for adenopathy.   Psychiatric/Behavioral:  Negative for agitation, confusion and hallucinations. The patient is not nervous/anxious.      Objective:     Vital Signs (Most Recent):  Temp: 98 °F (36.7 °C) (03/08/24 0734)  Pulse: 93 (03/08/24 0800)  Resp: 18 (03/08/24 0738)  BP: (!) 142/81 (03/08/24 0734)  SpO2: (!) 94 % (03/08/24 0738) Vital Signs (24h Range):  Temp:  [97.4 °F (36.3 °C)-98 °F (36.7 °C)] 98 °F (36.7 °C)  Pulse:  [74-95] 93  Resp:  [17-24] 18  SpO2:  [93 %-97 %] 94 %  BP: (116-142)/(71-89) 142/81     Weight: 96.3 kg (212 lb 6.4 oz)  Body mass index is 33.27 kg/m².     Physical Exam  Constitutional:       General: She is not in acute distress.     Appearance: She is ill-appearing (Chronically ill-appearing). She is not toxic-appearing.   HENT:      Head: Normocephalic and atraumatic.      Nose: No congestion or rhinorrhea.      Mouth/Throat:      Pharynx: No oropharyngeal exudate.      Comments: Dentures  Eyes:      General: No scleral  icterus.  Cardiovascular:      Rate and Rhythm: Normal rate and regular rhythm.      Pulses: Normal pulses.      Heart sounds: Normal heart sounds. No murmur heard.     No friction rub. No gallop.   Pulmonary:      Effort: No respiratory distress.      Breath sounds: No stridor. Rales present. No wheezing or rhonchi.      Comments: On supplemental oxygen via nasal cannula.  Chest:      Chest wall: No tenderness.   Abdominal:      General: There is no distension.      Palpations: There is no mass.      Tenderness: There is no abdominal tenderness. There is no right CVA tenderness, left CVA tenderness, guarding or rebound.      Hernia: No hernia is present.   Musculoskeletal:         General: No swelling, tenderness or deformity.      Cervical back: Normal range of motion and neck supple. No rigidity or tenderness.      Right lower leg: Edema (+1) present.      Left lower leg: Edema (+1) present.   Lymphadenopathy:      Cervical: No cervical adenopathy.   Skin:     General: Skin is warm and dry.      Capillary Refill: Capillary refill takes less than 2 seconds.      Coloration: Skin is not jaundiced or pale.      Findings: No rash.   Neurological:      Mental Status: She is alert and oriented to person, place, and time.      Motor: Weakness (Generalized) present.   Psychiatric:         Mood and Affect: Mood normal.         Behavior: Behavior normal.                Significant Labs: All pertinent labs within the past 24 hours have been reviewed.  CBC:   Recent Labs   Lab 03/07/24  1331 03/08/24  0536   WBC 6.69 6.72   HGB 14.1 12.7   HCT 40.8 37.2    205       CMP:   Recent Labs   Lab 03/07/24  1331 03/08/24  0536    141   K 3.6 3.2*    105   CO2 30* 30*   GLU 89 90   BUN 13 13   CREATININE 1.5* 1.3   CALCIUM 10.1 9.8   PROT 7.8 7.4   ALBUMIN 3.6 3.3*   BILITOT 1.1* 1.0   ALKPHOS 143* 135   AST 15 13   ALT 13 13   ANIONGAP 3 6         Significant Imaging: I have reviewed all pertinent imaging  results/findings within the past 24 hours.    Assessment/Plan:      * Heart failure with reduced ejection fraction  Patient noted with HFrEF (35%) per report from Dr. Real.  Patient admitted for diuresis, close monitoring.  Starting patient on continuous Lasix infusion.  Resuming Jardiance.  Strict I&Os, 1500 mL fluid restriction.  Daily weights.    3/8 GT:  Patient is diuresing well.  Pharmacy did not have Jardiance so patient was transitioned to Farxiga.  Continue with fluid restriction, strict I&Os, Lasix infusion.  Dr. Real is following.    Atrial fibrillation  Patient with Paroxysmal (<7 days) atrial fibrillation which is controlled currently with Beta Blocker. Patient is currently in atrial fibrillation.[id number] Score: 4. Anticoagulation indicated. Anticoagulation done with Eliquis 5 mg b.i.d. .    Type 2 diabetes mellitus, without long-term current use of insulin  Obtaining hemoglobin A1c today.  Sliding scale insulin.    3/8 GT:  Hemoglobin A1c 5.5.    Hypertension  Chronic, controlled. Latest blood pressure and vitals reviewed-     Temp:  [97.4 °F (36.3 °C)-98 °F (36.7 °C)]   Pulse:  [74-95]   Resp:  [17-24]   BP: (116-142)/(71-89)   SpO2:  [93 %-97 %] .   Home meds for hypertension were reviewed and noted below.   Hypertension Medications               amlodipine (NORVASC) 10 MG tablet Take 1 tablet (10 mg total) by mouth once daily.    carvedilol (COREG) 12.5 MG tablet Take 1 tablet (12.5 mg total) by mouth once daily.    furosemide (LASIX) 20 MG tablet Take 20 mg by mouth.    furosemide (LASIX) 20 MG tablet Take 1 tablet (20 mg total) by mouth every 8 (eight) hours.    isosorbide mononitrate (IMDUR) 30 MG 24 hr tablet Take 30 mg by mouth.    lisinopril-hydrochlorothiazide (PRINZIDE,ZESTORETIC) 20-25 mg Tab Take 1 tablet by mouth once daily.    metoprolol succinate (TOPROL-XL) 50 MG 24 hr tablet Take 50 mg by mouth.            Resuming metoprolol and starting patient on IV Lasix  infusion    Hyperlipidemia  Atorvastatin 40 mg daily.        VTE Risk Mitigation (From admission, onward)           Ordered     apixaban tablet 5 mg  2 times daily         03/07/24 1622     IP VTE HIGH RISK PATIENT  Once         03/07/24 1441     Place sequential compression device  Until discontinued         03/07/24 1441                    Discharge Planning   ESTELLE:      Code Status: Full Code   Is the patient medically ready for discharge?:     Reason for patient still in hospital (select all that apply): Patient trending condition, Treatment, Consult recommendations, and Pending disposition                     JANET TA  Department of Hospital Medicine   [facility]

## 2024-03-08 NOTE — PLAN OF CARE
[facility]  Initial Discharge Assessment       Primary Care Provider: Amanda Guzman MD    Admission Diagnosis: Shortness of breath [R06.02]  Congestive heart failure, unspecified HF chronicity, unspecified heart failure type [I50.9]    Admission Date: 3/7/2024  Expected Discharge Date:          Payor: [employer]ET[employer] MANAGED MEDICARE / Plan: AET[employer] MEDICARE PLAN PPO / Product Type: Medicare Advantage /     Extended Emergency Contact Information  Primary Emergency Contact: MARCELLUS RODRIGUEZ  Mobile Phone: [phone]  Relation: Daughter  Preferred language: English   needed? No  Secondary Emergency Contact: Annie Ontiveros  Mobile Phone: [phone]  Relation: Grandchild    Discharge Plan A: Home with family, Home Health  Discharge Plan B: Home with family, Home Health      [facility] [address] [place] [address] [address] [facility] [address]  [address] [facility] [address]  [facility] [address]  Phone: [phone] Fax: [phone]      Initial Assessment (most recent)       Adult Discharge Assessment - 03/08/24 1235          Discharge Assessment    Assessment Type Discharge Planning Assessment     Confirmed/corrected address, phone number and insurance Yes     Confirmed Demographics Correct on Facesheet     Source of Information patient;family     Reason For Admission Heart failure with reduced ejection fraction     People in Home grandchild(sammie)   The patient lives with her granddaughter and her great grandchildren.    Do you expect to return to your current living situation? Yes     Prior to hospitilization cognitive status: Alert/Oriented     Current cognitive status: Alert/Oriented     Walking or Climbing Stairs Difficulty yes     Walking or Climbing Stairs ambulation difficulty, requires equipment     Mobility Management standard walker and quad cane     Dressing/Bathing Difficulty no     Home Layout Able to live on 1st floor     Equipment Currently Used at Home oxygen;nebulizer;walker, standard;cane, quad;other  (see comments);bedside commode   The patient currently is borrowing her great beatriz's nebulizer machine. The patient also has a bedside commode if needed.    Readmission within 30 days? No     Patient currently being followed by outpatient case management? No     Do you currently have service(s) that help you manage your care at home? No     Do you take prescription medications? Yes     Do you have prescription coverage? Yes     Coverage AETNA MANAGED MEDICARE - AET[employer] MEDICARE PLAN PPO     Discharge Plan A Home with family;Home Health     Discharge Plan B Home with family;Home Health     DME Needed Upon Discharge  other (see comments)   TBD    Discharge Plan discussed with: Patient        Physical Activity    On average, how many days per week do you engage in moderate to strenuous exercise (like a brisk walk)? 0 days     On average, how many minutes do you engage in exercise at this level? 0 min        Financial Resource Strain    How hard is it for you to pay for the very basics like food, housing, medical care, and heating? Not hard at all        Housing Stability    In the last 12 months, was there a time when you were not able to pay the mortgage or rent on time? No     In the last 12 months, how many places have you lived? 1     In the last 12 months, was there a time when you did not have a steady place to sleep or slept in a shelter (including now)? No        Transportation Needs    In the past 12 months, has lack of transportation kept you from medical appointments or from getting medications? No     In the past 12 months, has lack of transportation kept you from meetings, work, or from getting things needed for daily living? No        Food Insecurity    Within the past 12 months, you worried that your food would run out before you got the money to buy more. Never true     Within the past 12 months, the food you bought just didn't last and you didn't have money to get more. Never true        Stress     Do you feel stress - tense, restless, nervous, or anxious, or unable to sleep at night because your mind is troubled all the time - these days? To some extent        Social Connections    In a typical week, how many times do you talk on the phone with family, friends, or neighbors? More than three times a week     How often do you get together with friends or relatives? More than three times a week     How often do you attend [employer] or [employer] services? More than 4 times per year     Are you , , , , never , or living with a partner?         Alcohol Use    Q1: How often do you have a drink containing alcohol? Never     Q2: How many drinks containing alcohol do you have on a typical day when you are drinking? Patient does not drink     Q3: How often do you have six or more drinks on one occasion? Never                 Initial discharge assessment is completed. Spoke to the patient in her room. The patient's granddaughter, Annie, was present at her bedside. The patient lives with her granddaughter and great grandchildren. She plans to return home upon discharge. The patient does require equipment with her ADLs. She has a standard walker and quad cane.     The patient is open to home health services upon discharge. NP was notified. The patient does present with a few social determinants of health. I asked the patient if she would be open to community resources, but she declined. Contact information was left in the patient's room. CM/SW will remain available.

## 2024-03-08 NOTE — ASSESSMENT & PLAN NOTE
Patient noted with HFrEF (35%) per report from Dr. Real.  Patient admitted for diuresis, close monitoring.  Starting patient on continuous Lasix infusion.  Resuming Jardiance.  Strict I&Os, 1500 mL fluid restriction.  Daily weights.    3/8 GT:  Patient is diuresing well.  Pharmacy did not have Jardiance so patient was transitioned to Farxiga.  Continue with fluid restriction, strict I&Os, Lasix infusion.  Dr. Real is following.

## 2024-03-08 NOTE — PROGRESS NOTES
STEPHANIE Real MD  [address] [place] [address],  [address]  [place] [address]  Phone:  [phone]  Fax:  [phone]  Email: [email or web address]  _______________________________________________________________      Today's Date:  3/8/2024  Patient Name: Edyta Gomez    MRN: [id number]    Code Status: Full Code     Attending Physician: Amanda Guzman MD   Consulting Physician: STEPHANIE Real MD    Hospital Course:  Feeling better clinically.    States that lower extremity edema is resolving.  No chest pain, but still dyspneic.    Tolerating therapy well.      Vital Signs (Most Recent):  Temp: 98.1 °F (36.7 °C) (03/08/24 1107)  Pulse: 109 (03/08/24 1114)  Resp: 18 (03/08/24 1107)  BP: 121/77 (03/08/24 1107)  SpO2: 95 % (03/08/24 1107) Vital Signs (24h Range):  Temp:  [97.4 °F (36.3 °C)-98.1 °F (36.7 °C)] 98.1 °F (36.7 °C)  Pulse:  [] 109  Resp:  [17-24] 18  SpO2:  [93 %-97 %] 95 %  BP: (116-142)/(71-89) 121/77     Weight: 96.3 kg (212 lb 6.4 oz)  Body mass index is 33.27 kg/m².    SpO2: 95 %         Intake/Output Summary (Last 24 hours) at 3/8/2024 1440  Last data filed at 3/8/2024 0600  Gross per 24 hour   Intake 388.44 ml   Output 2800 ml   Net -2411.56 ml     Labs:  CMP   Recent Labs   Lab 03/07/24  1331 03/08/24  0536    141   K 3.6 3.2*    105   CO2 30* 30*   GLU 89 90   BUN 13 13   CREATININE 1.5* 1.3   CALCIUM 10.1 9.8   PROT 7.8 7.4   ALBUMIN 3.6 3.3*   BILITOT 1.1* 1.0   ALKPHOS 143* 135   AST 15 13   ALT 13 13   ANIONGAP 3 6    and CBC   Recent Labs   Lab 03/07/24  1331 03/08/24  0536   WBC 6.69 6.72   HGB 14.1 12.7   HCT 40.8 37.2    205       Physical Examination:  General:   Alert and oriented.  No distress  Heent:  JVD of 5 cm.  Lungs:   Diminished breath sounds.  No wheezing.  Heart:   Irregular regular rhythm; rate within normal limits.  Extremities:   1-2+ bilateral edema.  Assessment and Plan:     1.  Congestive Heart Failure:   Secondary to reduced  ejection fraction.  There has been an interval decline in LV function.  In addition, she has moderate to severe tricuspid regurgitation.  She is on IV diuretic therapy to which she was responded well with a net urinary output of -2400 cc.   She has become hypokalemic for which potassium has been increased.  In addition, she is on spironolactone therapy.  Continue with IV Lasix   (Recommend reducing Lasix tomorrow.  Agree with increase potassium supplementation.    2.  Pulmonary Hypertension: No evidence of hypoxemia.   Continue to monitor.   Optimize CHF.     3.  Valvular insufficiency: The patient has moderate to severe tricuspid regurgitation secondary to pulmonary hypertension.   Continue with Isosorbide Mononitrate 30 mg daily.  Diuretic therapy.  Beta-blocker therapy     4.  Persistent Atrial Fibrillation:  during her last visit, she was in sinus rhythm.  She is now back in atrial fibrillation.  Continue to monitor.  Eliquis therapy 5 mg twice daily  Instituted Pacerone therapy at maintenance dose.     5.  Coronary Artery Disease: The cardiac CTA reveals 70% LAD stenosis.  The smaller vessels are not well visualized.   (Nuclear SPECT imaging was negative for ischemia.)   I recommend the patient continue with Aspirin 81 mg daily, Isosorbide Mononitrate, and beta-blocker therapy as she remains asymptomatic.     Continue with conservative management.     6.  Hypertension: Blood pressure remains well-controlled.    Continue current therapy.     7. Hyperlipidemia: Continue with Lovastatin therapy.   Lipids are excellently controlled.    Continue with  statin therapy.     8. Diabetes Mellitus: Will defer to her primary care physician.     Diabetes is well controlled.

## 2024-03-08 NOTE — ASSESSMENT & PLAN NOTE
Chronic, controlled. Latest blood pressure and vitals reviewed-     Temp:  [97.4 °F (36.3 °C)-98 °F (36.7 °C)]   Pulse:  [74-95]   Resp:  [17-24]   BP: (116-142)/(71-89)   SpO2:  [93 %-97 %] .   Home meds for hypertension were reviewed and noted below.   Hypertension Medications               amlodipine (NORVASC) 10 MG tablet Take 1 tablet (10 mg total) by mouth once daily.    carvedilol (COREG) 12.5 MG tablet Take 1 tablet (12.5 mg total) by mouth once daily.    furosemide (LASIX) 20 MG tablet Take 20 mg by mouth.    furosemide (LASIX) 20 MG tablet Take 1 tablet (20 mg total) by mouth every 8 (eight) hours.    isosorbide mononitrate (IMDUR) 30 MG 24 hr tablet Take 30 mg by mouth.    lisinopril-hydrochlorothiazide (PRINZIDE,ZESTORETIC) 20-25 mg Tab Take 1 tablet by mouth once daily.    metoprolol succinate (TOPROL-XL) 50 MG 24 hr tablet Take 50 mg by mouth.            Resuming metoprolol and starting patient on IV Lasix infusion

## 2024-03-09 LAB
ALBUMIN SERPL BCP-MCNC: 3.1 G/DL (ref 3.5–5.2)
ALP SERPL-CCNC: 134 U/L (ref 55–135)
ALT SERPL W/O P-5'-P-CCNC: 12 U/L (ref 10–44)
ANION GAP SERPL CALC-SCNC: 1 MMOL/L (ref 3–11)
AST SERPL-CCNC: 14 U/L (ref 10–40)
BASOPHILS # BLD AUTO: 0.06 K/UL (ref 0–0.2)
BASOPHILS NFR BLD: 0.9 % (ref 0–1.9)
BILIRUB SERPL-MCNC: 0.8 MG/DL (ref 0.1–1)
BUN SERPL-MCNC: 11 MG/DL (ref 8–23)
CALCIUM SERPL-MCNC: 9.7 MG/DL (ref 8.7–10.5)
CHLORIDE SERPL-SCNC: 105 MMOL/L (ref 95–110)
CO2 SERPL-SCNC: 34 MMOL/L (ref 23–29)
CREAT SERPL-MCNC: 1.3 MG/DL (ref 0.5–1.4)
DIFFERENTIAL METHOD BLD: ABNORMAL
EOSINOPHIL # BLD AUTO: 0.4 K/UL (ref 0–0.5)
EOSINOPHIL NFR BLD: 5.5 % (ref 0–8)
ERYTHROCYTE [DISTWIDTH] IN BLOOD BY AUTOMATED COUNT: 17.2 % (ref 11.5–14.5)
EST. GFR  (NO RACE VARIABLE): 42.1 ML/MIN/1.73 M^2
GLUCOSE SERPL-MCNC: 90 MG/DL (ref 70–110)
HCT VFR BLD AUTO: 36.8 % (ref 37–48.5)
HGB BLD-MCNC: 12.4 G/DL (ref 12–16)
IMM GRANULOCYTES # BLD AUTO: 0.01 K/UL (ref 0–0.04)
IMM GRANULOCYTES NFR BLD AUTO: 0.1 % (ref 0–0.5)
LYMPHOCYTES # BLD AUTO: 1.7 K/UL (ref 1–4.8)
LYMPHOCYTES NFR BLD: 25.2 % (ref 18–48)
MAGNESIUM SERPL-MCNC: 2.3 MG/DL (ref 1.6–2.6)
MCH RBC QN AUTO: 30 PG (ref 27–31)
MCHC RBC AUTO-ENTMCNC: 33.7 G/DL (ref 32–36)
MCV RBC AUTO: 89 FL (ref 82–98)
MONOCYTES # BLD AUTO: 0.8 K/UL (ref 0.3–1)
MONOCYTES NFR BLD: 11.9 % (ref 4–15)
NEUTROPHILS # BLD AUTO: 3.8 K/UL (ref 1.8–7.7)
NEUTROPHILS NFR BLD: 56.4 % (ref 38–73)
NRBC BLD-RTO: 0 /100 WBC
PLATELET # BLD AUTO: 192 K/UL (ref 150–450)
PMV BLD AUTO: 11 FL (ref 9.2–12.9)
POCT GLUCOSE: 111 MG/DL (ref 70–110)
POCT GLUCOSE: 84 MG/DL (ref 70–110)
POCT GLUCOSE: 87 MG/DL (ref 70–110)
POTASSIUM SERPL-SCNC: 3.4 MMOL/L (ref 3.5–5.1)
PROT SERPL-MCNC: 7.2 G/DL (ref 6–8.4)
RBC # BLD AUTO: 4.13 M/UL (ref 4–5.4)
SODIUM SERPL-SCNC: 140 MMOL/L (ref 136–145)
WBC # BLD AUTO: 6.75 K/UL (ref 3.9–12.7)

## 2024-03-09 PROCEDURE — 99900035 HC TECH TIME PER 15 MIN (STAT)

## 2024-03-09 PROCEDURE — 25000003 PHARM REV CODE 250: Performed by: STUDENT IN AN ORGANIZED HEALTH CARE EDUCATION/TRAINING PROGRAM

## 2024-03-09 PROCEDURE — 85025 COMPLETE CBC W/AUTO DIFF WBC: CPT

## 2024-03-09 PROCEDURE — 25000003 PHARM REV CODE 250: Performed by: INTERNAL MEDICINE

## 2024-03-09 PROCEDURE — 21400001 HC TELEMETRY ROOM

## 2024-03-09 PROCEDURE — 27000221 HC OXYGEN, UP TO 24 HOURS

## 2024-03-09 PROCEDURE — 80053 COMPREHEN METABOLIC PANEL: CPT

## 2024-03-09 PROCEDURE — 99900031 HC PATIENT EDUCATION (STAT)

## 2024-03-09 PROCEDURE — 25000003 PHARM REV CODE 250

## 2024-03-09 PROCEDURE — 36415 COLL VENOUS BLD VENIPUNCTURE: CPT

## 2024-03-09 PROCEDURE — 94761 N-INVAS EAR/PLS OXIMETRY MLT: CPT

## 2024-03-09 PROCEDURE — 83735 ASSAY OF MAGNESIUM: CPT

## 2024-03-09 RX ORDER — FUROSEMIDE 20 MG/1
20 TABLET ORAL 2 TIMES DAILY
Status: DISCONTINUED | OUTPATIENT
Start: 2024-03-09 | End: 2024-03-10

## 2024-03-09 RX ADMIN — DAPAGLIFLOZIN 10 MG: 10 TABLET, FILM COATED ORAL at 09:03

## 2024-03-09 RX ADMIN — GUAIFENESIN 200 MG: 200 SOLUTION ORAL at 11:03

## 2024-03-09 RX ADMIN — METOPROLOL SUCCINATE 50 MG: 50 TABLET, EXTENDED RELEASE ORAL at 09:03

## 2024-03-09 RX ADMIN — FUROSEMIDE 20 MG: 20 TABLET ORAL at 05:03

## 2024-03-09 RX ADMIN — FUROSEMIDE 20 MG: 20 TABLET ORAL at 09:03

## 2024-03-09 RX ADMIN — ATORVASTATIN CALCIUM 40 MG: 40 TABLET, FILM COATED ORAL at 09:03

## 2024-03-09 RX ADMIN — ASPIRIN 81 MG: 81 TABLET ORAL at 09:03

## 2024-03-09 RX ADMIN — SPIRONOLACTONE 25 MG: 25 TABLET ORAL at 09:03

## 2024-03-09 RX ADMIN — POTASSIUM CHLORIDE 20 MEQ: 1500 TABLET, EXTENDED RELEASE ORAL at 09:03

## 2024-03-09 RX ADMIN — APIXABAN 5 MG: 5 TABLET, FILM COATED ORAL at 09:03

## 2024-03-09 RX ADMIN — ACETAMINOPHEN 650 MG: 325 TABLET ORAL at 11:03

## 2024-03-09 RX ADMIN — ISOSORBIDE MONONITRATE 30 MG: 30 TABLET, EXTENDED RELEASE ORAL at 09:03

## 2024-03-09 NOTE — ASSESSMENT & PLAN NOTE
Patient noted with HFrEF (35%) per report from Dr. Real.  Patient admitted for diuresis, close monitoring.  Starting patient on continuous Lasix infusion.  Resuming Jardiance.  Strict I&Os, 1500 mL fluid restriction.  Daily weights.    3/8 GT:  Patient is diuresing well.  Pharmacy did not have Jardiance so patient was transitioned to Farxiga.  Continue with fluid restriction, strict I&Os, Lasix infusion.  Dr. Real is following.  3/9 FM:  Converting to PO lasix, home soon.

## 2024-03-09 NOTE — ASSESSMENT & PLAN NOTE
Patient with Paroxysmal (<7 days) atrial fibrillation which is controlled currently with Beta Blocker. Patient is currently in atrial fibrillation.[id number] Score: 4. Anticoagulation indicated. Anticoagulation done with Eliquis 5 mg b.i.d. .

## 2024-03-09 NOTE — NURSING
Pt refused ACHS this evening stating she is not diabetic. She stated she was taken off metformin June 21,2023. She stated she has never monitored her BS at home and when she does lab work her levels are always good. Will continue to monitor.

## 2024-03-09 NOTE — CARE UPDATE
03/09/24 1045   PRE-TX-O2   Device (Oxygen Therapy) nasal cannula   Flow (L/min) 1   Oxygen Concentration (%) 24   SpO2 95 %   Pulse Oximetry Type Intermittent   $ Pulse Oximetry - Multiple Charge Pulse Oximetry - Multiple   Pulse 70   Respiratory Evaluation   $ Care Plan Tech Time 15 min        detailed exam warm and dry

## 2024-03-09 NOTE — NURSING
9/24/2019    Dear Gordo Hodges,    The caregivers at [facility] thank you for choosing us to be your health care partner. We want to make your visit to our [address] as comfortable and supportive as we can. You have an appointment with [facility] on Wednesday 10/2 at 0830. This appointment time allows time for you to arrive 15 minutes beforehand to complete all necessary paperwork. This appointment will take place at [facility] at [facility]. The most convenient entrance to use is located on the back northwest side of the building labeled [address] [place] [address]; please feel free to utilize the [employer] parking amenity at this entrance. You will then need to register at the reception desk inside the [address] [place] [address] Street on the 1st floor. If you enter at the main entrance of the hospital follow the signs back to the clinic or you may also stop at the desk in front of the glass elevators and a volunteer will escort you to the clinic. We will also need you to bring your insurance cards to your appointment. If you have any questions regarding insurance prior to your appointment, please contact your Cancer Nurse Navigator, Marck Oconnor RN at [phone]. Due to the nature and length of the visit, we advise that young children not be brought with you. Family members are encouraged to attend, but please limit the number to 2-3. Our exam rooms limit the ability to comfortably accommodate large parties. If you are unable to make your appointment, we require a minimum of 24 hours notice as a courtesy to other patients waiting for appointment openings. If you have any questions or concerns, you may contact your Cancer Nurse Navigator at [phone].     Regards,      The Caregivers of the   [address] [facility] placed in room.

## 2024-03-09 NOTE — ASSESSMENT & PLAN NOTE
Chronic, controlled. Latest blood pressure and vitals reviewed-     Temp:  [97.3 °F (36.3 °C)-98.5 °F (36.9 °C)]   Pulse:  []   Resp:  [16-20]   BP: (110-142)/(73-81)   SpO2:  [93 %-98 %] .   Home meds for hypertension were reviewed and noted below.   Hypertension Medications               amlodipine (NORVASC) 10 MG tablet Take 1 tablet (10 mg total) by mouth once daily.    carvedilol (COREG) 12.5 MG tablet Take 1 tablet (12.5 mg total) by mouth once daily.    furosemide (LASIX) 20 MG tablet Take 20 mg by mouth.    furosemide (LASIX) 20 MG tablet Take 1 tablet (20 mg total) by mouth every 8 (eight) hours.    isosorbide mononitrate (IMDUR) 30 MG 24 hr tablet Take 30 mg by mouth.    lisinopril-hydrochlorothiazide (PRINZIDE,ZESTORETIC) 20-25 mg Tab Take 1 tablet by mouth once daily.    metoprolol succinate (TOPROL-XL) 50 MG 24 hr tablet Take 50 mg by mouth.            Resuming metoprolol and starting patient on IV Lasix infusion

## 2024-03-09 NOTE — PROGRESS NOTES
[facility] Medicine  Progress Note    Patient Name: Edyta Gomez  MRN: [id number]  Patient Class: IP- Inpatient   Admission Date: 3/7/2024  Length of Stay: 1 days  Attending Physician: Amanda Guzman MD  Primary Care Provider: Amanda Guzman MD        Subjective:     Principal Problem:Heart failure with reduced ejection fraction        HPI:  ED HPI: 79-year-old female history of asthma, diabetes, hypertension, CHF presents to ED sent by cardiologist for CHF exacerbation.  Patient reports orthopnea and dyspnea on exertion worsening in nature for the past several days.  Has been taking her Lasix 20 mg twice a day.  Denies any chest pain.  Also endorses bilateral lower extremity swelling.     IM HPI:  79-year-old  female with a past medical history of arthritis, asthma, cataracts, type 2 diabetes, hypertension, CHF presented to the emergency department today from Dr. Real's office for acute CHF exacerbation.  Patient noted with an EF of 30 as well as rhythm change on EKG.  Patient was supposed to establish care in the clinic with me this afternoon, and had a follow up with Dr. Real this morning from her emergency department visit.  Patient reports she was previously established with ENRIKE Guaman at Dr. Muna Boswell's office, but reports she was not aware the clinic closed and when she attempted to call to make an appointment, there was no answer.  Patient reports she was evaluated in the emergency department on 02/23/2024 for shortness of breath as well as lower extremity edema.  At that time patient's chest x-ray was clear other than cardiomegaly, Dr. Real was consulted, and he recommended IV Lasix in the emergency department as well as increasing her Lasix in an outpatient setting and follow up this week.  Patient reports she has home oxygen that has been set up for quite some time, but she has not had to use it, but does endorse using it this week due to  worsening dyspnea at rest as well as exertional dyspnea.  Patient reports 3 pillow orthopnea.  Patient denies any chest pain.  Chest x-ray in the emergency department demonstrates cardiomegaly and right basilar infiltrate or atelectasis and right pleural effusion.  Cannot exclude adenopathy on the right consider CT of the chest with IV contrast.  Patient's creatinine at this time is 1.5, we will need to monitor to determine if patient can tolerate IV contrast.  BNP 2018 which is increased from 1483 from previous emergency department visit.  POCT COVID/flu negative.  Patient given IV Lasix injection in the emergency department, is maintaining oxygen saturation on room air.  Patient admitted for further diuresis and monitoring.  We will start patient on continuous IV Lasix infusion.  Consulting Dr. Real to follow patient during hospitalization.    Overview/Hospital Course:  3/8 GT:  Patient is doing well this morning.  Patient noted with a mild hypokalemia due to continuous Lasix infusion, we will increase potassium to 20 mEq b.i.d. and continue to monitor with daily labs.  Patient is diuresing well, edema to bilateral lower extremities significantly improved from yesterday.  Repeat chest x-ray this morning demonstrates some improvement in pulmonary edema, awaiting official radiology read.  Patient reports she is still having some shortness of breath, but reports it is improving.  Patient is on continuous supplemental oxygen.  We will add DuoNebs to patient's medication regimen.  3/9 FM:  Patient much improved, less dyspnea less work of breathing resting comfortably this morning.  I am discontinuing her Lasix drip and converting to p.o. Lasix patient is on an excellent heart failure medical regimen we will monitor her through the day today and likely discharge later this afternoon or in the morning.    Past Medical History:   Diagnosis Date    Arthritis     Asthma     Cataract     BILATERAL    Diabetes mellitus,  type 2     H/O: Bell's palsy     Hypertension     Wears dentures     FULL       Past Surgical History:   Procedure Laterality Date    HYSTERECTOMY      OOPHORECTOMY      right knee surgery         Review of patient's allergies indicates:   Allergen Reactions    Penicillins Rash    Sulfa (sulfonamide antibiotics) Rash       No current facility-administered medications on file prior to encounter.     Current Outpatient Medications on File Prior to Encounter   Medication Sig    amlodipine (NORVASC) 10 MG tablet Take 1 tablet (10 mg total) by mouth once daily.    aspirin (ECOTRIN) 81 MG EC tablet Take 81 mg by mouth once daily.    carvedilol (COREG) 12.5 MG tablet Take 1 tablet (12.5 mg total) by mouth once daily.    ELIQUIS 5 mg Tab Take 5 mg by mouth 2 (two) times daily.    empagliflozin (JARDIANCE) 10 mg tablet Take 10 mg by mouth once daily.    furosemide (LASIX) 20 MG tablet Take 20 mg by mouth.    furosemide (LASIX) 20 MG tablet Take 1 tablet (20 mg total) by mouth every 8 (eight) hours.    isosorbide mononitrate (IMDUR) 30 MG 24 hr tablet Take 30 mg by mouth.    lisinopril-hydrochlorothiazide (PRINZIDE,ZESTORETIC) 20-25 mg Tab Take 1 tablet by mouth once daily.    lovastatin (MEVACOR) 20 MG tablet Take 1 tablet (20 mg total) by mouth every evening.    metformin (GLUCOPHAGE) 500 MG tablet Take 2 tablets (1,000 mg total) by mouth daily with breakfast.    metoprolol succinate (TOPROL-XL) 50 MG 24 hr tablet Take 50 mg by mouth.    niacin 250 MG Tab Take 250 mg by mouth nightly.    ondansetron (ZOFRAN) 4 MG tablet Take 1 tablet (4 mg total) by mouth every 6 (six) hours.    oxybutynin (DITROPAN) 5 MG Tab Take 1 tablet (5 mg total) by mouth 2 (two) times daily.    vitamin D (VITAMIN D3) 1000 units Tab Take 185 mg by mouth once daily.     Family History       Problem Relation (Age of Onset)    Brain Hemorrhage Mother (33)    Heart disease Brother, Brother, Brother    No Known Problems Father, Sister, Maternal Aunt,  Maternal Uncle, Paternal Aunt, Paternal Uncle, Maternal Grandmother, Maternal Grandfather, Paternal Grandmother, Paternal Grandfather    Ovarian cancer Daughter          Tobacco Use    Smoking status: Never    Smokeless tobacco: Never   Substance and Sexual Activity    Alcohol use: No    Drug use: No    Sexual activity: Not on file     Comment:      Review of Systems   Constitutional:  Positive for activity change and fatigue. Negative for appetite change, chills and fever.   HENT:  Negative for ear pain, mouth sores, nosebleeds and sore throat.    Eyes:  Negative for visual disturbance.   Respiratory:  Positive for cough, shortness of breath and wheezing.    Cardiovascular:  Positive for leg swelling. Negative for chest pain and palpitations.   Gastrointestinal:  Negative for abdominal distention, abdominal pain, blood in stool, constipation, diarrhea, nausea and vomiting.   Endocrine: Negative for polyphagia.   Genitourinary:  Negative for difficulty urinating, dysuria, flank pain and frequency.   Musculoskeletal:  Positive for arthralgias. Negative for back pain and myalgias.   Skin:  Negative for rash.   Neurological:  Positive for weakness. Negative for dizziness, tremors, seizures, syncope, facial asymmetry, speech difficulty and headaches.   Hematological:  Negative for adenopathy.   Psychiatric/Behavioral:  Negative for agitation, confusion and hallucinations. The patient is not nervous/anxious.      Objective:     Vital Signs (Most Recent):  Temp: 97.3 °F (36.3 °C) (03/09/24 0715)  Pulse: 71 (03/09/24 0715)  Resp: 18 (03/09/24 0715)  BP: 112/75 (03/09/24 0715)  SpO2: 95 % (03/09/24 0715) Vital Signs (24h Range):  Temp:  [97.3 °F (36.3 °C)-98.5 °F (36.9 °C)] 97.3 °F (36.3 °C)  Pulse:  [] 71  Resp:  [16-20] 18  SpO2:  [93 %-98 %] 95 %  BP: (110-142)/(73-81) 112/75     Weight: 86.9 kg (191 lb 9.6 oz)  Body mass index is 30.01 kg/m².     Physical Exam  Constitutional:       General: She is not in  acute distress.     Appearance: She is ill-appearing (Chronically ill-appearing). She is not toxic-appearing.   HENT:      Head: Normocephalic and atraumatic.      Nose: No congestion or rhinorrhea.      Mouth/Throat:      Pharynx: No oropharyngeal exudate.      Comments: Dentures  Eyes:      General: No scleral icterus.  Cardiovascular:      Rate and Rhythm: Normal rate and regular rhythm.      Pulses: Normal pulses.      Heart sounds: Normal heart sounds. No murmur heard.     No friction rub. No gallop.   Pulmonary:      Effort: No respiratory distress.      Breath sounds: No stridor. No wheezing, rhonchi or rales.      Comments: On supplemental oxygen via nasal cannula.  Chest:      Chest wall: No tenderness.   Abdominal:      General: There is no distension.      Palpations: There is no mass.      Tenderness: There is no abdominal tenderness. There is no right CVA tenderness, left CVA tenderness, guarding or rebound.      Hernia: No hernia is present.   Musculoskeletal:         General: No swelling, tenderness or deformity.      Cervical back: Normal range of motion and neck supple. No rigidity or tenderness.      Right lower leg: No edema (+1).      Left lower leg: No edema (+1).   Lymphadenopathy:      Cervical: No cervical adenopathy.   Skin:     General: Skin is warm and dry.      Capillary Refill: Capillary refill takes less than 2 seconds.      Coloration: Skin is not jaundiced or pale.      Findings: No rash.   Neurological:      Mental Status: She is alert and oriented to person, place, and time.      Motor: Weakness (Generalized) present.   Psychiatric:         Mood and Affect: Mood normal.         Behavior: Behavior normal.                Significant Labs: All pertinent labs within the past 24 hours have been reviewed.  CBC:   Recent Labs   Lab 03/07/24  1331 03/08/24  0536 03/09/24  0521   WBC 6.69 6.72 6.75   HGB 14.1 12.7 12.4   HCT 40.8 37.2 36.8*    205 192       CMP:   Recent Labs   Lab  03/07/24  1331 03/08/24  0536 03/09/24  0521    141 140   K 3.6 3.2* 3.4*    105 105   CO2 30* 30* 34*   GLU 89 90 90   BUN 13 13 11   CREATININE 1.5* 1.3 1.3   CALCIUM 10.1 9.8 9.7   PROT 7.8 7.4 7.2   ALBUMIN 3.6 3.3* 3.1*   BILITOT 1.1* 1.0 0.8   ALKPHOS 143* 135 134   AST 15 13 14   ALT 13 13 12   ANIONGAP 3 6 1*         Significant Imaging: I have reviewed all pertinent imaging results/findings within the past 24 hours.    Assessment/Plan:      * Heart failure with reduced ejection fraction  Patient noted with HFrEF (35%) per report from Dr. Real.  Patient admitted for diuresis, close monitoring.  Starting patient on continuous Lasix infusion.  Resuming Jardiance.  Strict I&Os, 1500 mL fluid restriction.  Daily weights.    3/8 GT:  Patient is diuresing well.  Pharmacy did not have Jardiance so patient was transitioned to Farxiga.  Continue with fluid restriction, strict I&Os, Lasix infusion.  Dr. Real is following.  3/9 FM:  Converting to PO lasix, home soon.    Atrial fibrillation  Patient with Paroxysmal (<7 days) atrial fibrillation which is controlled currently with Beta Blocker. Patient is currently in atrial fibrillation.[id number] Score: 4. Anticoagulation indicated. Anticoagulation done with Eliquis 5 mg b.i.d. .    Type 2 diabetes mellitus, without long-term current use of insulin  Obtaining hemoglobin A1c today.  Sliding scale insulin.    3/8 GT:  Hemoglobin A1c 5.5.    Hypertension  Chronic, controlled. Latest blood pressure and vitals reviewed-     Temp:  [97.3 °F (36.3 °C)-98.5 °F (36.9 °C)]   Pulse:  []   Resp:  [16-20]   BP: (110-142)/(73-81)   SpO2:  [93 %-98 %] .   Home meds for hypertension were reviewed and noted below.   Hypertension Medications               amlodipine (NORVASC) 10 MG tablet Take 1 tablet (10 mg total) by mouth once daily.    carvedilol (COREG) 12.5 MG tablet Take 1 tablet (12.5 mg total) by mouth once daily.    furosemide (LASIX) 20 MG tablet Take 20  mg by mouth.    furosemide (LASIX) 20 MG tablet Take 1 tablet (20 mg total) by mouth every 8 (eight) hours.    isosorbide mononitrate (IMDUR) 30 MG 24 hr tablet Take 30 mg by mouth.    lisinopril-hydrochlorothiazide (PRINZIDE,ZESTORETIC) 20-25 mg Tab Take 1 tablet by mouth once daily.    metoprolol succinate (TOPROL-XL) 50 MG 24 hr tablet Take 50 mg by mouth.            Resuming metoprolol and starting patient on IV Lasix infusion    Hyperlipidemia  Atorvastatin 40 mg daily.        VTE Risk Mitigation (From admission, onward)           Ordered     apixaban tablet 5 mg  2 times daily         03/07/24 1622     IP VTE HIGH RISK PATIENT  Once         03/07/24 1441     Place sequential compression device  Until discontinued         03/07/24 1441                    Discharge Planning   ESTELLE:      Code Status: Full Code   Is the patient medically ready for discharge?:     Reason for patient still in hospital (select all that apply): Patient unstable and Patient trending condition  Discharge Plan A: Home Health   Discharge Delays: None known at this time              Abhishek Puente III, MD  Department of Hospital Medicine   [facility]

## 2024-03-09 NOTE — SUBJECTIVE & OBJECTIVE
Past Medical History:   Diagnosis Date    Arthritis     Asthma     Cataract     BILATERAL    Diabetes mellitus, type 2     H/O: Bell's palsy     Hypertension     Wears dentures     FULL       Past Surgical History:   Procedure Laterality Date    HYSTERECTOMY      OOPHORECTOMY      right knee surgery         Review of patient's allergies indicates:   Allergen Reactions    Penicillins Rash    Sulfa (sulfonamide antibiotics) Rash       No current facility-administered medications on file prior to encounter.     Current Outpatient Medications on File Prior to Encounter   Medication Sig    amlodipine (NORVASC) 10 MG tablet Take 1 tablet (10 mg total) by mouth once daily.    aspirin (ECOTRIN) 81 MG EC tablet Take 81 mg by mouth once daily.    carvedilol (COREG) 12.5 MG tablet Take 1 tablet (12.5 mg total) by mouth once daily.    ELIQUIS 5 mg Tab Take 5 mg by mouth 2 (two) times daily.    empagliflozin (JARDIANCE) 10 mg tablet Take 10 mg by mouth once daily.    furosemide (LASIX) 20 MG tablet Take 20 mg by mouth.    furosemide (LASIX) 20 MG tablet Take 1 tablet (20 mg total) by mouth every 8 (eight) hours.    isosorbide mononitrate (IMDUR) 30 MG 24 hr tablet Take 30 mg by mouth.    lisinopril-hydrochlorothiazide (PRINZIDE,ZESTORETIC) 20-25 mg Tab Take 1 tablet by mouth once daily.    lovastatin (MEVACOR) 20 MG tablet Take 1 tablet (20 mg total) by mouth every evening.    metformin (GLUCOPHAGE) 500 MG tablet Take 2 tablets (1,000 mg total) by mouth daily with breakfast.    metoprolol succinate (TOPROL-XL) 50 MG 24 hr tablet Take 50 mg by mouth.    niacin 250 MG Tab Take 250 mg by mouth nightly.    ondansetron (ZOFRAN) 4 MG tablet Take 1 tablet (4 mg total) by mouth every 6 (six) hours.    oxybutynin (DITROPAN) 5 MG Tab Take 1 tablet (5 mg total) by mouth 2 (two) times daily.    vitamin D (VITAMIN D3) 1000 units Tab Take 185 mg by mouth once daily.     Family History       Problem Relation (Age of Onset)    Brain Hemorrhage  Mother (33)    Heart disease Brother, Brother, Brother    No Known Problems Father, Sister, Maternal Aunt, Maternal Uncle, Paternal Aunt, Paternal Uncle, Maternal Grandmother, Maternal Grandfather, Paternal Grandmother, Paternal Grandfather    Ovarian cancer Daughter          Tobacco Use    Smoking status: Never    Smokeless tobacco: Never   Substance and Sexual Activity    Alcohol use: No    Drug use: No    Sexual activity: Not on file     Comment:      Review of Systems   Constitutional:  Positive for activity change and fatigue. Negative for appetite change, chills and fever.   HENT:  Negative for ear pain, mouth sores, nosebleeds and sore throat.    Eyes:  Negative for visual disturbance.   Respiratory:  Positive for cough, shortness of breath and wheezing.    Cardiovascular:  Positive for leg swelling. Negative for chest pain and palpitations.   Gastrointestinal:  Negative for abdominal distention, abdominal pain, blood in stool, constipation, diarrhea, nausea and vomiting.   Endocrine: Negative for polyphagia.   Genitourinary:  Negative for difficulty urinating, dysuria, flank pain and frequency.   Musculoskeletal:  Positive for arthralgias. Negative for back pain and myalgias.   Skin:  Negative for rash.   Neurological:  Positive for weakness. Negative for dizziness, tremors, seizures, syncope, facial asymmetry, speech difficulty and headaches.   Hematological:  Negative for adenopathy.   Psychiatric/Behavioral:  Negative for agitation, confusion and hallucinations. The patient is not nervous/anxious.      Objective:     Vital Signs (Most Recent):  Temp: 97.3 °F (36.3 °C) (03/09/24 0715)  Pulse: 71 (03/09/24 0715)  Resp: 18 (03/09/24 0715)  BP: 112/75 (03/09/24 0715)  SpO2: 95 % (03/09/24 0715) Vital Signs (24h Range):  Temp:  [97.3 °F (36.3 °C)-98.5 °F (36.9 °C)] 97.3 °F (36.3 °C)  Pulse:  [] 71  Resp:  [16-20] 18  SpO2:  [93 %-98 %] 95 %  BP: (110-142)/(73-81) 112/75     Weight: 86.9 kg (191 lb  9.6 oz)  Body mass index is 30.01 kg/m².     Physical Exam  Constitutional:       General: She is not in acute distress.     Appearance: She is ill-appearing (Chronically ill-appearing). She is not toxic-appearing.   HENT:      Head: Normocephalic and atraumatic.      Nose: No congestion or rhinorrhea.      Mouth/Throat:      Pharynx: No oropharyngeal exudate.      Comments: Dentures  Eyes:      General: No scleral icterus.  Cardiovascular:      Rate and Rhythm: Normal rate and regular rhythm.      Pulses: Normal pulses.      Heart sounds: Normal heart sounds. No murmur heard.     No friction rub. No gallop.   Pulmonary:      Effort: No respiratory distress.      Breath sounds: No stridor. No wheezing, rhonchi or rales.      Comments: On supplemental oxygen via nasal cannula.  Chest:      Chest wall: No tenderness.   Abdominal:      General: There is no distension.      Palpations: There is no mass.      Tenderness: There is no abdominal tenderness. There is no right CVA tenderness, left CVA tenderness, guarding or rebound.      Hernia: No hernia is present.   Musculoskeletal:         General: No swelling, tenderness or deformity.      Cervical back: Normal range of motion and neck supple. No rigidity or tenderness.      Right lower leg: No edema (+1).      Left lower leg: No edema (+1).   Lymphadenopathy:      Cervical: No cervical adenopathy.   Skin:     General: Skin is warm and dry.      Capillary Refill: Capillary refill takes less than 2 seconds.      Coloration: Skin is not jaundiced or pale.      Findings: No rash.   Neurological:      Mental Status: She is alert and oriented to person, place, and time.      Motor: Weakness (Generalized) present.   Psychiatric:         Mood and Affect: Mood normal.         Behavior: Behavior normal.                Significant Labs: All pertinent labs within the past 24 hours have been reviewed.  CBC:   Recent Labs   Lab 03/07/24  1331 03/08/24  0536 03/09/24  0521   WBC 6.69  6.72 6.75   HGB 14.1 12.7 12.4   HCT 40.8 37.2 36.8*    205 192       CMP:   Recent Labs   Lab 03/07/24  1331 03/08/24  0536 03/09/24  0521    141 140   K 3.6 3.2* 3.4*    105 105   CO2 30* 30* 34*   GLU 89 90 90   BUN 13 13 11   CREATININE 1.5* 1.3 1.3   CALCIUM 10.1 9.8 9.7   PROT 7.8 7.4 7.2   ALBUMIN 3.6 3.3* 3.1*   BILITOT 1.1* 1.0 0.8   ALKPHOS 143* 135 134   AST 15 13 14   ALT 13 13 12   ANIONGAP 3 6 1*         Significant Imaging: I have reviewed all pertinent imaging results/findings within the past 24 hours.

## 2024-03-09 NOTE — PLAN OF CARE
Problem: Adult Inpatient Plan of Care  Goal: Readiness for Transition of Care  3/9/2024 0346 by Deb Bragg RN  Outcome: Ongoing, Not Progressing        Problem: Adult Inpatient Plan of Care  Goal: Plan of Care Review  3/9/2024 0346 by Deb Bragg RN  Outcome: Ongoing, Progressing  Goal: Patient-Specific Goal (Individualized)  3/9/2024 0346 by Deb Bragg RN  Outcome: Ongoing, Progressing  3/9/2024 0345 by Deb Bragg RN  Outcome: Ongoing, Progressing  Goal: Absence of Hospital-Acquired Illness or Injury  3/9/2024 0346 by Deb Bragg RN  Outcome: Ongoing, Progressing  Goal: Optimal Comfort and Wellbeing  3/9/2024 0346 by Deb Bragg RN  Outcome: Ongoing, Progressing     Problem: Diabetes Comorbidity  Goal: Blood Glucose Level Within Targeted Range  3/9/2024 0346 by Deb Bragg RN  Outcome: Ongoing, Progressing     Problem: Skin Injury Risk Increased  Goal: Skin Health and Integrity  3/9/2024 0346 by Deb Bragg RN  Outcome: Ongoing, Progressing     Problem: Fall Injury Risk  Goal: Absence of Fall and Fall-Related Injury  Outcome: Ongoing, Progressing

## 2024-03-10 PROBLEM — R07.9 CHEST PAIN: Status: ACTIVE | Noted: 2024-03-10

## 2024-03-10 LAB
ALBUMIN SERPL BCP-MCNC: 3.1 G/DL (ref 3.5–5.2)
ALP SERPL-CCNC: 137 U/L (ref 55–135)
ALT SERPL W/O P-5'-P-CCNC: 14 U/L (ref 10–44)
ANION GAP SERPL CALC-SCNC: 6 MMOL/L (ref 3–11)
AST SERPL-CCNC: 16 U/L (ref 10–40)
BASOPHILS # BLD AUTO: 0.08 K/UL (ref 0–0.2)
BASOPHILS NFR BLD: 1 % (ref 0–1.9)
BILIRUB SERPL-MCNC: 0.8 MG/DL (ref 0.1–1)
BUN SERPL-MCNC: 14 MG/DL (ref 8–23)
CALCIUM SERPL-MCNC: 9.3 MG/DL (ref 8.7–10.5)
CHLORIDE SERPL-SCNC: 103 MMOL/L (ref 95–110)
CO2 SERPL-SCNC: 29 MMOL/L (ref 23–29)
CREAT SERPL-MCNC: 1.3 MG/DL (ref 0.5–1.4)
DIFFERENTIAL METHOD BLD: ABNORMAL
EOSINOPHIL # BLD AUTO: 0.4 K/UL (ref 0–0.5)
EOSINOPHIL NFR BLD: 5.8 % (ref 0–8)
ERYTHROCYTE [DISTWIDTH] IN BLOOD BY AUTOMATED COUNT: 17.2 % (ref 11.5–14.5)
EST. GFR  (NO RACE VARIABLE): 42.1 ML/MIN/1.73 M^2
GLUCOSE SERPL-MCNC: 85 MG/DL (ref 70–110)
HCT VFR BLD AUTO: 37.3 % (ref 37–48.5)
HGB BLD-MCNC: 12.8 G/DL (ref 12–16)
IMM GRANULOCYTES # BLD AUTO: 0.01 K/UL (ref 0–0.04)
IMM GRANULOCYTES NFR BLD AUTO: 0.1 % (ref 0–0.5)
LYMPHOCYTES # BLD AUTO: 2.1 K/UL (ref 1–4.8)
LYMPHOCYTES NFR BLD: 27.4 % (ref 18–48)
MAGNESIUM SERPL-MCNC: 2.3 MG/DL (ref 1.6–2.6)
MCH RBC QN AUTO: 30.7 PG (ref 27–31)
MCHC RBC AUTO-ENTMCNC: 34.3 G/DL (ref 32–36)
MCV RBC AUTO: 89 FL (ref 82–98)
MONOCYTES # BLD AUTO: 0.9 K/UL (ref 0.3–1)
MONOCYTES NFR BLD: 12 % (ref 4–15)
NEUTROPHILS # BLD AUTO: 4.1 K/UL (ref 1.8–7.7)
NEUTROPHILS NFR BLD: 53.7 % (ref 38–73)
NRBC BLD-RTO: 0 /100 WBC
OHS QRS DURATION: 80 MS
OHS QTC CALCULATION: 479 MS
PLATELET # BLD AUTO: 188 K/UL (ref 150–450)
PMV BLD AUTO: 11.4 FL (ref 9.2–12.9)
POCT GLUCOSE: 81 MG/DL (ref 70–110)
POTASSIUM SERPL-SCNC: 3.7 MMOL/L (ref 3.5–5.1)
PROT SERPL-MCNC: 7.4 G/DL (ref 6–8.4)
RBC # BLD AUTO: 4.17 M/UL (ref 4–5.4)
SODIUM SERPL-SCNC: 138 MMOL/L (ref 136–145)
TROPONIN I SERPL DL<=0.01 NG/ML-MCNC: 10.8 PG/ML (ref 0–60)
TROPONIN I SERPL DL<=0.01 NG/ML-MCNC: 12 PG/ML (ref 0–60)
WBC # BLD AUTO: 7.64 K/UL (ref 3.9–12.7)

## 2024-03-10 PROCEDURE — 83735 ASSAY OF MAGNESIUM: CPT

## 2024-03-10 PROCEDURE — 85025 COMPLETE CBC W/AUTO DIFF WBC: CPT

## 2024-03-10 PROCEDURE — 25000003 PHARM REV CODE 250

## 2024-03-10 PROCEDURE — 25000003 PHARM REV CODE 250: Performed by: STUDENT IN AN ORGANIZED HEALTH CARE EDUCATION/TRAINING PROGRAM

## 2024-03-10 PROCEDURE — 25000003 PHARM REV CODE 250: Performed by: INTERNAL MEDICINE

## 2024-03-10 PROCEDURE — 84484 ASSAY OF TROPONIN QUANT: CPT | Performed by: INTERNAL MEDICINE

## 2024-03-10 PROCEDURE — 99900035 HC TECH TIME PER 15 MIN (STAT)

## 2024-03-10 PROCEDURE — 94761 N-INVAS EAR/PLS OXIMETRY MLT: CPT

## 2024-03-10 PROCEDURE — 99900031 HC PATIENT EDUCATION (STAT)

## 2024-03-10 PROCEDURE — 93005 ELECTROCARDIOGRAM TRACING: CPT

## 2024-03-10 PROCEDURE — 21400001 HC TELEMETRY ROOM

## 2024-03-10 PROCEDURE — 36415 COLL VENOUS BLD VENIPUNCTURE: CPT | Mod: XB | Performed by: INTERNAL MEDICINE

## 2024-03-10 PROCEDURE — 93010 ELECTROCARDIOGRAM REPORT: CPT | Mod: ,,, | Performed by: INTERNAL MEDICINE

## 2024-03-10 PROCEDURE — 27000221 HC OXYGEN, UP TO 24 HOURS

## 2024-03-10 PROCEDURE — 80053 COMPREHEN METABOLIC PANEL: CPT

## 2024-03-10 PROCEDURE — 36415 COLL VENOUS BLD VENIPUNCTURE: CPT

## 2024-03-10 PROCEDURE — 63600175 PHARM REV CODE 636 W HCPCS: Performed by: INTERNAL MEDICINE

## 2024-03-10 RX ORDER — FUROSEMIDE 10 MG/ML
20 INJECTION INTRAMUSCULAR; INTRAVENOUS EVERY 8 HOURS
Status: DISCONTINUED | OUTPATIENT
Start: 2024-03-10 | End: 2024-03-10

## 2024-03-10 RX ORDER — AMIODARONE HYDROCHLORIDE 200 MG/1
200 TABLET ORAL DAILY
Status: DISCONTINUED | OUTPATIENT
Start: 2024-03-11 | End: 2024-03-11 | Stop reason: HOSPADM

## 2024-03-10 RX ORDER — FUROSEMIDE 40 MG/1
40 TABLET ORAL 2 TIMES DAILY
Status: DISCONTINUED | OUTPATIENT
Start: 2024-03-11 | End: 2024-03-11 | Stop reason: HOSPADM

## 2024-03-10 RX ADMIN — DAPAGLIFLOZIN 10 MG: 10 TABLET, FILM COATED ORAL at 09:03

## 2024-03-10 RX ADMIN — ASPIRIN 81 MG: 81 TABLET ORAL at 09:03

## 2024-03-10 RX ADMIN — POTASSIUM CHLORIDE 20 MEQ: 1500 TABLET, EXTENDED RELEASE ORAL at 09:03

## 2024-03-10 RX ADMIN — Medication 6 MG: at 09:03

## 2024-03-10 RX ADMIN — FUROSEMIDE 20 MG: 20 TABLET ORAL at 09:03

## 2024-03-10 RX ADMIN — APIXABAN 5 MG: 5 TABLET, FILM COATED ORAL at 09:03

## 2024-03-10 RX ADMIN — METOPROLOL SUCCINATE 50 MG: 50 TABLET, EXTENDED RELEASE ORAL at 09:03

## 2024-03-10 RX ADMIN — FUROSEMIDE 20 MG: 10 INJECTION, SOLUTION INTRAMUSCULAR; INTRAVENOUS at 03:03

## 2024-03-10 RX ADMIN — ISOSORBIDE MONONITRATE 30 MG: 30 TABLET, EXTENDED RELEASE ORAL at 09:03

## 2024-03-10 RX ADMIN — ATORVASTATIN CALCIUM 40 MG: 40 TABLET, FILM COATED ORAL at 09:03

## 2024-03-10 RX ADMIN — FUROSEMIDE 20 MG: 10 INJECTION, SOLUTION INTRAMUSCULAR; INTRAVENOUS at 09:03

## 2024-03-10 RX ADMIN — SPIRONOLACTONE 25 MG: 25 TABLET ORAL at 09:03

## 2024-03-10 NOTE — ASSESSMENT & PLAN NOTE
Patient noted with HFrEF (35%) per report from Dr. Real.  Patient admitted for diuresis, close monitoring.  Starting patient on continuous Lasix infusion.  Resuming Jardiance.  Strict I&Os, 1500 mL fluid restriction.  Daily weights.    3/8 GT:  Patient is diuresing well.  Pharmacy did not have Jardiance so patient was transitioned to Farxiga.  Continue with fluid restriction, strict I&Os, Lasix infusion.  Dr. Real is following.  3/9 FM:  Converting to PO lasix, home soon.  3/10 FM:  Improved, continue diuresis and MM.

## 2024-03-10 NOTE — ASSESSMENT & PLAN NOTE
Patient with Paroxysmal (<7 days) atrial fibrillation which is controlled currently with Beta Blocker. Patient is currently in atrial fibrillation.[id number] Score: 4. Anticoagulation indicated. Anticoagulation done with Eliquis 5 mg b.i.d. .

## 2024-03-10 NOTE — ASSESSMENT & PLAN NOTE
Chronic, controlled. Latest blood pressure and vitals reviewed-     Temp:  [98 °F (36.7 °C)-98.5 °F (36.9 °C)]   Pulse:  [54-93]   Resp:  [18]   BP: ()/(58-73)   SpO2:  [90 %-96 %] .   Home meds for hypertension were reviewed and noted below.   Hypertension Medications               amlodipine (NORVASC) 10 MG tablet Take 1 tablet (10 mg total) by mouth once daily.    carvedilol (COREG) 12.5 MG tablet Take 1 tablet (12.5 mg total) by mouth once daily.    furosemide (LASIX) 20 MG tablet Take 20 mg by mouth.    furosemide (LASIX) 20 MG tablet Take 1 tablet (20 mg total) by mouth every 8 (eight) hours.    isosorbide mononitrate (IMDUR) 30 MG 24 hr tablet Take 30 mg by mouth.    lisinopril-hydrochlorothiazide (PRINZIDE,ZESTORETIC) 20-25 mg Tab Take 1 tablet by mouth once daily.    metoprolol succinate (TOPROL-XL) 50 MG 24 hr tablet Take 50 mg by mouth.            Resuming metoprolol and starting patient on IV Lasix infusion

## 2024-03-10 NOTE — PLAN OF CARE
Problem: Adult Inpatient Plan of Care  Goal: Optimal Comfort and Wellbeing  Outcome: Ongoing, Not Progressing  Goal: Readiness for Transition of Care  Outcome: Ongoing, Not Progressing         Problem: Adult Inpatient Plan of Care  Goal: Plan of Care Review  Outcome: Ongoing, Progressing  Goal: Patient-Specific Goal (Individualized)  Outcome: Ongoing, Progressing  Goal: Absence of Hospital-Acquired Illness or Injury  Outcome: Ongoing, Progressing     Problem: Diabetes Comorbidity  Goal: Blood Glucose Level Within Targeted Range  Outcome: Ongoing, Progressing     Problem: Skin Injury Risk Increased  Goal: Skin Health and Integrity  Outcome: Ongoing, Progressing     Problem: Fall Injury Risk  Goal: Absence of Fall and Fall-Related Injury  Outcome: Ongoing, Progressing

## 2024-03-10 NOTE — PROGRESS NOTES
[facility] Medicine  Progress Note    Patient Name: Edyta Gomez  MRN: [id number]  Patient Class: IP- Inpatient   Admission Date: 3/7/2024  Length of Stay: 2 days  Attending Physician: Amanda Guzman MD  Primary Care Provider: Amanda Guzman MD        Subjective:     Principal Problem:Heart failure with reduced ejection fraction        HPI:  ED HPI: 79-year-old female history of asthma, diabetes, hypertension, CHF presents to ED sent by cardiologist for CHF exacerbation.  Patient reports orthopnea and dyspnea on exertion worsening in nature for the past several days.  Has been taking her Lasix 20 mg twice a day.  Denies any chest pain.  Also endorses bilateral lower extremity swelling.     IM HPI:  79-year-old  female with a past medical history of arthritis, asthma, cataracts, type 2 diabetes, hypertension, CHF presented to the emergency department today from Dr. Real's office for acute CHF exacerbation.  Patient noted with an EF of 30 as well as rhythm change on EKG.  Patient was supposed to establish care in the clinic with me this afternoon, and had a follow up with Dr. Real this morning from her emergency department visit.  Patient reports she was previously established with ENRIKE Guaman at Dr. Muna Boswell's office, but reports she was not aware the clinic closed and when she attempted to call to make an appointment, there was no answer.  Patient reports she was evaluated in the emergency department on 02/23/2024 for shortness of breath as well as lower extremity edema.  At that time patient's chest x-ray was clear other than cardiomegaly, Dr. Real was consulted, and he recommended IV Lasix in the emergency department as well as increasing her Lasix in an outpatient setting and follow up this week.  Patient reports she has home oxygen that has been set up for quite some time, but she has not had to use it, but does endorse using it this week due to  worsening dyspnea at rest as well as exertional dyspnea.  Patient reports 3 pillow orthopnea.  Patient denies any chest pain.  Chest x-ray in the emergency department demonstrates cardiomegaly and right basilar infiltrate or atelectasis and right pleural effusion.  Cannot exclude adenopathy on the right consider CT of the chest with IV contrast.  Patient's creatinine at this time is 1.5, we will need to monitor to determine if patient can tolerate IV contrast.  BNP 2018 which is increased from 1483 from previous emergency department visit.  POCT COVID/flu negative.  Patient given IV Lasix injection in the emergency department, is maintaining oxygen saturation on room air.  Patient admitted for further diuresis and monitoring.  We will start patient on continuous IV Lasix infusion.  Consulting Dr. Real to follow patient during hospitalization.    Overview/Hospital Course:  3/8 GT:  Patient is doing well this morning.  Patient noted with a mild hypokalemia due to continuous Lasix infusion, we will increase potassium to 20 mEq b.i.d. and continue to monitor with daily labs.  Patient is diuresing well, edema to bilateral lower extremities significantly improved from yesterday.  Repeat chest x-ray this morning demonstrates some improvement in pulmonary edema, awaiting official radiology read.  Patient reports she is still having some shortness of breath, but reports it is improving.  Patient is on continuous supplemental oxygen.  We will add DuoNebs to patient's medication regimen.  3/9 FM:  Patient much improved, less dyspnea less work of breathing resting comfortably this morning.  I am discontinuing her Lasix drip and converting to p.o. Lasix patient is on an excellent heart failure medical regimen we will monitor her through the day today and likely discharge later this afternoon or in the morning.  3/10 FM:  Patient last night had an episode of chest pain.  Patient's pain was across her chest lasted for several  minutes resolve with Tylenol.  Patient and I discussed discharge plan and she would prefer to stay another night and speak with her cardiologist tomorrow.  We will change her Lasix back to IV she still has peripheral edema she is still requiring oxygen will have the nursing staff wean her O2.    Past Medical History:   Diagnosis Date    Arthritis     Asthma     Cataract     BILATERAL    Diabetes mellitus, type 2     H/O: Bell's palsy     Hypertension     Wears dentures     FULL       Past Surgical History:   Procedure Laterality Date    HYSTERECTOMY      OOPHORECTOMY      right knee surgery         Review of patient's allergies indicates:   Allergen Reactions    Penicillins Rash    Sulfa (sulfonamide antibiotics) Rash       No current facility-administered medications on file prior to encounter.     Current Outpatient Medications on File Prior to Encounter   Medication Sig    amlodipine (NORVASC) 10 MG tablet Take 1 tablet (10 mg total) by mouth once daily.    aspirin (ECOTRIN) 81 MG EC tablet Take 81 mg by mouth once daily.    carvedilol (COREG) 12.5 MG tablet Take 1 tablet (12.5 mg total) by mouth once daily.    ELIQUIS 5 mg Tab Take 5 mg by mouth 2 (two) times daily.    empagliflozin (JARDIANCE) 10 mg tablet Take 10 mg by mouth once daily.    furosemide (LASIX) 20 MG tablet Take 20 mg by mouth.    furosemide (LASIX) 20 MG tablet Take 1 tablet (20 mg total) by mouth every 8 (eight) hours.    isosorbide mononitrate (IMDUR) 30 MG 24 hr tablet Take 30 mg by mouth.    lisinopril-hydrochlorothiazide (PRINZIDE,ZESTORETIC) 20-25 mg Tab Take 1 tablet by mouth once daily.    lovastatin (MEVACOR) 20 MG tablet Take 1 tablet (20 mg total) by mouth every evening.    metformin (GLUCOPHAGE) 500 MG tablet Take 2 tablets (1,000 mg total) by mouth daily with breakfast.    metoprolol succinate (TOPROL-XL) 50 MG 24 hr tablet Take 50 mg by mouth.    niacin 250 MG Tab Take 250 mg by mouth nightly.    ondansetron (ZOFRAN) 4 MG tablet  Take 1 tablet (4 mg total) by mouth every 6 (six) hours.    oxybutynin (DITROPAN) 5 MG Tab Take 1 tablet (5 mg total) by mouth 2 (two) times daily.    vitamin D (VITAMIN D3) 1000 units Tab Take 185 mg by mouth once daily.     Family History       Problem Relation (Age of Onset)    Brain Hemorrhage Mother (33)    Heart disease Brother, Brother, Brother    No Known Problems Father, Sister, Maternal Aunt, Maternal Uncle, Paternal Aunt, Paternal Uncle, Maternal Grandmother, Maternal Grandfather, Paternal Grandmother, Paternal Grandfather    Ovarian cancer Daughter          Tobacco Use    Smoking status: Never    Smokeless tobacco: Never   Substance and Sexual Activity    Alcohol use: No    Drug use: No    Sexual activity: Not on file     Comment:      Review of Systems   Constitutional:  Positive for activity change and fatigue. Negative for appetite change, chills and fever.   HENT:  Negative for ear pain, mouth sores, nosebleeds and sore throat.    Eyes:  Negative for visual disturbance.   Respiratory:  Positive for cough, shortness of breath and wheezing.    Cardiovascular:  Positive for chest pain and leg swelling. Negative for palpitations.   Gastrointestinal:  Negative for abdominal distention, abdominal pain, blood in stool, constipation, diarrhea, nausea and vomiting.   Endocrine: Negative for polyphagia.   Genitourinary:  Negative for difficulty urinating, dysuria, flank pain and frequency.   Musculoskeletal:  Positive for arthralgias. Negative for back pain and myalgias.   Skin:  Negative for rash.   Neurological:  Positive for weakness. Negative for dizziness, tremors, seizures, syncope, facial asymmetry, speech difficulty and headaches.   Hematological:  Negative for adenopathy.   Psychiatric/Behavioral:  Negative for agitation, confusion and hallucinations. The patient is not nervous/anxious.      Objective:     Vital Signs (Most Recent):  Temp: 98 °F (36.7 °C) (03/10/24 0716)  Pulse: 84 (03/10/24  0854)  Resp: 18 (03/10/24 0716)  BP: (!) 118/58 (03/10/24 0716)  SpO2: 96 % (03/10/24 0716) Vital Signs (24h Range):  Temp:  [98 °F (36.7 °C)-98.5 °F (36.9 °C)] 98 °F (36.7 °C)  Pulse:  [54-93] 84  Resp:  [18] 18  SpO2:  [90 %-96 %] 96 %  BP: ()/(58-73) 118/58     Weight: 86.9 kg (191 lb 9.6 oz)  Body mass index is 30.01 kg/m².     Physical Exam  Constitutional:       General: She is not in acute distress.     Appearance: She is not ill-appearing or toxic-appearing.   HENT:      Head: Normocephalic and atraumatic.      Nose: No congestion or rhinorrhea.      Mouth/Throat:      Pharynx: No oropharyngeal exudate.      Comments: Dentures  Eyes:      General: No scleral icterus.  Cardiovascular:      Rate and Rhythm: Normal rate and regular rhythm.      Pulses: Normal pulses.      Heart sounds: Normal heart sounds. No murmur heard.     No friction rub. No gallop.   Pulmonary:      Effort: No respiratory distress.      Breath sounds: No stridor. No wheezing, rhonchi or rales.      Comments: On supplemental oxygen via nasal cannula.  Chest:      Chest wall: No tenderness.   Abdominal:      General: There is no distension.      Palpations: There is no mass.      Tenderness: There is no abdominal tenderness. There is no right CVA tenderness, left CVA tenderness, guarding or rebound.      Hernia: No hernia is present.   Musculoskeletal:         General: No swelling, tenderness or deformity.      Cervical back: Neck supple. No rigidity or tenderness.      Right lower leg: No edema (+1).      Left lower leg: No edema (+1).   Lymphadenopathy:      Cervical: No cervical adenopathy.   Skin:     General: Skin is warm and dry.      Capillary Refill: Capillary refill takes less than 2 seconds.      Coloration: Skin is not jaundiced or pale.      Findings: No rash.   Neurological:      Mental Status: She is alert and oriented to person, place, and time.      Motor: Weakness (Generalized) present.   Psychiatric:         Mood and  Affect: Mood normal.         Behavior: Behavior normal.                Significant Labs: All pertinent labs within the past 24 hours have been reviewed.  CBC:   Recent Labs   Lab 03/09/24  0521 03/10/24  0511   WBC 6.75 7.64   HGB 12.4 12.8   HCT 36.8* 37.3    188       CMP:   Recent Labs   Lab 03/09/24  0521 03/10/24  0511    138   K 3.4* 3.7    103   CO2 34* 29   GLU 90 85   BUN 11 14   CREATININE 1.3 1.3   CALCIUM 9.7 9.3   PROT 7.2 7.4   ALBUMIN 3.1* 3.1*   BILITOT 0.8 0.8   ALKPHOS 134 137*   AST 14 16   ALT 12 14   ANIONGAP 1* 6         Significant Imaging: I have reviewed all pertinent imaging results/findings within the past 24 hours.    Assessment/Plan:      * Heart failure with reduced ejection fraction  Patient noted with HFrEF (35%) per report from Dr. Real.  Patient admitted for diuresis, close monitoring.  Starting patient on continuous Lasix infusion.  Resuming Jardiance.  Strict I&Os, 1500 mL fluid restriction.  Daily weights.    3/8 GT:  Patient is diuresing well.  Pharmacy did not have Jardiance so patient was transitioned to Farxiga.  Continue with fluid restriction, strict I&Os, Lasix infusion.  Dr. Real is following.  3/9 FM:  Converting to PO lasix, home soon.  3/10 FM:  Improved, continue diuresis and MM.    Chest pain    3/10 FM:  Check troponin, would like to see cards in am.    Atrial fibrillation  Patient with Paroxysmal (<7 days) atrial fibrillation which is controlled currently with Beta Blocker. Patient is currently in atrial fibrillation.[id number] Score: 4. Anticoagulation indicated. Anticoagulation done with Eliquis 5 mg b.i.d. .    Type 2 diabetes mellitus, without long-term current use of insulin  Obtaining hemoglobin A1c today.  Sliding scale insulin.    3/8 GT:  Hemoglobin A1c 5.5.    Hypertension  Chronic, controlled. Latest blood pressure and vitals reviewed-     Temp:  [98 °F (36.7 °C)-98.5 °F (36.9 °C)]   Pulse:  [54-93]   Resp:  [18]   BP:  ()/(58-73)   SpO2:  [90 %-96 %] .   Home meds for hypertension were reviewed and noted below.   Hypertension Medications               amlodipine (NORVASC) 10 MG tablet Take 1 tablet (10 mg total) by mouth once daily.    carvedilol (COREG) 12.5 MG tablet Take 1 tablet (12.5 mg total) by mouth once daily.    furosemide (LASIX) 20 MG tablet Take 20 mg by mouth.    furosemide (LASIX) 20 MG tablet Take 1 tablet (20 mg total) by mouth every 8 (eight) hours.    isosorbide mononitrate (IMDUR) 30 MG 24 hr tablet Take 30 mg by mouth.    lisinopril-hydrochlorothiazide (PRINZIDE,ZESTORETIC) 20-25 mg Tab Take 1 tablet by mouth once daily.    metoprolol succinate (TOPROL-XL) 50 MG 24 hr tablet Take 50 mg by mouth.            Resuming metoprolol and starting patient on IV Lasix infusion    Hyperlipidemia  Atorvastatin 40 mg daily.        VTE Risk Mitigation (From admission, onward)           Ordered     apixaban tablet 5 mg  2 times daily         03/07/24 1622     IP VTE HIGH RISK PATIENT  Once         03/07/24 1441     Place sequential compression device  Until discontinued         03/07/24 1441                    Discharge Planning   ESTELLE:      Code Status: Full Code   Is the patient medically ready for discharge?:     Reason for patient still in hospital (select all that apply): Patient trending condition  Discharge Plan A: Home Health   Discharge Delays: None known at this time              Abhishek Puente III, MD  Department of Hospital Medicine   [facility]

## 2024-03-10 NOTE — SUBJECTIVE & OBJECTIVE
Past Medical History:   Diagnosis Date    Arthritis     Asthma     Cataract     BILATERAL    Diabetes mellitus, type 2     H/O: Bell's palsy     Hypertension     Wears dentures     FULL       Past Surgical History:   Procedure Laterality Date    HYSTERECTOMY      OOPHORECTOMY      right knee surgery         Review of patient's allergies indicates:   Allergen Reactions    Penicillins Rash    Sulfa (sulfonamide antibiotics) Rash       No current facility-administered medications on file prior to encounter.     Current Outpatient Medications on File Prior to Encounter   Medication Sig    amlodipine (NORVASC) 10 MG tablet Take 1 tablet (10 mg total) by mouth once daily.    aspirin (ECOTRIN) 81 MG EC tablet Take 81 mg by mouth once daily.    carvedilol (COREG) 12.5 MG tablet Take 1 tablet (12.5 mg total) by mouth once daily.    ELIQUIS 5 mg Tab Take 5 mg by mouth 2 (two) times daily.    empagliflozin (JARDIANCE) 10 mg tablet Take 10 mg by mouth once daily.    furosemide (LASIX) 20 MG tablet Take 20 mg by mouth.    furosemide (LASIX) 20 MG tablet Take 1 tablet (20 mg total) by mouth every 8 (eight) hours.    isosorbide mononitrate (IMDUR) 30 MG 24 hr tablet Take 30 mg by mouth.    lisinopril-hydrochlorothiazide (PRINZIDE,ZESTORETIC) 20-25 mg Tab Take 1 tablet by mouth once daily.    lovastatin (MEVACOR) 20 MG tablet Take 1 tablet (20 mg total) by mouth every evening.    metformin (GLUCOPHAGE) 500 MG tablet Take 2 tablets (1,000 mg total) by mouth daily with breakfast.    metoprolol succinate (TOPROL-XL) 50 MG 24 hr tablet Take 50 mg by mouth.    niacin 250 MG Tab Take 250 mg by mouth nightly.    ondansetron (ZOFRAN) 4 MG tablet Take 1 tablet (4 mg total) by mouth every 6 (six) hours.    oxybutynin (DITROPAN) 5 MG Tab Take 1 tablet (5 mg total) by mouth 2 (two) times daily.    vitamin D (VITAMIN D3) 1000 units Tab Take 185 mg by mouth once daily.     Family History       Problem Relation (Age of Onset)    Brain Hemorrhage  Mother (33)    Heart disease Brother, Brother, Brother    No Known Problems Father, Sister, Maternal Aunt, Maternal Uncle, Paternal Aunt, Paternal Uncle, Maternal Grandmother, Maternal Grandfather, Paternal Grandmother, Paternal Grandfather    Ovarian cancer Daughter          Tobacco Use    Smoking status: Never    Smokeless tobacco: Never   Substance and Sexual Activity    Alcohol use: No    Drug use: No    Sexual activity: Not on file     Comment:      Review of Systems   Constitutional:  Positive for activity change and fatigue. Negative for appetite change, chills and fever.   HENT:  Negative for ear pain, mouth sores, nosebleeds and sore throat.    Eyes:  Negative for visual disturbance.   Respiratory:  Positive for cough, shortness of breath and wheezing.    Cardiovascular:  Positive for chest pain and leg swelling. Negative for palpitations.   Gastrointestinal:  Negative for abdominal distention, abdominal pain, blood in stool, constipation, diarrhea, nausea and vomiting.   Endocrine: Negative for polyphagia.   Genitourinary:  Negative for difficulty urinating, dysuria, flank pain and frequency.   Musculoskeletal:  Positive for arthralgias. Negative for back pain and myalgias.   Skin:  Negative for rash.   Neurological:  Positive for weakness. Negative for dizziness, tremors, seizures, syncope, facial asymmetry, speech difficulty and headaches.   Hematological:  Negative for adenopathy.   Psychiatric/Behavioral:  Negative for agitation, confusion and hallucinations. The patient is not nervous/anxious.      Objective:     Vital Signs (Most Recent):  Temp: 98 °F (36.7 °C) (03/10/24 0716)  Pulse: 84 (03/10/24 0854)  Resp: 18 (03/10/24 0716)  BP: (!) 118/58 (03/10/24 0716)  SpO2: 96 % (03/10/24 0716) Vital Signs (24h Range):  Temp:  [98 °F (36.7 °C)-98.5 °F (36.9 °C)] 98 °F (36.7 °C)  Pulse:  [54-93] 84  Resp:  [18] 18  SpO2:  [90 %-96 %] 96 %  BP: ()/(58-73) 118/58     Weight: 86.9 kg (191 lb 9.6  oz)  Body mass index is 30.01 kg/m².     Physical Exam  Constitutional:       General: She is not in acute distress.     Appearance: She is not ill-appearing or toxic-appearing.   HENT:      Head: Normocephalic and atraumatic.      Nose: No congestion or rhinorrhea.      Mouth/Throat:      Pharynx: No oropharyngeal exudate.      Comments: Dentures  Eyes:      General: No scleral icterus.  Cardiovascular:      Rate and Rhythm: Normal rate and regular rhythm.      Pulses: Normal pulses.      Heart sounds: Normal heart sounds. No murmur heard.     No friction rub. No gallop.   Pulmonary:      Effort: No respiratory distress.      Breath sounds: No stridor. No wheezing, rhonchi or rales.      Comments: On supplemental oxygen via nasal cannula.  Chest:      Chest wall: No tenderness.   Abdominal:      General: There is no distension.      Palpations: There is no mass.      Tenderness: There is no abdominal tenderness. There is no right CVA tenderness, left CVA tenderness, guarding or rebound.      Hernia: No hernia is present.   Musculoskeletal:         General: No swelling, tenderness or deformity.      Cervical back: Neck supple. No rigidity or tenderness.      Right lower leg: No edema (+1).      Left lower leg: No edema (+1).   Lymphadenopathy:      Cervical: No cervical adenopathy.   Skin:     General: Skin is warm and dry.      Capillary Refill: Capillary refill takes less than 2 seconds.      Coloration: Skin is not jaundiced or pale.      Findings: No rash.   Neurological:      Mental Status: She is alert and oriented to person, place, and time.      Motor: Weakness (Generalized) present.   Psychiatric:         Mood and Affect: Mood normal.         Behavior: Behavior normal.                Significant Labs: All pertinent labs within the past 24 hours have been reviewed.  CBC:   Recent Labs   Lab 03/09/24  0521 03/10/24  0511   WBC 6.75 7.64   HGB 12.4 12.8   HCT 36.8* 37.3    188       CMP:   Recent Labs    Lab 03/09/24  0521 03/10/24  0511    138   K 3.4* 3.7    103   CO2 34* 29   GLU 90 85   BUN 11 14   CREATININE 1.3 1.3   CALCIUM 9.7 9.3   PROT 7.2 7.4   ALBUMIN 3.1* 3.1*   BILITOT 0.8 0.8   ALKPHOS 134 137*   AST 14 16   ALT 12 14   ANIONGAP 1* 6         Significant Imaging: I have reviewed all pertinent imaging results/findings within the past 24 hours.

## 2024-03-11 VITALS
BODY MASS INDEX: 29.82 KG/M2 | HEIGHT: 67 IN | DIASTOLIC BLOOD PRESSURE: 86 MMHG | WEIGHT: 190 LBS | TEMPERATURE: 98 F | RESPIRATION RATE: 18 BRPM | OXYGEN SATURATION: 96 % | HEART RATE: 76 BPM | SYSTOLIC BLOOD PRESSURE: 121 MMHG

## 2024-03-11 LAB
ALBUMIN SERPL BCP-MCNC: 3.1 G/DL (ref 3.5–5.2)
ALP SERPL-CCNC: 145 U/L (ref 55–135)
ALT SERPL W/O P-5'-P-CCNC: 14 U/L (ref 10–44)
ANION GAP SERPL CALC-SCNC: 2 MMOL/L (ref 3–11)
AST SERPL-CCNC: 19 U/L (ref 10–40)
BASOPHILS # BLD AUTO: 0.06 K/UL (ref 0–0.2)
BASOPHILS NFR BLD: 1 % (ref 0–1.9)
BILIRUB SERPL-MCNC: 0.7 MG/DL (ref 0.1–1)
BUN SERPL-MCNC: 16 MG/DL (ref 8–23)
CALCIUM SERPL-MCNC: 9.5 MG/DL (ref 8.7–10.5)
CHLORIDE SERPL-SCNC: 106 MMOL/L (ref 95–110)
CO2 SERPL-SCNC: 30 MMOL/L (ref 23–29)
CREAT SERPL-MCNC: 1.4 MG/DL (ref 0.5–1.4)
DIFFERENTIAL METHOD BLD: ABNORMAL
EOSINOPHIL # BLD AUTO: 0.3 K/UL (ref 0–0.5)
EOSINOPHIL NFR BLD: 5.3 % (ref 0–8)
ERYTHROCYTE [DISTWIDTH] IN BLOOD BY AUTOMATED COUNT: 16.7 % (ref 11.5–14.5)
EST. GFR  (NO RACE VARIABLE): 38.5 ML/MIN/1.73 M^2
GLUCOSE SERPL-MCNC: 87 MG/DL (ref 70–110)
HCT VFR BLD AUTO: 34.6 % (ref 37–48.5)
HGB BLD-MCNC: 12.1 G/DL (ref 12–16)
IMM GRANULOCYTES # BLD AUTO: 0.02 K/UL (ref 0–0.04)
IMM GRANULOCYTES NFR BLD AUTO: 0.3 % (ref 0–0.5)
LYMPHOCYTES # BLD AUTO: 1.6 K/UL (ref 1–4.8)
LYMPHOCYTES NFR BLD: 26.7 % (ref 18–48)
MAGNESIUM SERPL-MCNC: 2.2 MG/DL (ref 1.6–2.6)
MCH RBC QN AUTO: 30.9 PG (ref 27–31)
MCHC RBC AUTO-ENTMCNC: 35 G/DL (ref 32–36)
MCV RBC AUTO: 88 FL (ref 82–98)
MONOCYTES # BLD AUTO: 0.6 K/UL (ref 0.3–1)
MONOCYTES NFR BLD: 9.8 % (ref 4–15)
NEUTROPHILS # BLD AUTO: 3.4 K/UL (ref 1.8–7.7)
NEUTROPHILS NFR BLD: 56.9 % (ref 38–73)
NRBC BLD-RTO: 0 /100 WBC
PLATELET # BLD AUTO: 196 K/UL (ref 150–450)
PMV BLD AUTO: 10.8 FL (ref 9.2–12.9)
POTASSIUM SERPL-SCNC: 3.9 MMOL/L (ref 3.5–5.1)
PROT SERPL-MCNC: 7.1 G/DL (ref 6–8.4)
RBC # BLD AUTO: 3.92 M/UL (ref 4–5.4)
SODIUM SERPL-SCNC: 138 MMOL/L (ref 136–145)
WBC # BLD AUTO: 6.04 K/UL (ref 3.9–12.7)

## 2024-03-11 PROCEDURE — 85025 COMPLETE CBC W/AUTO DIFF WBC: CPT

## 2024-03-11 PROCEDURE — 3E02340 INTRODUCTION OF INFLUENZA VACCINE INTO MUSCLE, PERCUTANEOUS APPROACH: ICD-10-PCS | Performed by: INTERNAL MEDICINE

## 2024-03-11 PROCEDURE — 25000003 PHARM REV CODE 250

## 2024-03-11 PROCEDURE — G0008 ADMIN INFLUENZA VIRUS VAC: HCPCS | Performed by: INTERNAL MEDICINE

## 2024-03-11 PROCEDURE — 25000003 PHARM REV CODE 250: Performed by: INTERNAL MEDICINE

## 2024-03-11 PROCEDURE — 80053 COMPREHEN METABOLIC PANEL: CPT

## 2024-03-11 PROCEDURE — 36415 COLL VENOUS BLD VENIPUNCTURE: CPT

## 2024-03-11 PROCEDURE — 90694 VACC AIIV4 NO PRSRV 0.5ML IM: CPT | Performed by: INTERNAL MEDICINE

## 2024-03-11 PROCEDURE — 90471 IMMUNIZATION ADMIN: CPT | Performed by: INTERNAL MEDICINE

## 2024-03-11 PROCEDURE — 99900035 HC TECH TIME PER 15 MIN (STAT)

## 2024-03-11 PROCEDURE — 99900031 HC PATIENT EDUCATION (STAT)

## 2024-03-11 PROCEDURE — 97116 GAIT TRAINING THERAPY: CPT

## 2024-03-11 PROCEDURE — 27000221 HC OXYGEN, UP TO 24 HOURS

## 2024-03-11 PROCEDURE — 83735 ASSAY OF MAGNESIUM: CPT

## 2024-03-11 PROCEDURE — 63600175 PHARM REV CODE 636 W HCPCS: Performed by: INTERNAL MEDICINE

## 2024-03-11 PROCEDURE — 94761 N-INVAS EAR/PLS OXIMETRY MLT: CPT

## 2024-03-11 RX ORDER — POTASSIUM CHLORIDE 20 MEQ/1
20 TABLET, EXTENDED RELEASE ORAL DAILY
Qty: 30 TABLET | Refills: 0 | Status: SHIPPED | OUTPATIENT
Start: 2024-03-11 | End: 2024-03-11 | Stop reason: HOSPADM

## 2024-03-11 RX ORDER — LISINOPRIL 20 MG/1
20 TABLET ORAL DAILY
Qty: 90 TABLET | Refills: 3 | Status: SHIPPED | OUTPATIENT
Start: 2024-03-11 | End: 2025-03-11

## 2024-03-11 RX ORDER — ISOSORBIDE MONONITRATE 30 MG/1
30 TABLET, EXTENDED RELEASE ORAL DAILY
Qty: 30 TABLET | Refills: 11 | Status: SHIPPED | OUTPATIENT
Start: 2024-03-12 | End: 2025-03-12

## 2024-03-11 RX ORDER — METOPROLOL SUCCINATE 50 MG/1
50 TABLET, EXTENDED RELEASE ORAL DAILY
Qty: 30 TABLET | Refills: 11 | Status: SHIPPED | OUTPATIENT
Start: 2024-03-12 | End: 2024-06-15

## 2024-03-11 RX ORDER — SPIRONOLACTONE 25 MG/1
25 TABLET ORAL DAILY
Qty: 30 TABLET | Refills: 11 | Status: SHIPPED | OUTPATIENT
Start: 2024-03-12 | End: 2025-03-12

## 2024-03-11 RX ORDER — AMIODARONE HYDROCHLORIDE 200 MG/1
200 TABLET ORAL DAILY
Qty: 30 TABLET | Refills: 11 | Status: SHIPPED | OUTPATIENT
Start: 2024-03-12 | End: 2025-03-12

## 2024-03-11 RX ORDER — FUROSEMIDE 40 MG/1
40 TABLET ORAL 2 TIMES DAILY
Qty: 60 TABLET | Refills: 11 | Status: SHIPPED | OUTPATIENT
Start: 2024-03-11 | End: 2025-03-11

## 2024-03-11 RX ADMIN — ISOSORBIDE MONONITRATE 30 MG: 30 TABLET, EXTENDED RELEASE ORAL at 09:03

## 2024-03-11 RX ADMIN — INFLUENZA A VIRUS A/VICTORIA/4897/2022 IVR-238 (H1N1) ANTIGEN (FORMALDEHYDE INACTIVATED), INFLUENZA A VIRUS A/DARWIN/6/2021 IVR-227 (H3N2) ANTIGEN (FORMALDEHYDE INACTIVATED), INFLUENZA B VIRUS B/AUSTRIA/1359417/2021 BVR-26 ANTIGEN (FORMALDEHYDE INACTIVATED), INFLUENZA B VIRUS B/PHUKET/3073/2013 BVR-1B ANTIGEN (FORMALDEHYDE INACTIVATED) 0.5 ML: 15; 15; 15; 15 INJECTION, SUSPENSION INTRAMUSCULAR at 10:03

## 2024-03-11 RX ADMIN — FUROSEMIDE 40 MG: 40 TABLET ORAL at 09:03

## 2024-03-11 RX ADMIN — POTASSIUM CHLORIDE 20 MEQ: 1500 TABLET, EXTENDED RELEASE ORAL at 09:03

## 2024-03-11 RX ADMIN — DAPAGLIFLOZIN 10 MG: 10 TABLET, FILM COATED ORAL at 09:03

## 2024-03-11 RX ADMIN — APIXABAN 5 MG: 5 TABLET, FILM COATED ORAL at 09:03

## 2024-03-11 RX ADMIN — METOPROLOL SUCCINATE 50 MG: 50 TABLET, EXTENDED RELEASE ORAL at 09:03

## 2024-03-11 RX ADMIN — SPIRONOLACTONE 25 MG: 25 TABLET ORAL at 09:03

## 2024-03-11 RX ADMIN — AMIODARONE HYDROCHLORIDE 200 MG: 200 TABLET ORAL at 09:03

## 2024-03-11 NOTE — PLAN OF CARE
03/11/24 1107   Post-Acute Status   Post-Acute Authorization E   E Status Referrals Sent     Referral sent for rolling walker to [facility]. Awaiting clinical review.

## 2024-03-11 NOTE — ASSESSMENT & PLAN NOTE
Patient noted with HFrEF (35%) per report from Dr. Real.  Patient admitted for diuresis, close monitoring.  Starting patient on continuous Lasix infusion.  Resuming Jardiance.  Strict I&Os, 1500 mL fluid restriction.  Daily weights.    3/8 GT:  Patient is diuresing well.  Pharmacy did not have Jardiance so patient was transitioned to Farxiga.  Continue with fluid restriction, strict I&Os, Lasix infusion.  Dr. Real is following.  3/9 FM:  Converting to PO lasix, home soon.  3/10 FM:  Improved, continue diuresis and MM.  3/11 GT:  Significant diuresis noted since admission.  Patient will be discharged home today.  Follow up with me in clinic in one-week, cardiology in one-week.  Patient instructed to call the clinic with any questions or concerns she may have prior to follow up.

## 2024-03-11 NOTE — PLAN OF CARE
03/11/24 1141   Final Note   Assessment Type Final Discharge Note   Anticipated Discharge Disposition Home-Health   Post-Acute Status   Post-Acute Authorization Home Health   Home Health Status Set-up Complete/Auth obtained   Discharge Delays None known at this time      received confirmation from  Nursing Care that they will provide services to patient beginning tomorrow.

## 2024-03-11 NOTE — PLAN OF CARE
Plan of care reviewed and ongoing with patient. 20 gauge IV to R AC saline locked, received IV Lasix during the night tolerated well. 1 L NC tolerated well, tele electrodes and purewick connections intact. Patient did not complain of chest pain or chest discomfort during the night. Expressed readiness to transition care to home setting. Call light and personal items within reach.       Problem: Adult Inpatient Plan of Care  Goal: Plan of Care Review  Outcome: Ongoing, Progressing  Goal: Absence of Hospital-Acquired Illness or Injury  Outcome: Ongoing, Progressing  Goal: Optimal Comfort and Wellbeing  Outcome: Ongoing, Progressing  Goal: Readiness for Transition of Care  Outcome: Ongoing, Progressing     Problem: Diabetes Comorbidity  Goal: Blood Glucose Level Within Targeted Range  Outcome: Ongoing, Progressing     Problem: Skin Injury Risk Increased  Goal: Skin Health and Integrity  Outcome: Ongoing, Progressing     Problem: Fall Injury Risk  Goal: Absence of Fall and Fall-Related Injury  Outcome: Ongoing, Progressing     Problem: Adult Inpatient Plan of Care  Goal: Patient-Specific Goal (Individualized)  Outcome: Ongoing, Not Progressing

## 2024-03-11 NOTE — PLAN OF CARE
Problem: Physical Therapy  Goal: Physical Therapy Goal  Description: Goals to be met by: 3/15/2024     Patient will increase functional independence with mobility by performin. Supine to sit with Modified Independent. (MET 3/11/2024)  2. Sit to supine with Modified Independent. (MET 3/11/2024)  3. Bed to chair transfer with Supervision or Set-up Assistance with rolling walker using Step Transfer technique. (MET 3/11/2024)  4. Sit to Stand with Supervision or Set-up Assistance with rolling walker. (MET 3/11/2024)  5. Gait  x 150  feet with Supervision or Set-up Assistance with RW and O2 supplement..(NOT MET 3/11/2024)  6. Lower extremity exercise program x10 reps. (MET 3/11/2024)     Outcome: Partially Met, Discharged Today

## 2024-03-11 NOTE — NURSING
Patient being discharged. Discharge instruction given, IV and Tele removed. Flu vaccine administered. Vitor, nursing care notified. Awaiting patient transportation.

## 2024-03-11 NOTE — PLAN OF CARE
03/11/24 0910   Medicare Message   Important Message from Medicare regarding Discharge Appeal Rights Given to patient/caregiver;Explained to patient/caregiver;Signed/date by patient/caregiver   Date IMM was signed 03/11/24   Time IMM was signed 0830

## 2024-03-11 NOTE — DISCHARGE SUMMARY
[facility] Medicine  Discharge Summary      Patient Name: Edyta Gomez  MRN: [id number]  INA: [id number]  Patient Class: IP- Inpatient  Admission Date: 3/7/2024  Hospital Length of Stay: 3 days  Discharge Date and Time:  03/11/2024 9:23 AM  Attending Physician: Amanda Guzman MD   Discharging Provider: JANET TA  Primary Care Provider: Amanda Guzman MD    Primary Care Team: Networked reference to record PCT     HPI:   ED HPI: 79-year-old female history of asthma, diabetes, hypertension, CHF presents to ED sent by cardiologist for CHF exacerbation.  Patient reports orthopnea and dyspnea on exertion worsening in nature for the past several days.  Has been taking her Lasix 20 mg twice a day.  Denies any chest pain.  Also endorses bilateral lower extremity swelling.     IM HPI:  79-year-old  female with a past medical history of arthritis, asthma, cataracts, type 2 diabetes, hypertension, CHF presented to the emergency department today from Dr. Real's office for acute CHF exacerbation.  Patient noted with an EF of 30 as well as rhythm change on EKG.  Patient was supposed to establish care in the clinic with me this afternoon, and had a follow up with Dr. Real this morning from her emergency department visit.  Patient reports she was previously established with ENRIKE Guaman at Dr. Muna Boswell's office, but reports she was not aware the clinic closed and when she attempted to call to make an appointment, there was no answer.  Patient reports she was evaluated in the emergency department on 02/23/2024 for shortness of breath as well as lower extremity edema.  At that time patient's chest x-ray was clear other than cardiomegaly, Dr. Real was consulted, and he recommended IV Lasix in the emergency department as well as increasing her Lasix in an outpatient setting and follow up this week.  Patient reports she has home oxygen that has been set up for quite  some time, but she has not had to use it, but does endorse using it this week due to worsening dyspnea at rest as well as exertional dyspnea.  Patient reports 3 pillow orthopnea.  Patient denies any chest pain.  Chest x-ray in the emergency department demonstrates cardiomegaly and right basilar infiltrate or atelectasis and right pleural effusion.  Cannot exclude adenopathy on the right consider CT of the chest with IV contrast.  Patient's creatinine at this time is 1.5, we will need to monitor to determine if patient can tolerate IV contrast.  BNP 2018 which is increased from 1483 from previous emergency department visit.  POCT COVID/flu negative.  Patient given IV Lasix injection in the emergency department, is maintaining oxygen saturation on room air.  Patient admitted for further diuresis and monitoring.  We will start patient on continuous IV Lasix infusion.  Consulting Dr. Real to follow patient during hospitalization.    * No surgery found *      Hospital Course:   3/8 GT:  Patient is doing well this morning.  Patient noted with a mild hypokalemia due to continuous Lasix infusion, we will increase potassium to 20 mEq b.i.d. and continue to monitor with daily labs.  Patient is diuresing well, edema to bilateral lower extremities significantly improved from yesterday.  Repeat chest x-ray this morning demonstrates some improvement in pulmonary edema, awaiting official radiology read.  Patient reports she is still having some shortness of breath, but reports it is improving.  Patient is on continuous supplemental oxygen.  We will add DuoNebs to patient's medication regimen.  3/9 FM:  Patient much improved, less dyspnea less work of breathing resting comfortably this morning.  I am discontinuing her Lasix drip and converting to p.o. Lasix patient is on an excellent heart failure medical regimen we will monitor her through the day today and likely discharge later this afternoon or in the morning.  3/10 FM:   Patient last night had an episode of chest pain.  Patient's pain was across her chest lasted for several minutes resolve with Tylenol.  Patient and I discussed discharge plan and she would prefer to stay another night and speak with her cardiologist tomorrow.  We will change her Lasix back to IV she still has peripheral edema she is still requiring oxygen will have the nursing staff wean her O2.  3/11 GT:  Patient is doing well and has diuresed significantly since admit.  Patient meds has been reviewed and she will be discharged home on previous medication regimen.  Patient needs to follow up with me in clinic in one-week as well as Cardiology.  Patient will be discharged home with home health.  Orders for requested DME entered.  Patient has home oxygen.  Strict return precautions given.  Patient verbalized understanding.     Goals of Care Treatment Preferences:  Code Status: Full Code      Consults:   Consults (From admission, onward)          Status Ordering Provider     Inpatient consult to Social Work/Case Management  Once        Provider:  (Not yet assigned)    Ordered AVRIL SIMMS     Inpatient consult to Cardiology  Once        Provider:  Celestino Real MD    Completed NORIS GALVEZ            Cardiac/Vascular  * Heart failure with reduced ejection fraction  Patient noted with HFrEF (35%) per report from Dr. Real.  Patient admitted for diuresis, close monitoring.  Starting patient on continuous Lasix infusion.  Resuming Jardiance.  Strict I&Os, 1500 mL fluid restriction.  Daily weights.    3/8 GT:  Patient is diuresing well.  Pharmacy did not have Jardiance so patient was transitioned to Farxiga.  Continue with fluid restriction, strict I&Os, Lasix infusion.  Dr. Real is following.  3/9 FM:  Converting to PO lasix, home soon.  3/10 FM:  Improved, continue diuresis and MM.  3/11 GT:  Significant diuresis noted since admission.  Patient will be discharged home today.  Follow up with me in clinic in  one-week, cardiology in one-week.  Patient instructed to call the clinic with any questions or concerns she may have prior to follow up.    Chest pain    3/10 FM:  Check troponin, would like to see cards in am.    3/11 GT:  Troponins negative.  Cardiology evaluated yesterday.  No more chest pain reported.    Atrial fibrillation  Patient with Paroxysmal (<7 days) atrial fibrillation which is controlled currently with Beta Blocker. Patient is currently in atrial fibrillation.[id number] Score: 4. Anticoagulation indicated. Anticoagulation done with Eliquis 5 mg b.i.d. .    3/11 GT:  Continue Eliquis, BB, amiodarone.    Hypertension  Chronic, controlled. Latest blood pressure and vitals reviewed-     Temp:  [97.7 °F (36.5 °C)-98.4 °F (36.9 °C)]   Pulse:  [66-91]   Resp:  [18-20]   BP: ()/(58-86)   SpO2:  [95 %-99 %] .   Home meds for hypertension were reviewed and noted below.   Hypertension Medications               amlodipine (NORVASC) 10 MG tablet Take 1 tablet (10 mg total) by mouth once daily.    carvedilol (COREG) 12.5 MG tablet Take 1 tablet (12.5 mg total) by mouth once daily.    furosemide (LASIX) 20 MG tablet Take 20 mg by mouth.    furosemide (LASIX) 20 MG tablet Take 1 tablet (20 mg total) by mouth every 8 (eight) hours.    isosorbide mononitrate (IMDUR) 30 MG 24 hr tablet Take 30 mg by mouth.    lisinopril-hydrochlorothiazide (PRINZIDE,ZESTORETIC) 20-25 mg Tab Take 1 tablet by mouth once daily.    metoprolol succinate (TOPROL-XL) 50 MG 24 hr tablet Take 50 mg by mouth.            Resuming metoprolol and starting patient on IV Lasix infusion    Hyperlipidemia  Continue home statin.      Endocrine  Type 2 diabetes mellitus, without long-term current use of insulin  Obtaining hemoglobin A1c today.  Sliding scale insulin.    3/8 GT:  Hemoglobin A1c 5.5.      Final Active Diagnoses:    Diagnosis Date Noted POA    PRINCIPAL PROBLEM:  Heart failure with reduced ejection fraction [I50.20] 03/07/2024 Yes     Chest pain [R07.9] 03/10/2024 No    Atrial fibrillation [I48.91] 03/08/2024 Yes    Type 2 diabetes mellitus, without long-term current use of insulin [E11.9] 03/07/2024 Yes    Hyperlipidemia [E78.5] 11/10/2014 Yes    Hypertension [I10] 11/10/2014 Yes      Problems Resolved During this Admission:    Diagnosis Date Noted Date Resolved POA    Impaired fasting glucose [R73.01] 11/10/2014 03/07/2024 Yes       Discharged Condition: stable    Disposition: Home-Health Care Svc    Follow Up:   Contact information for follow-up providers       Kayley Mcclelland APRN Follow up in 1 week(s).    Specialty: Family Medicine  Contact information:  [address] [place] [address]  [phone]               Celestino Real MD Follow up in 1 week(s).    Specialty: Cardiology  Contact information:  [address] [facility] [address]  [place] [address]  [phone]                       Contact information for after-discharge care       Home Medical Care       [facility] .    Services: Home Nursing, Home Rehabilitation  Contact information:  [address]  [place] [address]  [phone]                                 Patient Instructions:      Ambulatory referral/consult to Home Health   Standing Status: Future   Referral Priority: Routine Referral Type: Home Health   Referral Reason: Specialty Services Required   Requested Specialty: Home Health Services   Number of Visits Requested: 1     Diet Cardiac       Significant Diagnostic Studies: Labs: CMP   Recent Labs   Lab 03/10/24  0511 03/11/24  0531    138   K 3.7 3.9    106   CO2 29 30*   GLU 85 87   BUN 14 16   CREATININE 1.3 1.4   CALCIUM 9.3 9.5   PROT 7.4 7.1   ALBUMIN 3.1* 3.1*   BILITOT 0.8 0.7   ALKPHOS 137* 145*   AST 16 19   ALT 14 14   ANIONGAP 6 2*    and CBC   Recent Labs   Lab 03/10/24  0511 03/11/24  0531   WBC 7.64 6.04   HGB 12.8 12.1   HCT 37.3 34.6*    196       Pending Diagnostic  Studies:       None           Medications:  Reconciled Home Medications:      Medication List        START taking these medications      amiodarone 200 MG Tab  Commonly known as: PACERONE  Take 1 tablet (200 mg total) by mouth once daily.  Start taking on: March 12, 2024     lisinopriL 20 MG tablet  Commonly known as: PRINIVIL,ZESTRIL  Take 1 tablet (20 mg total) by mouth once daily.     spironolactone 25 MG tablet  Commonly known as: ALDACTONE  Take 1 tablet (25 mg total) by mouth once daily.  Start taking on: March 12, 2024            CHANGE how you take these medications      furosemide 40 MG tablet  Commonly known as: LASIX  Take 1 tablet (40 mg total) by mouth 2 (two) times daily.  What changed:   medication strength  how much to take  when to take this  Another medication with the same name was removed. Continue taking this medication, and follow the directions you see here.     isosorbide mononitrate 30 MG 24 hr tablet  Commonly known as: IMDUR  Take 1 tablet (30 mg total) by mouth once daily.  Start taking on: March 12, 2024  What changed: when to take this     metoprolol succinate 50 MG 24 hr tablet  Commonly known as: TOPROL-XL  Take 1 tablet (50 mg total) by mouth once daily.  Start taking on: March 12, 2024  What changed: when to take this            CONTINUE taking these medications      aspirin 81 MG EC tablet  Commonly known as: ECOTRIN  Take 81 mg by mouth once daily.     ELIQUIS 5 mg Tab  Generic drug: apixaban  Take 5 mg by mouth 2 (two) times daily.     JARDIANCE 10 mg tablet  Generic drug: empagliflozin  Take 10 mg by mouth once daily.     lovastatin 20 MG tablet  Commonly known as: MEVACOR  Take 1 tablet (20 mg total) by mouth every evening.     metFORMIN 500 MG tablet  Commonly known as: GLUCOPHAGE  Take 2 tablets (1,000 mg total) by mouth daily with breakfast.     niacin 250 MG Tab  Take 250 mg by mouth nightly.     ondansetron 4 MG tablet  Commonly known as: ZOFRAN  Take 1 tablet (4 mg total)  by mouth every 6 (six) hours.     oxybutynin 5 MG Tab  Commonly known as: DITROPAN  Take 1 tablet (5 mg total) by mouth 2 (two) times daily.     VITAMIN D3 1000 units Tab  Generic drug: vitamin D  Take 185 mg by mouth once daily.            STOP taking these medications      amLODIPine 10 MG tablet  Commonly known as: NORVASC     carvediloL 12.5 MG tablet  Commonly known as: COREG     lisinopriL-hydrochlorothiazide 20-25 mg Tab  Commonly known as: KIRTI LINDO              Indwelling Lines/Drains at time of discharge:   Lines/Drains/Airways       Drain  Duration             Female External Urinary Catheter w/ Suction 03/07/24 1910 3 days                    Time spent on the discharge of patient: 35 minutes         JANET TA  Department of Hospital Medicine  [facility]

## 2024-03-11 NOTE — ASSESSMENT & PLAN NOTE
3/10 FM:  Check troponin, would like to see cards in am.    3/11 GT:  Troponins negative.  Cardiology evaluated yesterday.  No more chest pain reported.

## 2024-03-11 NOTE — PROGRESS NOTES
"  STEPHANIE Real MD  [address] [place] [address],  [address]  [place] [address]  Phone:  [phone]  Fax:  [phone]  Email: [email or web address]  _______________________________________________________________      Today's Date:  3/10/2024  Patient Name: Edyta Gomez    MRN: [id number]    Code Status: Full Code     Attending Physician: Amanda Guzman MD   Consulting Physician: STEPHANIE Real MD    Hospital Course:  Continues to make clinical progress; stating that she is feeling better today I.e., decreased shortness of breath and sleeping better.   Earlier she had a brief episode of chest discomfort described  "unusual feeling across right and middle of ches" that lasted several minutes.   She was given tylenol without recurrence.    Reviewed labs and clinical data with patient:  very pleased with progressed and has noted a significicant decrease in LE edema.      Vital Signs (Most Recent):  Temp: 97.9 °F (36.6 °C) (03/10/24 1932)  Pulse: 91 (03/10/24 1932)  Resp: 20 (03/10/24 1932)  BP: 106/64 (03/10/24 1932)  SpO2: 96 % (03/10/24 1932) Vital Signs (24h Range):  Temp:  [97.7 °F (36.5 °C)-98.5 °F (36.9 °C)] 97.9 °F (36.6 °C)  Pulse:  [54-91] 91  Resp:  [18-20] 20  SpO2:  [90 %-99 %] 96 %  BP: ()/(58-73) 106/64     Weight: 86.9 kg (191 lb 9.6 oz)  Body mass index is 30.01 kg/m².    SpO2: 96 %         Intake/Output Summary (Last 24 hours) at 3/10/2024 5654  Last data filed at 3/10/2024 1815  Gross per 24 hour   Intake 1500 ml   Output 2200 ml   Net -700 ml       Labs:  CMP   Recent Labs   Lab 03/09/24  0521 03/10/24  0511    138   K 3.4* 3.7    103   CO2 34* 29   GLU 90 85   BUN 11 14   CREATININE 1.3 1.3   CALCIUM 9.7 9.3   PROT 7.2 7.4   ALBUMIN 3.1* 3.1*   BILITOT 0.8 0.8   ALKPHOS 134 137*   AST 14 16   ALT 12 14   ANIONGAP 1* 6      and CBC   Recent Labs   Lab 03/09/24  0521 03/10/24  0511   WBC 6.75 7.64   HGB 12.4 12.8   HCT 36.8* 37.3    188         Physical " Examination:  General:   Alert and oriented.  No distress  Heent:  JVD of 5 cm.  Lungs:   Diminished breath sounds.  No wheezing.  Heart:   Irregular regular rhythm; rate within normal limits.  Extremities:   1-2+ bilateral edema.  Assessment and Plan:     1.  Congestive Heart Failure:   Secondary to reduced ejection fraction.  There has been an interval decline in LV function.  In addition, she has moderate to severe tricuspid regurgitation.  She has responded well to diuretic therapy.  Furosemide:  change to 40 mg bid  K+ has normalized.    2.  Pulmonary Hypertension: Patient on optimal therapy.   Continue to monitor.     3.  Valvular insufficiency: The patient has moderate to severe tricuspid regurgitation secondary to pulmonary hypertension.   Continue with Isosorbide Mononitrate 30 mg daily.  Diuretic therapy.  Beta-blocker therapy     4.  Persistent Atrial Fibrillation:  during her last visit, she was in sinus rhythm.  She is now back in atrial fibrillation.  Continue to monitor.  Eliquis therapy 5 mg twice daily  Pacerone therapy at maintenance dose.     5.  Coronary Artery Disease: The cardiac CTA reveals 70% LAD stenosis.  The smaller vessels are not well visualized.   (Nuclear SPECT imaging was negative for ischemia.)   I recommend the patient continue with Aspirin 81 mg daily, Isosorbide Mononitrate, and beta-blocker therapy as she remains asymptomatic.     Continue with conservative management.     6.  Hypertension: Blood pressure remains well-controlled.    Continue current therapy.     7. Hyperlipidemia: Continue with Lovastatin therapy.   Lipids are excellently controlled.    Continue with  statin therapy.     8. Diabetes Mellitus: Will defer to her primary care physician.     Diabetes is well controlled.

## 2024-03-11 NOTE — ASSESSMENT & PLAN NOTE
Patient with Paroxysmal (<7 days) atrial fibrillation which is controlled currently with Beta Blocker. Patient is currently in atrial fibrillation.[id number] Score: 4. Anticoagulation indicated. Anticoagulation done with Eliquis 5 mg b.i.d. .    3/11 GT:  Continue Eliquis, BB, amiodarone.

## 2024-03-11 NOTE — ASSESSMENT & PLAN NOTE
Chronic, controlled. Latest blood pressure and vitals reviewed-     Temp:  [97.7 °F (36.5 °C)-98.4 °F (36.9 °C)]   Pulse:  [66-91]   Resp:  [18-20]   BP: ()/(58-86)   SpO2:  [95 %-99 %] .   Home meds for hypertension were reviewed and noted below.   Hypertension Medications               amlodipine (NORVASC) 10 MG tablet Take 1 tablet (10 mg total) by mouth once daily.    carvedilol (COREG) 12.5 MG tablet Take 1 tablet (12.5 mg total) by mouth once daily.    furosemide (LASIX) 20 MG tablet Take 20 mg by mouth.    furosemide (LASIX) 20 MG tablet Take 1 tablet (20 mg total) by mouth every 8 (eight) hours.    isosorbide mononitrate (IMDUR) 30 MG 24 hr tablet Take 30 mg by mouth.    lisinopril-hydrochlorothiazide (PRINZIDE,ZESTORETIC) 20-25 mg Tab Take 1 tablet by mouth once daily.    metoprolol succinate (TOPROL-XL) 50 MG 24 hr tablet Take 50 mg by mouth.            Resuming metoprolol and starting patient on IV Lasix infusion

## 2024-03-11 NOTE — PLAN OF CARE
[facility]  Discharge Final Note    Primary Care Provider: Amanda Guzman MD    Expected Discharge Date: 3/11/2024    Final Discharge Note (most recent)       Final Note - 03/11/24 0922          Final Note    Assessment Type Final Discharge Note     Anticipated Discharge Disposition Home-Health Care Svc        Post-Acute Status    Post-Acute Authorization Home Health     Home Health Status Set-up Complete/Auth obtained     Coverage [employer] MANAGED MEDICARE - [employer] MED[employer]ARE PLAN PPO     Discharge Delays None known at this time                     Important Message from Medicare  Important Message from Medicare regarding Discharge Appeal Rights: Given to patient/caregiver, Explained to patient/caregiver, Signed/date by patient/caregiver     Date IMM was signed: 03/11/24  Time IMM was signed: 0830     Follow-up providers       Kayley Mcclelland APRN   Specialty: Family Medicine    [address] [facility] [address]   Phone: [phone]       Next Steps: Follow up in 1 week(s)    Celestino Real MD   Specialty: Cardiology    [address] [facility] [address]  [facility] [address]   Phone: [phone]       Next Steps: Follow up in 1 week(s)              After-discharge care                Home Medical Care       [facility]   Service: Home Nursing, Home Rehabilitation    [address] [facility] [address]   Phone: [phone]                           Final note is completed. The patient will be discharging home with home health services. Nursing Care has accepted this patient.

## 2024-03-11 NOTE — PT/OT/SLP PROGRESS
"Physical Therapy Treatment    Patient Name:  Edyta Gomez   MRN:  [id number]    Recommendations:     Discharge Recommendations: Low Intensity Therapy  Discharge Equipment Recommendations: walker, rolling  Barriers to discharge: None    Assessment:     Edyta Gomez is a 79 y.o. female admitted with a medical diagnosis of Heart failure with reduced ejection fraction.  She presents with the following impairments/functional limitations: weakness, impaired balance, gait instability, decreased lower extremity function, decreased safety awareness. These limitations are causing decreased functional mobility and independence.     Patient was supine with HOB elevated upon entry to room. Vitals prior to treatment were: BP = 106/75 mmHg, HR = 80 bpm, SpO2 = 94%. She is independent with bed mobility and transfers. She ambulated for 12 feet with no AD, as she stated this was her PLOF. She was reaching with BUEs to grab onto various items for balance because she stated this is how she walks at home. She then walked ~70 ft with a RW and SBA-CGA with one episode of LOB that she was able to self-correct. Recommending patient start using her standard walker that she owns at home instead of grabbing onto furniture for support until she gets her RW that is being ordered. Patient discharged today to home with home health.     Rehab Prognosis: Fair; patient would benefit from acute skilled PT services to address these deficits and reach maximum level of function.    Recent Surgery: * No surgery found *      Plan:     During this hospitalization, patient to be seen 5 x/week to address the identified rehab impairments via gait training, therapeutic activities, therapeutic exercises, neuromuscular re-education and progress toward the following goals:    Plan of Care Expires:  03/15/24    Subjective     Chief Complaint: "Im ready to go home now"  Patient/Family Comments/goals: "someone is going to be by to pick me up " "soon"  Pain/Comfort:  Pain Rating 1: 0/10      Objective:     Communicated with patient and nurse prior to session.  Patient found supine with PureWick, peripheral IV upon PT entry to room.     General Precautions: Standard, fall, respiratory  Orthopedic Precautions: N/A  Braces: N/A  Respiratory Status: Room air     Functional Mobility:  Bed Mobility:     Rolling Right: independence  Scooting: independence  Bridging: independence  Supine to Sit: independence  Transfers:     Sit to Stand:  independence with no AD  Gait: 12 ft with no AD and unsteady gait, 70 ft with RW and SBA-CGA  Balance: standing balance during ambulation is poor and patient requires an AD; seated balance good      AM-PAC 6 CLICK MOBILITY  Turning over in bed (including adjusting bedclothes, sheets and blankets)?: 4  Sitting down on and standing up from a chair with arms (e.g., wheelchair, bedside commode, etc.): 4  Moving from lying on back to sitting on the side of the bed?: 4  Moving to and from a bed to a chair (including a wheelchair)?: 4  Need to walk in hospital room?: 3  Climbing 3-5 steps with a railing?: 3 (clinical judgment)  Basic Mobility Total Score: 22       Treatment & Education:  Gait  Bed mobility  Transfers  Patient educated on role of acute care PT, POC, importance of OOB mobility, transfer safety, proper AD use, balance impairments, and call bell usage.      Patient left sitting edge of bed with all lines intact, call button in reach, and nurse notified..    GOALS:   Multidisciplinary Problems       Physical Therapy Goals       Not on file              Multidisciplinary Problems (Resolved)          Problem: Physical Therapy    Goal Priority Disciplines Outcome Goal Variances Interventions   Physical Therapy Goal   (Resolved)     PT, PT/OT Met     Description: Goals to be met by: 3/15/2024     Patient will increase functional independence with mobility by performin. Supine to sit with Modified Independent. (MET " 3/11/2024)  2. Sit to supine with Modified Independent. (MET 3/11/2024)  3. Bed to chair transfer with Supervision or Set-up Assistance with rolling walker using Step Transfer technique. (MET 3/11/2024)  4. Sit to Stand with Supervision or Set-up Assistance with rolling walker. (MET 3/11/2024)  5. Gait  x 150  feet with Supervision or Set-up Assistance with RW and O2 supplement..(NOT MET 3/11/2024)  6. Lower extremity exercise program x10 reps. (MET 3/11/2024)                          Time Tracking:     PT Received On: 03/11/24  PT Start Time: 1025     PT Stop Time: 1041  PT Total Time (min): 16 min     Billable Minutes: Gait Training 16    Treatment Type: Treatment  PT/PTA: PT     Number of PTA visits since last PT visit: 0     03/11/2024

## 2024-03-11 NOTE — PT/OT/SLP DISCHARGE
Physical Therapy Discharge Summary    Name: Edyta Gomez  MRN: [id number]   Principal Problem: Heart failure with reduced ejection fraction     Patient Discharged from acute Physical Therapy on 3/11/2024.  Please refer to prior PT note dated on 3/11/2024 for functional status.     Assessment:     Goals partially met. Recommending RW. Patient ambulated ~70 ft with RW today. Referral for DME sent to [facility] and awaiting clinical review per . Patient discharged to home with home health.     Objective:     GOALS:   Multidisciplinary Problems       Physical Therapy Goals       Not on file              Multidisciplinary Problems (Resolved)          Problem: Physical Therapy    Goal Priority Disciplines Outcome Goal Variances Interventions   Physical Therapy Goal   (Resolved)     PT, PT/OT Met     Description: Goals to be met by: 3/15/2024     Patient will increase functional independence with mobility by performin. Supine to sit with Modified Independent. (MET 3/11/2024)  2. Sit to supine with Modified Independent. (MET 3/11/2024)  3. Bed to chair transfer with Supervision or Set-up Assistance with rolling walker using Step Transfer technique. (MET 3/11/2024)  4. Sit to Stand with Supervision or Set-up Assistance with rolling walker. (MET 3/11/2024)  5. Gait  x 150  feet with Supervision or Set-up Assistance with RW and O2 supplement..(NOT MET 3/11/2024)  6. Lower extremity exercise program x10 reps. (MET 3/11/2024)                          Reasons for Discontinuation of Therapy Services  Transfer to alternate level of care.      Plan:     Patient Discharged to: Home with Home Health Service.      3/11/2024

## 2024-05-22 ENCOUNTER — HOSPITAL ENCOUNTER (EMERGENCY)
Facility: HOSPITAL | Age: 79
Discharge: HOME | End: 2024-05-22
Attending: EMERGENCY MEDICINE
Payer: MEDICARE

## 2024-05-22 VITALS
WEIGHT: 175 LBS | SYSTOLIC BLOOD PRESSURE: 119 MMHG | RESPIRATION RATE: 16 BRPM | OXYGEN SATURATION: 100 % | BODY MASS INDEX: 27.47 KG/M2 | TEMPERATURE: 97 F | DIASTOLIC BLOOD PRESSURE: 71 MMHG | HEIGHT: 67 IN | HEART RATE: 70 BPM

## 2024-05-22 DIAGNOSIS — E86.0 DEHYDRATION: Primary | ICD-10-CM

## 2024-05-22 DIAGNOSIS — K40.90 UNILATERAL INGUINAL HERNIA WITHOUT OBSTRUCTION OR GANGRENE, RECURRENCE NOT SPECIFIED: ICD-10-CM

## 2024-05-22 DIAGNOSIS — K44.9 HIATAL HERNIA: ICD-10-CM

## 2024-05-22 LAB
ALBUMIN SERPL BCP-MCNC: 3.4 G/DL (ref 3.5–5.2)
ALP SERPL-CCNC: 164 U/L (ref 55–135)
ALT SERPL W/O P-5'-P-CCNC: 20 U/L (ref 10–44)
ANION GAP SERPL CALC-SCNC: 5 MMOL/L (ref 3–11)
AST SERPL-CCNC: 22 U/L (ref 10–40)
BASOPHILS # BLD AUTO: 0.07 K/UL (ref 0–0.2)
BASOPHILS NFR BLD: 0.8 % (ref 0–1.9)
BILIRUB SERPL-MCNC: 1.3 MG/DL (ref 0.1–1)
BILIRUB UR QL STRIP: NEGATIVE
BUN SERPL-MCNC: 28 MG/DL (ref 8–23)
CALCIUM SERPL-MCNC: 9.8 MG/DL (ref 8.7–10.5)
CHLORIDE SERPL-SCNC: 104 MMOL/L (ref 95–110)
CLARITY UR: CLEAR
CO2 SERPL-SCNC: 28 MMOL/L (ref 23–29)
COLOR UR: YELLOW
CREAT SERPL-MCNC: 2.1 MG/DL (ref 0.5–1.4)
DIFFERENTIAL METHOD BLD: ABNORMAL
EOSINOPHIL # BLD AUTO: 0.3 K/UL (ref 0–0.5)
EOSINOPHIL NFR BLD: 3.1 % (ref 0–8)
ERYTHROCYTE [DISTWIDTH] IN BLOOD BY AUTOMATED COUNT: 16.4 % (ref 11.5–14.5)
EST. GFR  (NO RACE VARIABLE): 23.7 ML/MIN/1.73 M^2
GLUCOSE SERPL-MCNC: 78 MG/DL (ref 70–110)
GLUCOSE UR QL STRIP: ABNORMAL
HCT VFR BLD AUTO: 45.2 % (ref 37–48.5)
HGB BLD-MCNC: 15.4 G/DL (ref 12–16)
HGB UR QL STRIP: NEGATIVE
IMM GRANULOCYTES # BLD AUTO: 0.02 K/UL (ref 0–0.04)
IMM GRANULOCYTES NFR BLD AUTO: 0.2 % (ref 0–0.5)
KETONES UR QL STRIP: NEGATIVE
LEUKOCYTE ESTERASE UR QL STRIP: NEGATIVE
LIPASE SERPL-CCNC: 35 U/L (ref 13–75)
LYMPHOCYTES # BLD AUTO: 1.6 K/UL (ref 1–4.8)
LYMPHOCYTES NFR BLD: 19.5 % (ref 18–48)
MCH RBC QN AUTO: 30.7 PG (ref 27–31)
MCHC RBC AUTO-ENTMCNC: 34.1 G/DL (ref 32–36)
MCV RBC AUTO: 90 FL (ref 82–98)
MONOCYTES # BLD AUTO: 0.9 K/UL (ref 0.3–1)
MONOCYTES NFR BLD: 11 % (ref 4–15)
NEUTROPHILS # BLD AUTO: 5.5 K/UL (ref 1.8–7.7)
NEUTROPHILS NFR BLD: 65.4 % (ref 38–73)
NITRITE UR QL STRIP: NEGATIVE
NRBC BLD-RTO: 0 /100 WBC
PH UR STRIP: 8 [PH] (ref 5–8)
PLATELET # BLD AUTO: 323 K/UL (ref 150–450)
PMV BLD AUTO: 10 FL (ref 9.2–12.9)
POTASSIUM SERPL-SCNC: 4.4 MMOL/L (ref 3.5–5.1)
PROT SERPL-MCNC: 8.4 G/DL (ref 6–8.4)
PROT UR QL STRIP: NEGATIVE
RBC # BLD AUTO: 5.01 M/UL (ref 4–5.4)
SODIUM SERPL-SCNC: 137 MMOL/L (ref 136–145)
SP GR UR STRIP: 1.01 (ref 1–1.03)
URN SPEC COLLECT METH UR: ABNORMAL
UROBILINOGEN UR STRIP-ACNC: 1 EU/DL
WBC # BLD AUTO: 8.35 K/UL (ref 3.9–12.7)

## 2024-05-22 PROCEDURE — 36415 COLL VENOUS BLD VENIPUNCTURE: CPT | Performed by: CLINICAL NURSE SPECIALIST

## 2024-05-22 PROCEDURE — 80053 COMPREHEN METABOLIC PANEL: CPT | Performed by: CLINICAL NURSE SPECIALIST

## 2024-05-22 PROCEDURE — 25000003 PHARM REV CODE 250: Performed by: CLINICAL NURSE SPECIALIST

## 2024-05-22 PROCEDURE — 99284 EMERGENCY DEPT VISIT MOD MDM: CPT | Mod: 25

## 2024-05-22 PROCEDURE — 85025 COMPLETE CBC W/AUTO DIFF WBC: CPT | Performed by: CLINICAL NURSE SPECIALIST

## 2024-05-22 PROCEDURE — 96360 HYDRATION IV INFUSION INIT: CPT

## 2024-05-22 PROCEDURE — 83690 ASSAY OF LIPASE: CPT | Performed by: CLINICAL NURSE SPECIALIST

## 2024-05-22 PROCEDURE — 81003 URINALYSIS AUTO W/O SCOPE: CPT | Performed by: CLINICAL NURSE SPECIALIST

## 2024-05-22 RX ORDER — TRAMADOL HYDROCHLORIDE 50 MG/1
50 TABLET ORAL EVERY 8 HOURS PRN
Qty: 12 TABLET | Refills: 0 | Status: SHIPPED | OUTPATIENT
Start: 2024-05-22

## 2024-05-22 RX ADMIN — SODIUM CHLORIDE 1000 ML: 9 INJECTION, SOLUTION INTRAVENOUS at 01:05

## 2024-05-22 NOTE — ED PROVIDER NOTES
Encounter Date: 5/22/2024       History     Chief Complaint   Patient presents with    Abdominal Pain     Pt stated that she has been experiencing RLQ abdominal pain for the past couple days. Denied dysuria - chronic incontinence. Denied n/v/d. Last BM 2 days ago.      79-year-old female presents to the emergency room with lower right abdominal pain for the last few days.  Last bowel movement was 2 days ago and she reports it was normal.  Denies any nausea vomiting or diarrhea.  Patient does have swelling noted to right pelvic area.        Review of patient's allergies indicates:   Allergen Reactions    Penicillins Rash    Sulfa (sulfonamide antibiotics) Rash     Past Medical History:   Diagnosis Date    Arthritis     Asthma     Cataract     BILATERAL    Diabetes mellitus, type 2     H/O: Bell's palsy     Hypertension     Wears dentures     FULL     Past Surgical History:   Procedure Laterality Date    HYSTERECTOMY      OOPHORECTOMY      right knee surgery       Family History   Problem Relation Name Age of Onset    Brain Hemorrhage Mother  33    No Known Problems Father      No Known Problems Sister      Ovarian cancer Daughter      No Known Problems Maternal Aunt      No Known Problems Maternal Uncle      No Known Problems Paternal Aunt      No Known Problems Paternal Uncle      No Known Problems Maternal Grandmother      No Known Problems Maternal Grandfather      No Known Problems Paternal Grandmother      No Known Problems Paternal Grandfather      Heart disease Brother      Heart disease Brother      Heart disease Brother      Breast cancer Neg Hx      BRCA 1/2 Neg Hx       Social History     Tobacco Use    Smoking status: Never    Smokeless tobacco: Never   Substance Use Topics    Alcohol use: No    Drug use: No     Review of Systems   Constitutional:  Negative for fever.   HENT:  Negative for sore throat.    Respiratory:  Negative for shortness of breath.    Cardiovascular:  Negative for chest pain.    Gastrointestinal:  Positive for abdominal pain. Negative for nausea.   Genitourinary:  Negative for dysuria.   Musculoskeletal:  Negative for back pain.   Skin:  Negative for rash.   Neurological:  Negative for weakness.   Hematological:  Does not bruise/bleed easily.   All other systems reviewed and are negative.      Physical Exam     Initial Vitals [05/22/24 1236]   BP Pulse Resp Temp SpO2   119/71 70 16 97.4 °F (36.3 °C) 100 %      MAP       --         Physical Exam    Nursing note and vitals reviewed.  Constitutional: She appears well-developed and well-nourished.   HENT:   Head: Normocephalic and atraumatic.   Eyes: Pupils are equal, round, and reactive to light.   Neck:   Normal range of motion.  Cardiovascular:  Normal rate and regular rhythm.           Pulmonary/Chest: Breath sounds normal.   Abdominal: Abdomen is soft. Bowel sounds are normal. There is abdominal tenderness.   Musculoskeletal:         General: Normal range of motion.      Cervical back: Normal range of motion.     Neurological: She is alert and oriented to person, place, and time.   Skin: Skin is warm and dry.   Psychiatric: She has a normal mood and affect.         ED Course   Procedures  Labs Reviewed   CBC W/ AUTO DIFFERENTIAL - Abnormal; Notable for the following components:       Result Value    RDW 16.4 (*)     All other components within normal limits   COMPREHENSIVE METABOLIC PANEL - Abnormal; Notable for the following components:    BUN 28 (*)     Creatinine 2.1 (*)     Albumin 3.4 (*)     Total Bilirubin 1.3 (*)     Alkaline Phosphatase 164 (*)     eGFR 23.7 (*)     All other components within normal limits   URINALYSIS, REFLEX TO URINE CULTURE - Abnormal; Notable for the following components:    Glucose, UA 1+ (*)     All other components within normal limits    Narrative:     Preferred Collection Type->Urine, Clean Catch  Specimen Source->Urine   LIPASE          Imaging Results              CT Abdomen Pelvis  Without Contrast  (Final result)  Result time 05/22/24 14:51:43      Final result by Alexis Rai MD (05/22/24 14:51:43)                   Impression:      1. No acute abdominopelvic findings.  2. Right inguinal hernia containing nondilated loops of small bowel.  3. Moderate hiatal hernia.      Electronically signed by: Alexis Rai MD  Date:    05/22/2024  Time:    14:51               Narrative:    EXAMINATION:  CT ABDOMEN PELVIS WITHOUT CONTRAST    CLINICAL HISTORY:  RLQ abdominal pain (Age >= 14y);    TECHNIQUE:  Iterative reconstruction technique was used.    CT/cardiac nuclear exam/s in prior 12 months:  0.    FINDINGS:  Unremarkable noncontrast liver, spleen, and gallbladder.  Unremarkable noncontrast pancreas and adrenal glands.  No stones in either kidney, and no hydronephrosis.  A few parenchymal calcifications of the lower pole left kidney.  Bilateral renal cysts.  Right inguinal hernia containing a nondilated loop of small bowel.  Moderate hiatal hernia.  Normal appendix.  No free fluid.  Moderate scattered vascular calcifications.                                       Medications   sodium chloride 0.9% bolus 1,000 mL 1,000 mL (0 mLs Intravenous Stopped 5/22/24 1446)     Medical Decision Making  Amount and/or Complexity of Data Reviewed  Labs: ordered.  Radiology: ordered.    Risk  Prescription drug management.                                      Clinical Impression:  Final diagnoses:  [E86.0] Dehydration (Primary)  [K40.90] Unilateral inguinal hernia without obstruction or gangrene, recurrence not specified  [K44.9] Hiatal hernia          ED Disposition Condition    Discharge Stable          ED Prescriptions       Medication Sig Dispense Start Date End Date Auth. Provider    traMADoL (ULTRAM) 50 mg tablet Take 1 tablet (50 mg total) by mouth every 8 (eight) hours as needed for Pain. 12 tablet 5/22/2024 -- Wendi Grier NP          Follow-up Information       Follow up With Specialties Details Why Contact  Info    Amanda Guzman MD Internal Medicine  As needed [address] [facility] [address]  [phone]      Jenna Le MD General Surgery   [address] [facility]  [address] [place] [id number]  [phone]               Wendi Grier, FATOUMATA  05/22/24 6661

## 2024-06-06 ENCOUNTER — OFFICE VISIT (OUTPATIENT)
Dept: SURGERY | Facility: CLINIC | Age: 79
End: 2024-06-06
Payer: MEDICARE

## 2024-06-06 VITALS
BODY MASS INDEX: 25.67 KG/M2 | WEIGHT: 163.56 LBS | OXYGEN SATURATION: 93 % | HEIGHT: 67 IN | SYSTOLIC BLOOD PRESSURE: 158 MMHG | HEART RATE: 64 BPM | DIASTOLIC BLOOD PRESSURE: 77 MMHG

## 2024-06-06 DIAGNOSIS — K40.90 INGUINAL HERNIA OF RIGHT SIDE WITHOUT OBSTRUCTION OR GANGRENE: Primary | ICD-10-CM

## 2024-06-06 PROCEDURE — 1159F MED LIST DOCD IN RCRD: CPT | Mod: CPTII,S$GLB,, | Performed by: STUDENT IN AN ORGANIZED HEALTH CARE EDUCATION/TRAINING PROGRAM

## 2024-06-06 PROCEDURE — 99999 PR PBB SHADOW E&M-EST. PATIENT-LVL IV: CPT | Mod: PBBFAC,,, | Performed by: STUDENT IN AN ORGANIZED HEALTH CARE EDUCATION/TRAINING PROGRAM

## 2024-06-06 PROCEDURE — 3078F DIAST BP <80 MM HG: CPT | Mod: CPTII,S$GLB,, | Performed by: STUDENT IN AN ORGANIZED HEALTH CARE EDUCATION/TRAINING PROGRAM

## 2024-06-06 PROCEDURE — 99204 OFFICE O/P NEW MOD 45 MIN: CPT | Mod: S$GLB,,, | Performed by: STUDENT IN AN ORGANIZED HEALTH CARE EDUCATION/TRAINING PROGRAM

## 2024-06-06 PROCEDURE — 3077F SYST BP >= 140 MM HG: CPT | Mod: CPTII,S$GLB,, | Performed by: STUDENT IN AN ORGANIZED HEALTH CARE EDUCATION/TRAINING PROGRAM

## 2024-06-07 RX ORDER — SODIUM CHLORIDE 9 MG/ML
INJECTION, SOLUTION INTRAVENOUS CONTINUOUS
OUTPATIENT
Start: 2024-06-07

## 2024-06-07 RX ORDER — ONDANSETRON HYDROCHLORIDE 2 MG/ML
4 INJECTION, SOLUTION INTRAVENOUS EVERY 12 HOURS PRN
OUTPATIENT
Start: 2024-06-07

## 2024-06-07 RX ORDER — METOCLOPRAMIDE HYDROCHLORIDE 5 MG/ML
5 INJECTION INTRAMUSCULAR; INTRAVENOUS EVERY 6 HOURS PRN
OUTPATIENT
Start: 2024-06-07

## 2024-06-07 RX ORDER — CEFAZOLIN SODIUM 2 G/50ML
2 SOLUTION INTRAVENOUS
OUTPATIENT
Start: 2024-06-07

## 2024-06-15 NOTE — H&P
[facility]  General Surgery Clinic H&P      Consult:  Right inguinal hernia  Consulting Service:  Dr. Guzman   Chief Complaint:  Right groin pain    HPI: Pt is a 79 y.o. female who presents with right inguinal hernia.  Says right groin pain exacerbated with bearing down or excessive standing.  Currently ambulates with the aid of a cane or walker and does require some help with ADLs from family.    CT scan which found inguinal hernia also detected hiatal hernia.  Patient denies pain with eating, difficulty swallowing, a retrosternal discomfort.    PMH:   Past Medical History:   Diagnosis Date    Arthritis     Asthma     Cataract     BILATERAL    Diabetes mellitus, type 2     Femoral hernia     H/O: Bell's palsy     Hiatal hernia     Hypertension     Wears dentures     FULL     PSH:   Past Surgical History:   Procedure Laterality Date    HYSTERECTOMY      OOPHORECTOMY      right knee surgery       Meds: See medication list;  Eliquis  ALL: Penicillins and Sulfa (sulfonamide antibiotics)  FHX: non contributory   SOC:   Social History     Socioeconomic History    Marital status:    Tobacco Use    Smoking status: Never    Smokeless tobacco: Never   Substance and Sexual Activity    Alcohol use: No    Drug use: No     Social Determinants of Health     Financial Resource Strain: Low Risk  (3/8/2024)    Overall Financial Resource Strain (CARDIA)     Difficulty of Paying Living Expenses: Not hard at all   Food Insecurity: No Food Insecurity (3/8/2024)    Hunger Vital Sign     Worried About Running Out of Food in the Last Year: Never true     Ran Out of Food in the Last Year: Never true   Transportation Needs: No Transportation Needs (3/8/2024)    PRAPARE - Transportation     Lack of Transportation (Medical): No     Lack of Transportation (Non-Medical): No   Physical Activity: Inactive (3/8/2024)    Exercise Vital Sign     Days of Exercise per Week: 0 days     Minutes of Exercise per Session: 0 min   Stress:  "Stress Concern Present (3/8/2024)    [employer] - Occupational Stress Questionnaire     Feeling of Stress : To some extent   Housing Stability: Low Risk  (3/8/2024)    Housing Stability Vital Sign     Unable to Pay for Housing in the Last Year: No     Number of Places Lived in the Last Year: 1     Unstable Housing in the Last Year: No     ROS: Review of Systems   Constitutional:  Negative for chills, fever, malaise/fatigue and weight loss.   Respiratory:  Negative for cough.    Cardiovascular:  Negative for chest pain.   Gastrointestinal:  Negative for abdominal pain, blood in stool, constipation, diarrhea, heartburn, melena, nausea and vomiting.   All other systems reviewed and are negative.      Physical Exam:  BP (!) 158/77   Pulse 64   Ht 5' 7" (1.702 m)   Wt 74.2 kg (163 lb 9.3 oz)   SpO2 (!) 93%   BMI 25.62 kg/m²   Physical Exam  Constitutional:       General: She is not in acute distress.     Appearance: She is not ill-appearing or toxic-appearing.   Cardiovascular:      Rate and Rhythm: Normal rate and regular rhythm.   Pulmonary:      Effort: Pulmonary effort is normal. No respiratory distress.   Abdominal:      General: There is no distension.      Palpations: Abdomen is soft.      Tenderness: There is no abdominal tenderness. There is no guarding or rebound.   Genitourinary:     Comments: Reducible, soft right inguinal hernia  Musculoskeletal:      Right lower leg: No edema.      Left lower leg: No edema.   Skin:     General: Skin is warm and dry.      Capillary Refill: Capillary refill takes less than 2 seconds.   Neurological:      Mental Status: She is alert. Mental status is at baseline.           Imaging:   CT abd/pelvis:     Impression:     1. No acute abdominopelvic findings.  2. Right inguinal hernia containing nondilated loops of small bowel.  3. Moderate hiatal hernia.    A/P: Pt is a 79 y.o. female who presents with right inguinal and hiatal hernia.  -hiatal " hernia asymptomatic.  Patient wishes to not pursue surgical correction at this time  -To OR 7/10 for open RIH repair   -Cardia clearance - pt on Eliquis with cardiomegaly seen on CT scan   -most recent echo not available.  We will reach out to Cardiology for records  -Extensive recovery after surgery   -hold Eliquis 48 hours prior to procedure  -informed consent obtained - patient voiced understanding that she is at slight increased risk of postoperative bleeding, infection, respiratory distress, and cardiac event.      Jenna Le MD  [phone]

## 2024-06-20 ENCOUNTER — LAB VISIT (OUTPATIENT)
Dept: LAB | Facility: HOSPITAL | Age: 79
End: 2024-06-20
Attending: INTERNAL MEDICINE
Payer: MEDICARE

## 2024-06-20 DIAGNOSIS — E10.9 DIABETES MELLITUS TYPE I: ICD-10-CM

## 2024-06-20 DIAGNOSIS — I25.10 CORONARY ATHEROSCLEROSIS OF NATIVE CORONARY ARTERY: Primary | ICD-10-CM

## 2024-06-20 DIAGNOSIS — I10 ESSENTIAL HYPERTENSION, MALIGNANT: ICD-10-CM

## 2024-06-20 DIAGNOSIS — E78.2 MIXED HYPERLIPIDEMIA: ICD-10-CM

## 2024-06-20 DIAGNOSIS — I49.2 JUNCTIONAL PREMATURE DEPOLARIZATION: ICD-10-CM

## 2024-06-20 LAB
ALBUMIN SERPL BCP-MCNC: 3.3 G/DL (ref 3.5–5.2)
ALP SERPL-CCNC: 136 U/L (ref 55–135)
ALT SERPL W/O P-5'-P-CCNC: 28 U/L (ref 10–44)
ANION GAP SERPL CALC-SCNC: 8 MMOL/L (ref 3–11)
AST SERPL-CCNC: 19 U/L (ref 10–40)
BASOPHILS # BLD AUTO: 0.09 K/UL (ref 0–0.2)
BASOPHILS NFR BLD: 1.2 % (ref 0–1.9)
BILIRUB SERPL-MCNC: 0.7 MG/DL (ref 0.1–1)
BUN SERPL-MCNC: 37 MG/DL (ref 8–23)
CALCIUM SERPL-MCNC: 9.4 MG/DL (ref 8.7–10.5)
CHLORIDE SERPL-SCNC: 102 MMOL/L (ref 95–110)
CHOLEST SERPL-MCNC: 166 MG/DL (ref 120–199)
CHOLEST/HDLC SERPL: 2.3 {RATIO} (ref 2–5)
CO2 SERPL-SCNC: 28 MMOL/L (ref 23–29)
CREAT SERPL-MCNC: 2.4 MG/DL (ref 0.5–1.4)
DIFFERENTIAL METHOD BLD: ABNORMAL
EOSINOPHIL # BLD AUTO: 0.5 K/UL (ref 0–0.5)
EOSINOPHIL NFR BLD: 6.2 % (ref 0–8)
ERYTHROCYTE [DISTWIDTH] IN BLOOD BY AUTOMATED COUNT: 16.4 % (ref 11.5–14.5)
EST. GFR  (NO RACE VARIABLE): 20.2 ML/MIN/1.73 M^2
ESTIMATED AVG GLUCOSE: 114 MG/DL (ref 68–131)
GLUCOSE SERPL-MCNC: 84 MG/DL (ref 70–110)
HBA1C MFR BLD: 5.6 % (ref 4–5.6)
HCT VFR BLD AUTO: 41.1 % (ref 37–48.5)
HDLC SERPL-MCNC: 73 MG/DL (ref 40–75)
HDLC SERPL: 44 % (ref 20–50)
HGB BLD-MCNC: 14.1 G/DL (ref 12–16)
IMM GRANULOCYTES # BLD AUTO: 0.02 K/UL (ref 0–0.04)
IMM GRANULOCYTES NFR BLD AUTO: 0.3 % (ref 0–0.5)
LDLC SERPL CALC-MCNC: 78 MG/DL (ref 63–159)
LYMPHOCYTES # BLD AUTO: 2.1 K/UL (ref 1–4.8)
LYMPHOCYTES NFR BLD: 28.4 % (ref 18–48)
MCH RBC QN AUTO: 30.9 PG (ref 27–31)
MCHC RBC AUTO-ENTMCNC: 34.3 G/DL (ref 32–36)
MCV RBC AUTO: 90 FL (ref 82–98)
MONOCYTES # BLD AUTO: 0.7 K/UL (ref 0.3–1)
MONOCYTES NFR BLD: 9.7 % (ref 4–15)
NEUTROPHILS # BLD AUTO: 3.9 K/UL (ref 1.8–7.7)
NEUTROPHILS NFR BLD: 54.2 % (ref 38–73)
NONHDLC SERPL-MCNC: 93 MG/DL
NRBC BLD-RTO: 0 /100 WBC
NT-PROBNP SERPL-MCNC: 574 PG/ML (ref 5–1800)
PLATELET # BLD AUTO: 267 K/UL (ref 150–450)
PMV BLD AUTO: 10.4 FL (ref 9.2–12.9)
POTASSIUM SERPL-SCNC: 4.2 MMOL/L (ref 3.5–5.1)
PROT SERPL-MCNC: 7.8 G/DL (ref 6–8.4)
RBC # BLD AUTO: 4.56 M/UL (ref 4–5.4)
SODIUM SERPL-SCNC: 138 MMOL/L (ref 136–145)
TRIGL SERPL-MCNC: 75 MG/DL (ref 30–150)
WBC # BLD AUTO: 7.25 K/UL (ref 3.9–12.7)

## 2024-06-20 PROCEDURE — 36415 COLL VENOUS BLD VENIPUNCTURE: CPT | Performed by: INTERNAL MEDICINE

## 2024-06-20 PROCEDURE — 80053 COMPREHEN METABOLIC PANEL: CPT | Performed by: INTERNAL MEDICINE

## 2024-06-20 PROCEDURE — 80061 LIPID PANEL: CPT | Performed by: INTERNAL MEDICINE

## 2024-06-20 PROCEDURE — 85025 COMPLETE CBC W/AUTO DIFF WBC: CPT | Performed by: INTERNAL MEDICINE

## 2024-06-20 PROCEDURE — 83036 HEMOGLOBIN GLYCOSYLATED A1C: CPT | Performed by: INTERNAL MEDICINE

## 2024-06-20 PROCEDURE — 83880 ASSAY OF NATRIURETIC PEPTIDE: CPT | Performed by: INTERNAL MEDICINE

## 2024-06-27 ENCOUNTER — LAB VISIT (OUTPATIENT)
Dept: LAB | Facility: HOSPITAL | Age: 79
End: 2024-06-27
Attending: INTERNAL MEDICINE
Payer: MEDICARE

## 2024-06-27 DIAGNOSIS — E11.9 TYPE 2 DIABETES MELLITUS WITHOUT COMPLICATION, WITHOUT LONG-TERM CURRENT USE OF INSULIN: ICD-10-CM

## 2024-06-27 PROBLEM — N18.4 STAGE 4 CHRONIC KIDNEY DISEASE: Status: ACTIVE | Noted: 2024-06-27

## 2024-06-27 PROBLEM — Z00.00 ENCOUNTER FOR ANNUAL WELLNESS EXAM IN MEDICARE PATIENT: Status: ACTIVE | Noted: 2024-06-27

## 2024-06-27 LAB
ALBUMIN/CREAT UR: NORMAL UG/MG (ref 0–30)
CREAT UR-MCNC: 18.7 MG/DL (ref 15–325)
MICROALBUMIN UR DL<=1MG/L-MCNC: <5 MG/L

## 2024-06-27 PROCEDURE — 82570 ASSAY OF URINE CREATININE: CPT | Performed by: INTERNAL MEDICINE

## 2024-08-08 ENCOUNTER — HOSPITAL ENCOUNTER (OUTPATIENT)
Dept: RADIOLOGY | Facility: HOSPITAL | Age: 79
Discharge: HOME | End: 2024-08-08
Attending: INTERNAL MEDICINE
Payer: MEDICARE

## 2024-08-08 VITALS — BODY MASS INDEX: 27.31 KG/M2 | HEIGHT: 67 IN | WEIGHT: 174 LBS

## 2024-08-08 DIAGNOSIS — Z12.31 SCREENING MAMMOGRAM FOR BREAST CANCER: ICD-10-CM

## 2024-08-08 PROCEDURE — 77067 SCR MAMMO BI INCL CAD: CPT | Mod: TC

## 2024-09-30 PROBLEM — Z00.00 ENCOUNTER FOR ANNUAL WELLNESS EXAM IN MEDICARE PATIENT: Status: RESOLVED | Noted: 2024-06-27 | Resolved: 2024-09-30

## 2024-10-04 ENCOUNTER — LAB VISIT (OUTPATIENT)
Dept: LAB | Facility: HOSPITAL | Age: 79
End: 2024-10-04
Attending: INTERNAL MEDICINE
Payer: MEDICARE

## 2024-10-04 DIAGNOSIS — I10 ESSENTIAL HYPERTENSION, MALIGNANT: ICD-10-CM

## 2024-10-04 DIAGNOSIS — E11.9 DIABETES MELLITUS WITHOUT COMPLICATION: ICD-10-CM

## 2024-10-04 DIAGNOSIS — I25.10 CORONARY ATHEROSCLEROSIS OF NATIVE CORONARY ARTERY: Primary | ICD-10-CM

## 2024-10-04 LAB
T4 FREE SERPL-MCNC: 1.24 NG/DL (ref 0.71–1.51)
TSH SERPL DL<=0.005 MIU/L-ACNC: 1.62 UIU/ML (ref 0.4–4)

## 2024-10-04 PROCEDURE — 36415 COLL VENOUS BLD VENIPUNCTURE: CPT | Performed by: INTERNAL MEDICINE

## 2024-10-04 PROCEDURE — 84443 ASSAY THYROID STIM HORMONE: CPT | Performed by: INTERNAL MEDICINE

## 2024-10-04 PROCEDURE — 84439 ASSAY OF FREE THYROXINE: CPT | Performed by: INTERNAL MEDICINE

## 2024-10-21 ENCOUNTER — LAB VISIT (OUTPATIENT)
Dept: LAB | Facility: HOSPITAL | Age: 79
End: 2024-10-21
Attending: INTERNAL MEDICINE
Payer: MEDICARE

## 2024-10-21 DIAGNOSIS — E11.22 TYPE 2 DIABETES MELLITUS WITH DIABETIC CHRONIC KIDNEY DISEASE: ICD-10-CM

## 2024-10-21 DIAGNOSIS — I12.9 HYPERTENSIVE KIDNEY DISEASE WITH CKD (CHRONIC KIDNEY DISEASE), STAGE 1-4 OR UNSPECIFIED CHRONIC KIDNEY DISEASE: Primary | ICD-10-CM

## 2024-10-21 LAB
ALBUMIN SERPL BCP-MCNC: 3.7 G/DL (ref 3.5–5.2)
ALP SERPL-CCNC: 102 U/L (ref 55–135)
ALT SERPL W/O P-5'-P-CCNC: 19 U/L (ref 10–44)
ANION GAP SERPL CALC-SCNC: 7 MMOL/L (ref 3–11)
AST SERPL-CCNC: 14 U/L (ref 10–40)
BASOPHILS # BLD AUTO: 0.08 K/UL (ref 0–0.2)
BASOPHILS NFR BLD: 1.3 % (ref 0–1.9)
BILIRUB SERPL-MCNC: 0.4 MG/DL (ref 0.1–1)
BUN SERPL-MCNC: 35 MG/DL (ref 8–23)
CALCIUM SERPL-MCNC: 9.5 MG/DL (ref 8.7–10.5)
CHLORIDE SERPL-SCNC: 103 MMOL/L (ref 95–110)
CO2 SERPL-SCNC: 30 MMOL/L (ref 23–29)
CREAT SERPL-MCNC: 2.7 MG/DL (ref 0.5–1.4)
CREAT UR-MCNC: 131 MG/DL (ref 15–325)
DIFFERENTIAL METHOD BLD: ABNORMAL
EOSINOPHIL # BLD AUTO: 0.2 K/UL (ref 0–0.5)
EOSINOPHIL NFR BLD: 3.6 % (ref 0–8)
ERYTHROCYTE [DISTWIDTH] IN BLOOD BY AUTOMATED COUNT: 15 % (ref 11.5–14.5)
EST. GFR  (NO RACE VARIABLE): 17.5 ML/MIN/1.73 M^2
GLUCOSE SERPL-MCNC: 85 MG/DL (ref 70–110)
HCT VFR BLD AUTO: 37.5 % (ref 37–48.5)
HGB BLD-MCNC: 12.4 G/DL (ref 12–16)
IMM GRANULOCYTES # BLD AUTO: 0.01 K/UL (ref 0–0.04)
IMM GRANULOCYTES NFR BLD AUTO: 0.2 % (ref 0–0.5)
LYMPHOCYTES # BLD AUTO: 1.8 K/UL (ref 1–4.8)
LYMPHOCYTES NFR BLD: 30.1 % (ref 18–48)
MCH RBC QN AUTO: 32.4 PG (ref 27–31)
MCHC RBC AUTO-ENTMCNC: 33.1 G/DL (ref 32–36)
MCV RBC AUTO: 98 FL (ref 82–98)
MONOCYTES # BLD AUTO: 0.6 K/UL (ref 0.3–1)
MONOCYTES NFR BLD: 9.1 % (ref 4–15)
NEUTROPHILS # BLD AUTO: 3.4 K/UL (ref 1.8–7.7)
NEUTROPHILS NFR BLD: 55.7 % (ref 38–73)
NRBC BLD-RTO: 0 /100 WBC
PLATELET # BLD AUTO: 240 K/UL (ref 150–450)
PMV BLD AUTO: 10.2 FL (ref 9.2–12.9)
POTASSIUM SERPL-SCNC: 4.4 MMOL/L (ref 3.5–5.1)
PROT SERPL-MCNC: 7.6 G/DL (ref 6–8.4)
PROT UR-MCNC: 7.9 MG/DL (ref 0–15)
PROT/CREAT UR: 0.06 MG/G{CREAT} (ref 0–0.2)
RBC # BLD AUTO: 3.83 M/UL (ref 4–5.4)
SODIUM SERPL-SCNC: 140 MMOL/L (ref 136–145)
WBC # BLD AUTO: 6.07 K/UL (ref 3.9–12.7)

## 2024-10-21 PROCEDURE — 80053 COMPREHEN METABOLIC PANEL: CPT | Performed by: INTERNAL MEDICINE

## 2024-10-21 PROCEDURE — 36415 COLL VENOUS BLD VENIPUNCTURE: CPT | Performed by: INTERNAL MEDICINE

## 2024-10-21 PROCEDURE — 85025 COMPLETE CBC W/AUTO DIFF WBC: CPT | Performed by: INTERNAL MEDICINE

## 2024-10-21 PROCEDURE — 84156 ASSAY OF PROTEIN URINE: CPT | Performed by: INTERNAL MEDICINE

## 2025-02-02 ENCOUNTER — HOSPITAL ENCOUNTER (EMERGENCY)
Facility: HOSPITAL | Age: 80
Discharge: HOME | End: 2025-02-02
Attending: EMERGENCY MEDICINE
Payer: MEDICARE

## 2025-02-02 VITALS
WEIGHT: 185 LBS | HEART RATE: 45 BPM | RESPIRATION RATE: 18 BRPM | BODY MASS INDEX: 29.03 KG/M2 | OXYGEN SATURATION: 99 % | SYSTOLIC BLOOD PRESSURE: 121 MMHG | TEMPERATURE: 98 F | HEIGHT: 67 IN | DIASTOLIC BLOOD PRESSURE: 71 MMHG

## 2025-02-02 DIAGNOSIS — R42 DIZZINESS: ICD-10-CM

## 2025-02-02 DIAGNOSIS — R00.1 SINUS BRADYCARDIA, CHRONIC: Primary | ICD-10-CM

## 2025-02-02 LAB
ALBUMIN SERPL BCP-MCNC: 3.6 G/DL (ref 3.5–5.2)
ALP SERPL-CCNC: 80 U/L (ref 55–135)
ALT SERPL W/O P-5'-P-CCNC: <8 U/L (ref 10–44)
ANION GAP SERPL CALC-SCNC: 11 MMOL/L (ref 8–16)
AST SERPL-CCNC: 14 U/L (ref 10–40)
BACTERIA #/AREA URNS HPF: ABNORMAL /HPF
BASOPHILS # BLD AUTO: 0.07 K/UL (ref 0–0.2)
BASOPHILS NFR BLD: 1 % (ref 0–1.9)
BILIRUB SERPL-MCNC: 0.5 MG/DL (ref 0.1–1)
BILIRUB UR QL STRIP: NEGATIVE
BUN SERPL-MCNC: 41 MG/DL (ref 8–23)
CALCIUM SERPL-MCNC: 9.3 MG/DL (ref 8.7–10.5)
CHLORIDE SERPL-SCNC: 106 MMOL/L (ref 95–110)
CLARITY UR: CLEAR
CO2 SERPL-SCNC: 22 MMOL/L (ref 23–29)
COLOR UR: YELLOW
CREAT SERPL-MCNC: 2.6 MG/DL (ref 0.5–1.4)
DIFFERENTIAL METHOD BLD: ABNORMAL
EOSINOPHIL # BLD AUTO: 0.3 K/UL (ref 0–0.5)
EOSINOPHIL NFR BLD: 4.7 % (ref 0–8)
ERYTHROCYTE [DISTWIDTH] IN BLOOD BY AUTOMATED COUNT: 14.6 % (ref 11.5–14.5)
EST. GFR  (NO RACE VARIABLE): 18.3 ML/MIN/1.73 M^2
GLUCOSE SERPL-MCNC: 92 MG/DL (ref 70–110)
GLUCOSE UR QL STRIP: ABNORMAL
HCT VFR BLD AUTO: 41.3 % (ref 37–48.5)
HGB BLD-MCNC: 13.8 G/DL (ref 12–16)
HGB UR QL STRIP: NEGATIVE
HYALINE CASTS #/AREA URNS LPF: 0.7 /LPF
IMM GRANULOCYTES # BLD AUTO: 0.01 K/UL (ref 0–0.04)
IMM GRANULOCYTES NFR BLD AUTO: 0.1 % (ref 0–0.5)
KETONES UR QL STRIP: NEGATIVE
LEUKOCYTE ESTERASE UR QL STRIP: ABNORMAL
LYMPHOCYTES # BLD AUTO: 1.6 K/UL (ref 1–4.8)
LYMPHOCYTES NFR BLD: 23.5 % (ref 18–48)
MAGNESIUM SERPL-MCNC: 2 MG/DL (ref 1.6–2.6)
MCH RBC QN AUTO: 31.7 PG (ref 27–31)
MCHC RBC AUTO-ENTMCNC: 33.4 G/DL (ref 32–36)
MCV RBC AUTO: 95 FL (ref 82–98)
MICROSCOPIC COMMENT: ABNORMAL
MONOCYTES # BLD AUTO: 0.5 K/UL (ref 0.3–1)
MONOCYTES NFR BLD: 7.6 % (ref 4–15)
NEUTROPHILS # BLD AUTO: 4.4 K/UL (ref 1.8–7.7)
NEUTROPHILS NFR BLD: 63.1 % (ref 38–73)
NITRITE UR QL STRIP: NEGATIVE
NRBC BLD-RTO: 0 /100 WBC
NT-PROBNP SERPL-MCNC: 555 PG/ML (ref 5–1800)
OHS QRS DURATION: 82 MS
OHS QTC CALCULATION: 463 MS
PH UR STRIP: 6 [PH] (ref 5–8)
PLATELET # BLD AUTO: 224 K/UL (ref 150–450)
PMV BLD AUTO: 10.2 FL (ref 9.2–12.9)
POTASSIUM SERPL-SCNC: 4 MMOL/L (ref 3.5–5.1)
PROT SERPL-MCNC: 7.2 G/DL (ref 6–8.4)
PROT UR QL STRIP: NEGATIVE
RBC # BLD AUTO: 4.35 M/UL (ref 4–5.4)
RBC #/AREA URNS HPF: 0 /HPF (ref 0–4)
SODIUM SERPL-SCNC: 139 MMOL/L (ref 136–145)
SP GR UR STRIP: 1.01 (ref 1–1.03)
SQUAMOUS #/AREA URNS HPF: 1 /HPF
TROPONIN I SERPL DL<=0.01 NG/ML-MCNC: 7 NG/L (ref 0–14)
TSH SERPL DL<=0.005 MIU/L-ACNC: 1.18 UIU/ML (ref 0.4–4)
URN SPEC COLLECT METH UR: ABNORMAL
UROBILINOGEN UR STRIP-ACNC: 1 EU/DL
WBC # BLD AUTO: 6.97 K/UL (ref 3.9–12.7)
WBC #/AREA URNS HPF: 1 /HPF (ref 0–5)
YEAST URNS QL MICRO: ABNORMAL

## 2025-02-02 PROCEDURE — 36415 COLL VENOUS BLD VENIPUNCTURE: CPT | Performed by: EMERGENCY MEDICINE

## 2025-02-02 PROCEDURE — 85025 COMPLETE CBC W/AUTO DIFF WBC: CPT | Performed by: EMERGENCY MEDICINE

## 2025-02-02 PROCEDURE — 84484 ASSAY OF TROPONIN QUANT: CPT | Performed by: EMERGENCY MEDICINE

## 2025-02-02 PROCEDURE — 93010 ELECTROCARDIOGRAM REPORT: CPT | Mod: ,,, | Performed by: INTERNAL MEDICINE

## 2025-02-02 PROCEDURE — 25000003 PHARM REV CODE 250: Performed by: EMERGENCY MEDICINE

## 2025-02-02 PROCEDURE — 84443 ASSAY THYROID STIM HORMONE: CPT | Performed by: EMERGENCY MEDICINE

## 2025-02-02 PROCEDURE — 80053 COMPREHEN METABOLIC PANEL: CPT | Performed by: EMERGENCY MEDICINE

## 2025-02-02 PROCEDURE — 83735 ASSAY OF MAGNESIUM: CPT | Performed by: EMERGENCY MEDICINE

## 2025-02-02 PROCEDURE — 81000 URINALYSIS NONAUTO W/SCOPE: CPT | Performed by: EMERGENCY MEDICINE

## 2025-02-02 PROCEDURE — 93005 ELECTROCARDIOGRAM TRACING: CPT

## 2025-02-02 PROCEDURE — 83880 ASSAY OF NATRIURETIC PEPTIDE: CPT | Performed by: EMERGENCY MEDICINE

## 2025-02-02 PROCEDURE — 99284 EMERGENCY DEPT VISIT MOD MDM: CPT | Mod: 25

## 2025-02-02 RX ORDER — MECLIZINE HYDROCHLORIDE 25 MG/1
50 TABLET ORAL
Status: COMPLETED | OUTPATIENT
Start: 2025-02-02 | End: 2025-02-02

## 2025-02-02 RX ORDER — MECLIZINE HYDROCHLORIDE 25 MG/1
25 TABLET ORAL 3 TIMES DAILY PRN
Qty: 20 TABLET | Refills: 0 | Status: SHIPPED | OUTPATIENT
Start: 2025-02-02

## 2025-02-02 RX ORDER — ONDANSETRON 8 MG/1
8 TABLET, ORALLY DISINTEGRATING ORAL EVERY 6 HOURS PRN
Qty: 21 TABLET | Refills: 0 | Status: SHIPPED | OUTPATIENT
Start: 2025-02-02

## 2025-02-02 RX ORDER — LISINOPRIL 20 MG/1
20 TABLET ORAL DAILY
COMMUNITY

## 2025-02-02 RX ORDER — ONDANSETRON 4 MG/1
8 TABLET, ORALLY DISINTEGRATING ORAL
Status: COMPLETED | OUTPATIENT
Start: 2025-02-02 | End: 2025-02-02

## 2025-02-02 RX ORDER — DAPAGLIFLOZIN 10 MG/1
10 TABLET, FILM COATED ORAL DAILY
COMMUNITY
End: 2025-02-28

## 2025-02-02 RX ADMIN — ONDANSETRON 8 MG: 4 TABLET, ORALLY DISINTEGRATING ORAL at 08:02

## 2025-02-02 RX ADMIN — MECLIZINE HYDROCHLORIDE 50 MG: 25 TABLET ORAL at 08:02

## 2025-02-02 NOTE — ED PROVIDER NOTES
Encounter Date: 2/2/2025       History     Chief Complaint   Patient presents with    Dizziness     Patient to the ER with complaints of dizziness and nausea onset this morning.     79-year-old female presents the ER with dizziness that began this morning associated with mild nausea.  Feels like the room is spinning.  Denies any vomiting.  No chest pain or shortness of breath.  No diarrhea.  No abdominal pain.  Not ill appearing, alert oriented x4, GCS is 15.  History of chronic Bell's palsy.  She is pleasant and polite.  Speaking in full sentences without issue.  History of bradycardia as well.  In the ER today her pulses 43, but she has had this multiple times in the past.  She is on amiodarone as well as Eliquis      Review of patient's allergies indicates:   Allergen Reactions    Penicillins Rash    Sulfa (sulfonamide antibiotics) Rash     Past Medical History:   Diagnosis Date    Arthritis     Asthma     Cataract     BILATERAL    Diabetes mellitus, type 2     Encounter for annual wellness exam in Medicare patient 6/27/2024    Femoral hernia     H/O: Bell's palsy     Hiatal hernia     Hypertension     Wears dentures     FULL     Past Surgical History:   Procedure Laterality Date    HYSTERECTOMY      OOPHORECTOMY      right knee surgery       Family History   Problem Relation Name Age of Onset    Brain Hemorrhage Mother  33    No Known Problems Father      No Known Problems Sister      Ovarian cancer Daughter      No Known Problems Maternal Aunt      No Known Problems Maternal Uncle      No Known Problems Paternal Aunt      No Known Problems Paternal Uncle      No Known Problems Maternal Grandmother      No Known Problems Maternal Grandfather      No Known Problems Paternal Grandmother      No Known Problems Paternal Grandfather      Heart disease Brother      Heart disease Brother      Heart disease Brother      Breast cancer Neg Hx      BRCA 1/2 Neg Hx       Social History     Tobacco Use    Smoking status:  Never    Smokeless tobacco: Never   Substance Use Topics    Alcohol use: No    Drug use: No     Review of Systems   Constitutional:  Negative for fever.   HENT:  Negative for sore throat.    Respiratory:  Negative for shortness of breath.    Cardiovascular:  Negative for chest pain.   Gastrointestinal:  Negative for nausea.   Genitourinary:  Negative for dysuria.   Musculoskeletal:  Negative for back pain.   Skin:  Negative for rash.   Neurological:  Positive for dizziness and facial asymmetry. Negative for weakness.   Hematological:  Does not bruise/bleed easily.   All other systems reviewed and are negative.      Physical Exam     Initial Vitals [02/02/25 0755]   BP Pulse Resp Temp SpO2   (!) 173/70 (!) 43 18 98 °F (36.7 °C) 100 %      MAP       --         Physical Exam    Nursing note and vitals reviewed.  Constitutional: She appears well-developed and well-nourished. She is not diaphoretic. No distress.   HENT:   Head: Normocephalic and atraumatic.   Eyes: Conjunctivae and EOM are normal. Pupils are equal, round, and reactive to light.   Neck: Neck supple.   Normal range of motion.  Cardiovascular:  Regular rhythm, normal heart sounds and intact distal pulses.           No murmur heard.  Bradycardic at a rate of 47   Pulmonary/Chest: Breath sounds normal. No respiratory distress. She has no wheezes. She has no rhonchi. She has no rales. She exhibits no tenderness.   Abdominal: Abdomen is soft. Bowel sounds are normal.   Musculoskeletal:         General: No tenderness or edema. Normal range of motion.      Cervical back: Normal range of motion and neck supple.     Neurological: She is alert and oriented to person, place, and time. She has normal strength. GCS score is 15. GCS eye subscore is 4. GCS verbal subscore is 5. GCS motor subscore is 6.   Right-sided facial droop consistent with chronic Bell's palsy   Skin: Skin is warm and dry. Capillary refill takes less than 2 seconds.         ED Course    Procedures  Labs Reviewed   CBC W/ AUTO DIFFERENTIAL - Abnormal       Result Value    WBC 6.97      RBC 4.35      Hemoglobin 13.8      Hematocrit 41.3      MCV 95      MCH 31.7 (*)     MCHC 33.4      RDW 14.6 (*)     Platelets 224      MPV 10.2      Immature Granulocytes 0.1      Gran # (ANC) 4.4      Immature Grans (Abs) 0.01      Lymph # 1.6      Mono # 0.5      Eos # 0.3      Baso # 0.07      nRBC 0      Gran % 63.1      Lymph % 23.5      Mono % 7.6      Eosinophil % 4.7      Basophil % 1.0      Differential Method Automated     COMPREHENSIVE METABOLIC PANEL - Abnormal    Sodium 139      Potassium 4.0      Chloride 106      CO2 22 (*)     Glucose 92      BUN 41 (*)     Creatinine 2.6 (*)     Calcium 9.3      Total Protein 7.2      Albumin 3.6      Total Bilirubin 0.5      Alkaline Phosphatase 80      AST 14      ALT <8 (*)     eGFR 18.3 (*)     Anion Gap 11     TROPONIN I HIGH SENSITIVITY    Troponin I High Sensitivity 7     TSH    TSH 1.178     NT-PRO NATRIURETIC PEPTIDE    NT-proBNP 555     MAGNESIUM    Magnesium 2.0       EKG Readings: (Independently Interpreted)   Initial Reading: No STEMI. Rhythm: Sinus Bradycardia. Heart Rate: 44. Ectopy: No Ectopy. Conduction: Normal. ST Segments: Normal ST Segments. T Waves: Normal. Axis: Normal. Clinical Impression: Sinus Bradycardia       Imaging Results    None          Medications   ondansetron disintegrating tablet 8 mg (8 mg Oral Given 2/2/25 0822)   meclizine tablet 50 mg (50 mg Oral Given 2/2/25 0822)     Medical Decision Making  Amount and/or Complexity of Data Reviewed  Labs: ordered.    Risk  Prescription drug management.               ED Course as of 02/02/25 0913   Sun Feb 02, 2025   0911 Laboratory values are fairly unremarkable.  Kidney functions are at baseline.  Much improved after medications.  Stable for discharge and follow up to primary care physician [SD]      ED Course User Index  [SD] Craig Winters MD               Medical Decision Making:    Differential Diagnosis:   Dizziness, nausea, vertigo             Clinical Impression:  Final diagnoses:  [R42] Dizziness  [R00.1] Sinus bradycardia, chronic (Primary)          ED Disposition Condition    Discharge Stable          ED Prescriptions       Medication Sig Dispense Start Date End Date Auth. Provider    meclizine (ANTIVERT) 25 mg tablet Take 1 tablet (25 mg total) by mouth 3 (three) times daily as needed for Dizziness. 20 tablet 2/2/2025 -- Craig Winters MD    ondansetron (ZOFRAN-ODT) 8 MG TbDL Take 1 tablet (8 mg total) by mouth every 6 (six) hours as needed (Nausea). 21 tablet 2/2/2025 -- Craig Winters MD          Follow-up Information       Follow up With Specialties Details Why Contact Info Additional Information    Primary care physician  In 2 days       [facility] - Emergency Department Emergency Medicine  As needed, If symptoms worsen [address] [facility] [id number] [phone] Floor [address]    Amanda Guzman MD Internal Medicine In 2 days  [address] [place] [address]  [phone]                Craig Winters MD  02/02/25 0919

## 2025-03-19 ENCOUNTER — HOSPITAL ENCOUNTER (EMERGENCY)
Facility: HOSPITAL | Age: 80
Discharge: HOME | End: 2025-03-19
Attending: EMERGENCY MEDICINE
Payer: MEDICARE

## 2025-03-19 VITALS
RESPIRATION RATE: 20 BRPM | WEIGHT: 183 LBS | HEART RATE: 50 BPM | DIASTOLIC BLOOD PRESSURE: 73 MMHG | OXYGEN SATURATION: 97 % | BODY MASS INDEX: 28.72 KG/M2 | HEIGHT: 67 IN | TEMPERATURE: 98 F | SYSTOLIC BLOOD PRESSURE: 143 MMHG

## 2025-03-19 DIAGNOSIS — M25.562 KNEE PAIN, LEFT: ICD-10-CM

## 2025-03-19 DIAGNOSIS — M13.862 OTHER SPECIFIED ARTHRITIS, LEFT KNEE: Primary | ICD-10-CM

## 2025-03-19 PROCEDURE — 25000003 PHARM REV CODE 250: Performed by: CLINICAL NURSE SPECIALIST

## 2025-03-19 PROCEDURE — 99283 EMERGENCY DEPT VISIT LOW MDM: CPT | Mod: 25

## 2025-03-19 RX ORDER — METHOCARBAMOL 500 MG/1
1000 TABLET, FILM COATED ORAL 3 TIMES DAILY
Qty: 30 TABLET | Refills: 0 | Status: SHIPPED | OUTPATIENT
Start: 2025-03-19 | End: 2025-03-24

## 2025-03-19 RX ORDER — METHOCARBAMOL 500 MG/1
1000 TABLET, FILM COATED ORAL ONCE
Status: COMPLETED | OUTPATIENT
Start: 2025-03-19 | End: 2025-03-19

## 2025-03-19 RX ADMIN — METHOCARBAMOL 1000 MG: 500 TABLET ORAL at 01:03

## 2025-03-19 NOTE — ED PROVIDER NOTES
Encounter Date: 3/19/2025       History     Chief Complaint   Patient presents with    Knee Pain     Patient states that she got stuck in the bathtub last night due to knee pain. She states that she slid back to her room on her stomach. Reports bilateral knee pain with left knee being worse. Patient took Tylenol Arthritis at 9 am.      80-year-old female presents emergency room with bilateral knee pain, worse in the left.  Patient states she had some knee pain last night and she had to side from the bathroom to her bed.  Patient previously had right knee surgery.  Patient walks with the assistance of a cane and a walker.  Use over-the-counter arthritis medication at 9 in a.m. with no relief        Review of patient's allergies indicates:   Allergen Reactions    Penicillins Rash    Sulfa (sulfonamide antibiotics) Rash     Past Medical History:   Diagnosis Date    Arthritis     Asthma     Cataract     BILATERAL    Diabetes mellitus, type 2     Encounter for annual wellness exam in Medicare patient 6/27/2024    Femoral hernia     H/O: Bell's palsy     Hiatal hernia     Hypertension     Wears dentures     FULL     Past Surgical History:   Procedure Laterality Date    HYSTERECTOMY      OOPHORECTOMY      right knee surgery       Family History   Problem Relation Name Age of Onset    Brain Hemorrhage Mother  33    No Known Problems Father      No Known Problems Sister      Ovarian cancer Daughter      No Known Problems Maternal Aunt      No Known Problems Maternal Uncle      No Known Problems Paternal Aunt      No Known Problems Paternal Uncle      No Known Problems Maternal Grandmother      No Known Problems Maternal Grandfather      No Known Problems Paternal Grandmother      No Known Problems Paternal Grandfather      Heart disease Brother      Heart disease Brother      Heart disease Brother      Breast cancer Neg Hx      BRCA 1/2 Neg Hx       Social History[1]  Review of Systems   Constitutional:  Negative for fever.    HENT:  Negative for sore throat.    Respiratory:  Negative for shortness of breath.    Cardiovascular:  Negative for chest pain.   Gastrointestinal:  Negative for nausea.   Genitourinary:  Negative for dysuria.   Musculoskeletal:  Positive for arthralgias, gait problem, joint swelling and myalgias. Negative for back pain.   Skin:  Negative for rash.   Neurological:  Negative for weakness.   Hematological:  Does not bruise/bleed easily.   All other systems reviewed and are negative.      Physical Exam     Initial Vitals [03/19/25 1302]   BP Pulse Resp Temp SpO2   (!) 143/73 (!) 50 20 97.6 °F (36.4 °C) 97 %      MAP       --         Physical Exam    Nursing note and vitals reviewed.  Constitutional: She appears well-developed and well-nourished.   HENT:   Head: Normocephalic and atraumatic.   Eyes: Pupils are equal, round, and reactive to light.   Neck:   Normal range of motion.  Musculoskeletal:         General: Tenderness present.      Cervical back: Normal range of motion.      Comments: Pain elicited with bending of left lower extremity.  Patient reports pain with ambulation.     Neurological: She is alert and oriented to person, place, and time.   Skin: Skin is warm and dry.   Psychiatric: She has a normal mood and affect.         ED Course   Procedures  Labs Reviewed - No data to display       Imaging Results              X-Ray Knee 1 or 2 View Left (In process)  Result time 03/19/25 13:42:37                     Medications   methocarbamoL tablet 1,000 mg (1,000 mg Oral Given 3/19/25 1335)     Medical Decision Making  Amount and/or Complexity of Data Reviewed  Radiology: ordered.    Risk  Prescription drug management.                                      Clinical Impression:  Final diagnoses:  [M25.562] Knee pain, left  [M13.862] Other specified arthritis, left knee (Primary)          ED Disposition Condition    Discharge Stable          ED Prescriptions       Medication Sig Dispense Start Date End Date Auth.  Provider    methocarbamoL (ROBAXIN) 500 MG Tab Take 2 tablets (1,000 mg total) by mouth 3 (three) times daily. for 5 days 30 tablet 3/19/2025 3/24/2025 Wendi Mcclelland NP          Follow-up Information    None            [1]   Social History  Tobacco Use    Smoking status: Never    Smokeless tobacco: Never   Substance Use Topics    Alcohol use: No    Drug use: No        Wendi Mcclelland NP  03/19/25 6865

## 2025-03-19 NOTE — DISCHARGE INSTRUCTIONS
Preliminary read of x-ray shows arthritis.  Consult was placed for Orthopedics.  Should receive a phone call in a few days to set up an appointment.

## 2025-04-01 ENCOUNTER — OFFICE VISIT (OUTPATIENT)
Dept: ORTHOPEDICS | Facility: CLINIC | Age: 80
End: 2025-04-01
Payer: MEDICARE

## 2025-04-01 VITALS
HEART RATE: 55 BPM | RESPIRATION RATE: 18 BRPM | DIASTOLIC BLOOD PRESSURE: 73 MMHG | OXYGEN SATURATION: 96 % | SYSTOLIC BLOOD PRESSURE: 159 MMHG

## 2025-04-01 DIAGNOSIS — M25.561 ACUTE PAIN OF RIGHT KNEE: Primary | ICD-10-CM

## 2025-04-01 DIAGNOSIS — M17.11 PRIMARY OSTEOARTHRITIS OF RIGHT KNEE: ICD-10-CM

## 2025-04-01 DIAGNOSIS — M13.862 OTHER SPECIFIED ARTHRITIS, LEFT KNEE: ICD-10-CM

## 2025-04-01 PROCEDURE — 99999 PR PBB SHADOW E&M-EST. PATIENT-LVL IV: CPT | Mod: PBBFAC,,, | Performed by: NURSE PRACTITIONER

## 2025-04-01 NOTE — PROGRESS NOTES
Subjective:      Edyta Gomez is a 80 y.o. female referred by [facility] ER for evaluation and treatment of left knee pain. She states onset has been off and on for several years. She states that the pain has worsened over the past few weeks. She presented to the [facility] ER on 03/19/25 due to increased pain in her left knee. Radiographs were done, showing moderate DJD changes and she was referred to orthopedics. She has treated with Tylenol.    She presents today and is noted with a mild limp. She is using a walker. She rates the pain as 5/10. The pain's location is medial > lateral. She describes the symptoms as aching. She states that the knee does not have much pain today. She has difficulty bending the knee at times. Symptoms worsen with weight bearing and ROM. She denies locking but is having bouts of instability. She also states that her right knee bothers her at times as well. She states that she had previous surgery done to the RT knee > 40  years ago and has chronic swelling. She reports mild pain presently.      Outside reports reviewed: ER notes, x-ray     Medical History:  Past Medical History:   Diagnosis Date    Arthritis     Asthma     Cataract     BILATERAL    Diabetes mellitus, type 2     Encounter for annual wellness exam in Medicare patient 6/27/2024    Femoral hernia     H/O: Bell's palsy     Hiatal hernia     Hypertension     Wears dentures     FULL       Surgical History:  Past Surgical History:   Procedure Laterality Date    HYSTERECTOMY      OOPHORECTOMY      right knee surgery         Family History:  Family History   Problem Relation Name Age of Onset    Brain Hemorrhage Mother  33    No Known Problems Father      No Known Problems Sister      Ovarian cancer Daughter      No Known Problems Maternal Aunt      No Known Problems Maternal Uncle      No Known Problems Paternal Aunt      No Known Problems Paternal Uncle      No Known Problems Maternal Grandmother      No Known Problems Maternal  Grandfather      No Known Problems Paternal Grandmother      No Known Problems Paternal Grandfather      Heart disease Brother      Heart disease Brother      Heart disease Brother      Breast cancer Neg Hx      BRCA 1/2 Neg Hx         Allergies:   Review of patient's allergies indicates:   Allergen Reactions    Penicillins Rash    Sulfa (sulfonamide antibiotics) Rash     Review of Systems   Constitutional: Negative.  Negative for fever.   Musculoskeletal:  Positive for joint pain.   Skin: Negative.    Neurological: Negative.    Psychiatric/Behavioral: Negative.     All other systems reviewed and are negative.      Objective:      NVI  General :   alert, appears stated age, and cooperative   Gait: Antalgic.    Left Lower Extremity  Hip Palpation:  no tenderness over the greater  trochanter   Hip ROM: No groin pain.   Knee Effusion:  trace   Ecchymosis:  none   Knee ROM:  5 to 110 degrees with subpatellar crepitance, pain with flexion.   Patella:  Patella does track normally.  Patellar apprehension test: negative  Patellar compression test: negative   Tenderness: medial joint line > lateral joint line   Stability:  Lachman's test: negative  Posterior drawer: negative  Medial collateral ligament: negative  Lateral collateral ligament: negative     Charissa Test:  negative   Elmria's Test:  Guarded- medial knee   Sensation:   intact to light touch   Pulses: normal DP and PT pulses   Contralateral knee- mild effusion. AROM , + crepitus. Joint line tenderness.     Imaging  Radiographs Left knee were reviewed from 03/19/25  No fracture or dislocation. Moderate medial joint space narrowing and osteophytes. Mild patellofemoral degenerative change. Multiple calcified loose bodies in a popliteal fossa up to 2.5 cm.     RT knee 2 V ordered and pending:    Assessment:      LT knee pain, DJD  RT knee pain, DJD     Plan:      1. Radiographs ordered and pending for the RT knee.   2. Start directed physician guided exercises  for ROM and strengthening for B/L knees- [employer] knee conditioning packet given with exercises reviewed. HEP 17902 - She was instructed and demonstrated exercises per [employer] knee rehab exercises for HEP which include Heel cord stretch, standing quad stretch, supine hamstring stretch, half squats, hamstring curls, calf raises, leg extensions, straight leg raises, hip abduction, hip adduction and  leg presses. The patient then demonstrated understanding of exercises and proper technique. This program was performed for 15 minutes. Will be done 3 x wk x 6 wks.   3. Compression and ice prn.  4. Limit stairs, squatting and single leg twisting.   5. Tylenol as previous.   6. Follow up: 4 weeks for follow up. Consider CSI if needed.    7. She had no further questions.

## 2025-04-07 ENCOUNTER — HOSPITAL ENCOUNTER (OUTPATIENT)
Dept: RADIOLOGY | Facility: HOSPITAL | Age: 80
Discharge: HOME | End: 2025-04-07
Attending: NURSE PRACTITIONER
Payer: MEDICARE

## 2025-04-07 DIAGNOSIS — M13.862 OTHER SPECIFIED ARTHRITIS, LEFT KNEE: ICD-10-CM

## 2025-04-07 DIAGNOSIS — M17.11 PRIMARY OSTEOARTHRITIS OF RIGHT KNEE: ICD-10-CM

## 2025-04-07 PROCEDURE — 73560 X-RAY EXAM OF KNEE 1 OR 2: CPT | Mod: TC,RT

## 2025-04-27 ENCOUNTER — HOSPITAL ENCOUNTER (EMERGENCY)
Facility: HOSPITAL | Age: 80
Discharge: HOME | End: 2025-04-27
Attending: EMERGENCY MEDICINE
Payer: MEDICARE

## 2025-04-27 VITALS
OXYGEN SATURATION: 100 % | HEART RATE: 51 BPM | RESPIRATION RATE: 18 BRPM | BODY MASS INDEX: 30.89 KG/M2 | HEIGHT: 65 IN | SYSTOLIC BLOOD PRESSURE: 109 MMHG | WEIGHT: 185.44 LBS | TEMPERATURE: 99 F | DIASTOLIC BLOOD PRESSURE: 67 MMHG

## 2025-04-27 DIAGNOSIS — N17.9 AKI (ACUTE KIDNEY INJURY): ICD-10-CM

## 2025-04-27 DIAGNOSIS — R79.89 ELEVATED TROPONIN: ICD-10-CM

## 2025-04-27 DIAGNOSIS — R07.9 CHEST PAIN, UNSPECIFIED TYPE: Primary | ICD-10-CM

## 2025-04-27 PROBLEM — F32.9 REACTIVE DEPRESSION: Status: ACTIVE | Noted: 2025-04-27

## 2025-04-27 PROBLEM — I21.4 NSTEMI (NON-ST ELEVATED MYOCARDIAL INFARCTION): Status: ACTIVE | Noted: 2025-04-27

## 2025-04-27 LAB
ABSOLUTE EOSINOPHIL (OHS): 0.16 K/UL
ABSOLUTE MONOCYTE (OHS): 0.56 K/UL (ref 0.3–1)
ABSOLUTE NEUTROPHIL COUNT (OHS): 4.77 K/UL (ref 1.8–7.7)
ALBUMIN SERPL BCP-MCNC: 3.7 G/DL (ref 3.5–5.2)
ALP SERPL-CCNC: 77 UNIT/L (ref 40–150)
ALT SERPL W/O P-5'-P-CCNC: <8 UNIT/L (ref 10–44)
ANION GAP (OHS): 14 MMOL/L (ref 8–16)
AST SERPL-CCNC: 15 UNIT/L (ref 11–45)
BASOPHILS # BLD AUTO: 0.06 K/UL
BASOPHILS NFR BLD AUTO: 0.7 %
BILIRUB SERPL-MCNC: 0.8 MG/DL (ref 0.1–1)
BUN SERPL-MCNC: 51 MG/DL (ref 8–23)
CALCIUM SERPL-MCNC: 9.8 MG/DL (ref 8.7–10.5)
CHLORIDE SERPL-SCNC: 104 MMOL/L (ref 95–110)
CO2 SERPL-SCNC: 21 MMOL/L (ref 23–29)
CREAT SERPL-MCNC: 4.1 MG/DL (ref 0.5–1.4)
ERYTHROCYTE [DISTWIDTH] IN BLOOD BY AUTOMATED COUNT: 15.1 % (ref 11.5–14.5)
GFR SERPLBLD CREATININE-BSD FMLA CKD-EPI: 11 ML/MIN/1.73/M2
GLUCOSE SERPL-MCNC: 99 MG/DL (ref 70–110)
HCT VFR BLD AUTO: 38.2 % (ref 37–48.5)
HGB BLD-MCNC: 12.9 GM/DL (ref 12–16)
HOLD SPECIMEN: NORMAL
IMM GRANULOCYTES # BLD AUTO: 0.02 K/UL (ref 0–0.04)
IMM GRANULOCYTES NFR BLD AUTO: 0.2 % (ref 0–0.5)
LYMPHOCYTES # BLD AUTO: 2.62 K/UL (ref 1–4.8)
MAGNESIUM SERPL-MCNC: 1.9 MG/DL (ref 1.6–2.6)
MCH RBC QN AUTO: 31.6 PG (ref 27–31)
MCHC RBC AUTO-ENTMCNC: 33.8 G/DL (ref 32–36)
MCV RBC AUTO: 94 FL (ref 82–98)
NUCLEATED RBC (/100WBC) (OHS): 0 /100 WBC
OHS QRS DURATION: 82 MS
OHS QTC CALCULATION: 443 MS
PLATELET # BLD AUTO: 250 K/UL (ref 150–450)
PMV BLD AUTO: 10 FL (ref 9.2–12.9)
POTASSIUM SERPL-SCNC: 4.4 MMOL/L (ref 3.5–5.1)
PROT SERPL-MCNC: 7.6 GM/DL (ref 6–8.4)
RBC # BLD AUTO: 4.08 M/UL (ref 4–5.4)
RELATIVE EOSINOPHIL (OHS): 2 %
RELATIVE LYMPHOCYTE (OHS): 32 % (ref 18–48)
RELATIVE MONOCYTE (OHS): 6.8 % (ref 4–15)
RELATIVE NEUTROPHIL (OHS): 58.3 % (ref 38–73)
SODIUM SERPL-SCNC: 139 MMOL/L (ref 136–145)
TROPONIN I SERPL HS-MCNC: 14 NG/L
TROPONIN I SERPL HS-MCNC: 84 NG/L
WBC # BLD AUTO: 8.19 K/UL (ref 3.9–12.7)

## 2025-04-27 PROCEDURE — 93005 ELECTROCARDIOGRAM TRACING: CPT

## 2025-04-27 PROCEDURE — 85025 COMPLETE CBC W/AUTO DIFF WBC: CPT | Performed by: EMERGENCY MEDICINE

## 2025-04-27 PROCEDURE — 84484 ASSAY OF TROPONIN QUANT: CPT | Performed by: EMERGENCY MEDICINE

## 2025-04-27 PROCEDURE — 93010 ELECTROCARDIOGRAM REPORT: CPT | Mod: ,,, | Performed by: INTERNAL MEDICINE

## 2025-04-27 PROCEDURE — 25000003 PHARM REV CODE 250: Performed by: EMERGENCY MEDICINE

## 2025-04-27 PROCEDURE — 36415 COLL VENOUS BLD VENIPUNCTURE: CPT | Performed by: EMERGENCY MEDICINE

## 2025-04-27 PROCEDURE — 83735 ASSAY OF MAGNESIUM: CPT | Performed by: EMERGENCY MEDICINE

## 2025-04-27 PROCEDURE — 99285 EMERGENCY DEPT VISIT HI MDM: CPT | Mod: 25

## 2025-04-27 PROCEDURE — 82040 ASSAY OF SERUM ALBUMIN: CPT | Performed by: EMERGENCY MEDICINE

## 2025-04-27 RX ORDER — ASPIRIN 325 MG
325 TABLET ORAL
Status: COMPLETED | OUTPATIENT
Start: 2025-04-27 | End: 2025-04-27

## 2025-04-27 RX ADMIN — SODIUM CHLORIDE 500 ML: 9 INJECTION, SOLUTION INTRAVENOUS at 03:04

## 2025-04-27 RX ADMIN — ASPIRIN 325 MG ORAL TABLET 325 MG: 325 PILL ORAL at 02:04

## 2025-04-27 NOTE — ED PROVIDER NOTES
EMERGENCY DEPARTMENT HISTORY AND PHYSICAL EXAM     This note is dictated on M*Modal word recognition program.  There are word recognition mistakes and grammatical errors that are occasionally missed on review.     Date: 4/27/2025   Patient Name: Edyta Gomez       History of Presenting Illness      Chief Complaint   Patient presents with    Chest Pain     Patient reports having chest pain that started 30 min. Ago. C/o substernal chest pain.         1137   Edyta Gomez is a 80 y.o. female with PMHX of diabetes, CKD, AFib on Eliquis Bell's palsy, hypertension who presents to the emergency department C/O chest pain.    Patient presents with chest pain that started at [employer] about 30 minutes prior to arrival.  Symptoms started at rest.  Described as substernal pressure that does not radiate .  No nausea or diaphoresis.  Unsure if she has had prior episodes.  Patient is tearful, so she has been through a lot, reports her grandson recently passed away a few days ago.      Cardiologist: Dr Real    PCP: Amanda Guzman MD      Current Medications[1]        Past History     Past Medical History:   Past Medical History:   Diagnosis Date    Arthritis     Asthma     Cataract     BILATERAL    Diabetes mellitus, type 2     Encounter for annual wellness exam in Medicare patient 6/27/2024    Femoral hernia     H/O: Bell's palsy     Hiatal hernia     Hypertension     Wears dentures     FULL        Past Surgical History:   Past Surgical History:   Procedure Laterality Date    HYSTERECTOMY      OOPHORECTOMY      right knee surgery          Family History:   Family History   Problem Relation Name Age of Onset    Brain Hemorrhage Mother  33    No Known Problems Father      No Known Problems Sister      Ovarian cancer Daughter      No Known Problems Maternal Aunt      No Known Problems Maternal Uncle      No Known Problems Paternal Aunt      No Known Problems Paternal Uncle      No Known Problems Maternal Grandmother      No  Known Problems Maternal Grandfather      No Known Problems Paternal Grandmother      No Known Problems Paternal Grandfather      Heart disease Brother      Heart disease Brother      Heart disease Brother      Breast cancer Neg Hx      BRCA 1/2 Neg Hx          Social History:   Social History[2]     Allergies:   Review of patient's allergies indicates:   Allergen Reactions    Penicillins Rash    Sulfa (sulfonamide antibiotics) Rash          Review of Systems   Review of Systems   See HPI for pertinent positives and negatives       Physical Exam     Vitals:    04/27/25 1415 04/27/25 1416 04/27/25 1432 04/27/25 1502   BP: (!) 83/48 (!) 90/53 (!) 92/50 109/67   BP Location:       Pulse: (!) 47 (!) 47 (!) 47 (!) 51   Resp: 18 18     Temp:       TempSrc:       SpO2: 97%  99% 100%   Weight:       Height:          Physical Exam  Vitals and nursing note reviewed.   Constitutional:       General: She is not in acute distress.     Appearance: Normal appearance. She is not ill-appearing.   HENT:      Head: Normocephalic and atraumatic.      Right Ear: External ear normal.      Left Ear: External ear normal.      Nose: Nose normal. No congestion or rhinorrhea.      Mouth/Throat:      Mouth: Mucous membranes are moist.   Eyes:      Conjunctiva/sclera: Conjunctivae normal.      Pupils: Pupils are equal, round, and reactive to light.   Cardiovascular:      Rate and Rhythm: Regular rhythm. Bradycardia present.      Heart sounds: Normal heart sounds. No murmur heard.  Pulmonary:      Effort: Pulmonary effort is normal. No respiratory distress.      Breath sounds: Normal breath sounds.   Chest:      Chest wall: No tenderness.   Abdominal:      Palpations: Abdomen is soft.   Musculoskeletal:         General: No deformity. Normal range of motion.      Cervical back: Normal range of motion. No rigidity.      Right lower leg: No tenderness. No edema.      Left lower leg: No edema.   Skin:     General: Skin is dry.   Neurological:       Mental Status: She is alert and oriented to person, place, and time. Mental status is at baseline.      Comments: Right facial droop chronic   Psychiatric:         Mood and Affect: Mood is anxious. Affect is tearful.         Behavior: Behavior normal.              Diagnostic Study Results      Labs -   Recent Results (from the past 12 hours)   Comprehensive metabolic panel    Collection Time: 04/27/25 11:36 AM   Result Value Ref Range    Sodium 139 136 - 145 mmol/L    Potassium 4.4 3.5 - 5.1 mmol/L    Chloride 104 95 - 110 mmol/L    CO2 21 (L) 23 - 29 mmol/L    Glucose 99 70 - 110 mg/dL    BUN 51 (H) 8 - 23 mg/dL    Creatinine 4.1 (H) 0.5 - 1.4 mg/dL    Calcium 9.8 8.7 - 10.5 mg/dL    Protein Total 7.6 6.0 - 8.4 gm/dL    Albumin 3.7 3.5 - 5.2 g/dL    Bilirubin Total 0.8 0.1 - 1.0 mg/dL    ALP 77 40 - 150 unit/L    AST 15 11 - 45 unit/L    ALT <8 (L) 10 - 44 unit/L    Anion Gap 14 8 - 16 mmol/L    eGFR 11 (L) >60 mL/min/1.73/m2   TROPONIN I HIGH SENSITIVITY    Collection Time: 04/27/25 11:36 AM   Result Value Ref Range    Troponin High Sensitive 14 <=14 ng/L   CBC with Differential    Collection Time: 04/27/25 11:36 AM   Result Value Ref Range    WBC 8.19 3.90 - 12.70 K/uL    RBC 4.08 4.00 - 5.40 M/uL    HGB 12.9 12.0 - 16.0 gm/dL    HCT 38.2 37.0 - 48.5 %    MCV 94 82 - 98 fL    MCH 31.6 (H) 27.0 - 31.0 pg    MCHC 33.8 32.0 - 36.0 g/dL    RDW 15.1 (H) 11.5 - 14.5 %    Platelet Count 250 150 - 450 K/uL    MPV 10.0 9.2 - 12.9 fL    Nucleated RBC 0 <=0 /100 WBC    Neut % 58.3 38 - 73 %    Lymph % 32.0 18 - 48 %    Mono % 6.8 4 - 15 %    Eos % 2.0 <=8 %    Basophil % 0.7 <=1.9 %    Imm Grans % 0.2 0.0 - 0.5 %    Neut # 4.77 1.8 - 7.7 K/uL    Lymph # 2.62 1 - 4.8 K/uL    Mono # 0.56 0.3 - 1 K/uL    Eos # 0.16 <=0.5 K/uL    Baso # 0.06 <=0.2 K/uL    Imm Grans # 0.02 0.00 - 0.04 K/uL   Magnesium    Collection Time: 04/27/25 11:36 AM   Result Value Ref Range    Magnesium  1.9 1.6 - 2.6 mg/dL   Light Blue Top Hold     Collection Time: 04/27/25 11:36 AM   Result Value Ref Range    Extra Tube hold    Troponin I High Sensitivity    Collection Time: 04/27/25  1:28 PM   Result Value Ref Range    Troponin High Sensitive 84 (H) <=14 ng/L        Radiologic Studies -    X-Ray Chest 1 View   Final Result      Possible small left pleural effusion      Moderately enlarged cardiac silhouette suggests cardiomegaly and/or pericardial effusion.  Size of the cardiac silhouette is similar however to 03/08/2024         Electronically signed by: Renée Head MD   Date:    04/27/2025   Time:    13:27           Medications given in the ED-   Medications   sodium chloride 0.9% bolus 500 mL 500 mL (500 mLs Intravenous New Bag 4/27/25 1504)   aspirin tablet 325 mg (325 mg Oral Given 4/27/25 1425)           Medical Decision Making    I am the first provider for this patient.     I reviewed the vital signs, available nursing notes, past medical history, past surgical history, family history and social history.     Vital Signs:  Reviewed the patient's vital signs.     Pulse Oximetry Analysis and Interpretation:    100% on Room Air, normal    EKG Interpretation: (Per my independent interpretation, pending formal read)   Interpreted by Brian Vergara MD at 1134   Sinus bradycardia rate of 44, low voltage, no STEMI , unchanged from prior    CXR  Interpretation: (Per my independent interpretation, pending formal read)   CXR read by Dr. Brian Vergara at     Cardiomegaly, no pleural effusion, no infiltrate, similar to prior     External Test Results (Pertinent to encounter):    Records Reviewed: Nursing Notes, Current Prescription Medications, Old Medical Records, and External Medical Records     History Obtained By: Patient    Provider Notes: Edyta Gomez is a 80 y.o. female with chest pain    Co-morbidities Considered:  Diabetes, hyperlipidemia, hypertension    Differential Diagnosis: ACS, cardiomyopathy, MSK, Anxiety      ED Course:    2:42  PM  Patient presents with chest pain.  Described as heaviness radiates to her back.  She reports increased stressors to grandson's passing.  EKG nonischemic and unchanged from prior study.  Labs were notable for increase in patient's creatinine from 2.6 to 4.1 concerning for acute on chronic kidney injury  First troponin shortly after arrival was at normal limits.  Repeat troponin showed increase. Has been negative in the past.   She is on Eliquis. 325 ASA ordered,   Re-evaluated patient.  Denies complaints in his chest pain-free at this time.  Discussed with her results.  Evaluation is concerning for angina/NSTEMI and patient would benefit from cardiology evaluation with access to cath lab    2:53 PM  Pt accepted to [facility]         Problems Addressed:  Chest pain, stone    Procedures:   Procedures       Diagnosis and Disposition     Critical Care:      3:27 PM       I have spent 31 minutes of critical care time involved in lab review, consultations with specialist, family decision-making, and documentation.  During this entire length of time I was immediately available to the patient.     Critical Care:  The reason for providing this level of medical care for this critically ill patient was due a critical illness that impaired one or more vital organ systems such that there was a high probability of imminent or life threatening deterioration in the patients condition. This care involved high complexity decision making to assess, manipulate, and support vital system functions, to treat this degreee vital organ system failure and to prevent further life threatening deterioration of the patients condition.               CLINICAL IMPRESSION:         1. Chest pain, unspecified type    2. Elevated troponin    3. STONE (acute kidney injury)              PLAN:   1. Transfer  2.      Medication List        ASK your doctor about these medications      amiodarone 200 MG Tab  Commonly known as: PACERONE  Take 0.5  tablets (100 mg total) by mouth once daily.     dapagliflozin propanediol 10 mg tablet  Commonly known as: FARXIGA  Take 1 tablet (10 mg total) by mouth once daily.     ELIQUIS 5 mg Tab  Generic drug: apixaban  Take 1 tablet (5 mg total) by mouth 2 (two) times daily.     furosemide 40 MG tablet  Commonly known as: LASIX  Take 1 tablet (40 mg total) by mouth 2 (two) times daily.     isosorbide mononitrate 30 MG 24 hr tablet  Commonly known as: IMDUR  Take 1 tablet (30 mg total) by mouth once daily.     lisinopriL 20 MG tablet  Commonly known as: PRINIVIL,ZESTRIL     lovastatin 20 MG tablet  Commonly known as: MEVACOR  Take 1 tablet (20 mg total) by mouth every evening.     meclizine 25 mg tablet  Commonly known as: ANTIVERT  Take 1 tablet (25 mg total) by mouth 3 (three) times daily as needed for Dizziness.     ondansetron 8 MG Tbdl  Commonly known as: ZOFRAN-ODT  Take 1 tablet (8 mg total) by mouth every 6 (six) hours as needed (Nausea).     potassium chloride 20 mEq  Commonly known as: K-TAB     spironolactone 25 MG tablet  Commonly known as: ALDACTONE  Take 1 tablet (25 mg total) by mouth once daily.     VITAMIN D3 1000 units Tab  Generic drug: vitamin D             3. No follow-up provider specified.     _______________________________     Please note that this dictation was completed with [employer], the computer voice recognition software.  Quite often unanticipated grammatical, syntax, homophones, and other interpretive errors are inadvertently transcribed by the computer software.  Please disregard these errors.  Please excuse any errors that have escaped final proofreading.               [1]   Current Facility-Administered Medications   Medication Dose Route Frequency Provider Last Rate Last Admin    sodium chloride 0.9% bolus 500 mL 500 mL  500 mL Intravenous ED 1 Time Brian Vergara  mL/hr at 04/27/25 1504 500 mL at 04/27/25 1504     Current Outpatient Medications   Medication Sig Dispense Refill     amiodarone (PACERONE) 200 MG Tab Take 0.5 tablets (100 mg total) by mouth once daily. 15 tablet 11    dapagliflozin propanediol (FARXIGA) 10 mg tablet Take 1 tablet (10 mg total) by mouth once daily.      ELIQUIS 5 mg Tab Take 1 tablet (5 mg total) by mouth 2 (two) times daily. 180 tablet 2    furosemide (LASIX) 40 MG tablet Take 1 tablet (40 mg total) by mouth 2 (two) times daily. 60 tablet 11    isosorbide mononitrate (IMDUR) 30 MG 24 hr tablet Take 1 tablet (30 mg total) by mouth once daily. 30 tablet 11    lisinopriL (PRINIVIL,ZESTRIL) 20 MG tablet Take 20 mg by mouth once daily.      lovastatin (MEVACOR) 20 MG tablet Take 1 tablet (20 mg total) by mouth every evening. 90 tablet 3    meclizine (ANTIVERT) 25 mg tablet Take 1 tablet (25 mg total) by mouth 3 (three) times daily as needed for Dizziness. 20 tablet 0    ondansetron (ZOFRAN-ODT) 8 MG TbDL Take 1 tablet (8 mg total) by mouth every 6 (six) hours as needed (Nausea). 21 tablet 0    potassium chloride (K-TAB) 20 mEq Take 20 mEq by mouth once daily. (Patient not taking: Reported on 2/28/2025)      spironolactone (ALDACTONE) 25 MG tablet Take 1 tablet (25 mg total) by mouth once daily. 30 tablet 11    vitamin D (VITAMIN D3) 1000 units Tab Take 185 mg by mouth once daily.     [2]   Social History  Tobacco Use    Smoking status: Never    Smokeless tobacco: Never   Substance Use Topics    Alcohol use: No    Drug use: No        Brian Vergara MD  04/27/25 9820

## 2025-05-09 PROBLEM — Z09 HOSPITAL DISCHARGE FOLLOW-UP: Status: ACTIVE | Noted: 2025-05-09

## 2025-07-07 ENCOUNTER — LAB VISIT (OUTPATIENT)
Dept: LAB | Facility: HOSPITAL | Age: 80
End: 2025-07-07
Attending: INTERNAL MEDICINE
Payer: MEDICARE

## 2025-07-07 DIAGNOSIS — E11.9 TYPE 2 DIABETES MELLITUS WITHOUT COMPLICATION, WITHOUT LONG-TERM CURRENT USE OF INSULIN: ICD-10-CM

## 2025-07-07 LAB
ABSOLUTE EOSINOPHIL (OHS): 0.42 K/UL
ABSOLUTE MONOCYTE (OHS): 0.67 K/UL (ref 0.3–1)
ABSOLUTE NEUTROPHIL COUNT (OHS): 4.36 K/UL (ref 1.8–7.7)
ALBUMIN SERPL BCP-MCNC: 3.5 G/DL (ref 3.5–5.2)
ALP SERPL-CCNC: 106 UNIT/L (ref 40–150)
ALT SERPL W/O P-5'-P-CCNC: 8 UNIT/L (ref 10–44)
ANION GAP (OHS): 8 MMOL/L (ref 8–16)
AST SERPL-CCNC: 17 UNIT/L (ref 11–45)
BASOPHILS # BLD AUTO: 0.07 K/UL
BASOPHILS NFR BLD AUTO: 1 %
BILIRUB SERPL-MCNC: 0.7 MG/DL (ref 0.1–1)
BUN SERPL-MCNC: 19 MG/DL (ref 8–23)
CALCIUM SERPL-MCNC: 9.4 MG/DL (ref 8.7–10.5)
CHLORIDE SERPL-SCNC: 106 MMOL/L (ref 95–110)
CO2 SERPL-SCNC: 29 MMOL/L (ref 23–29)
CREAT SERPL-MCNC: 1.9 MG/DL (ref 0.5–1.4)
ERYTHROCYTE [DISTWIDTH] IN BLOOD BY AUTOMATED COUNT: 15.6 % (ref 11.5–14.5)
GFR SERPLBLD CREATININE-BSD FMLA CKD-EPI: 27 ML/MIN/1.73/M2
GLUCOSE SERPL-MCNC: 78 MG/DL (ref 70–110)
HCT VFR BLD AUTO: 38.1 % (ref 37–48.5)
HGB BLD-MCNC: 12.2 GM/DL (ref 12–16)
IMM GRANULOCYTES # BLD AUTO: 0.01 K/UL (ref 0–0.04)
IMM GRANULOCYTES NFR BLD AUTO: 0.1 % (ref 0–0.5)
LYMPHOCYTES # BLD AUTO: 1.5 K/UL (ref 1–4.8)
MCH RBC QN AUTO: 31.1 PG (ref 27–31)
MCHC RBC AUTO-ENTMCNC: 32 G/DL (ref 32–36)
MCV RBC AUTO: 97 FL (ref 82–98)
NUCLEATED RBC (/100WBC) (OHS): 0 /100 WBC
PLATELET # BLD AUTO: 253 K/UL (ref 150–450)
PMV BLD AUTO: 10.9 FL (ref 9.2–12.9)
POTASSIUM SERPL-SCNC: 3.7 MMOL/L (ref 3.5–5.1)
PROT SERPL-MCNC: 7.1 GM/DL (ref 6–8.4)
RBC # BLD AUTO: 3.92 M/UL (ref 4–5.4)
RELATIVE EOSINOPHIL (OHS): 6 %
RELATIVE LYMPHOCYTE (OHS): 21.3 % (ref 18–48)
RELATIVE MONOCYTE (OHS): 9.5 % (ref 4–15)
RELATIVE NEUTROPHIL (OHS): 62.1 % (ref 38–73)
SODIUM SERPL-SCNC: 143 MMOL/L (ref 136–145)
WBC # BLD AUTO: 7.03 K/UL (ref 3.9–12.7)

## 2025-07-07 PROCEDURE — 36415 COLL VENOUS BLD VENIPUNCTURE: CPT

## 2025-07-07 PROCEDURE — 85025 COMPLETE CBC W/AUTO DIFF WBC: CPT

## 2025-07-07 PROCEDURE — 82040 ASSAY OF SERUM ALBUMIN: CPT

## 2025-07-11 ENCOUNTER — OFFICE VISIT (OUTPATIENT)
Dept: ORTHOPEDICS | Facility: CLINIC | Age: 80
End: 2025-07-11
Payer: MEDICARE

## 2025-07-11 DIAGNOSIS — M25.561 ACUTE PAIN OF RIGHT KNEE: Primary | ICD-10-CM

## 2025-07-11 DIAGNOSIS — M17.11 PRIMARY OSTEOARTHRITIS OF RIGHT KNEE: ICD-10-CM

## 2025-07-11 PROCEDURE — 1159F MED LIST DOCD IN RCRD: CPT | Mod: CPTII,S$GLB,, | Performed by: NURSE PRACTITIONER

## 2025-07-11 PROCEDURE — 1160F RVW MEDS BY RX/DR IN RCRD: CPT | Mod: CPTII,S$GLB,, | Performed by: NURSE PRACTITIONER

## 2025-07-11 PROCEDURE — 99999 PR PBB SHADOW E&M-EST. PATIENT-LVL II: CPT | Mod: PBBFAC,,, | Performed by: NURSE PRACTITIONER

## 2025-07-11 PROCEDURE — 99213 OFFICE O/P EST LOW 20 MIN: CPT | Mod: S$GLB,,, | Performed by: NURSE PRACTITIONER

## 2025-07-11 NOTE — PROGRESS NOTES
Subjective:     Follow up visit: RT knee pain    Edyta Gomez is a 80 y.o. female returns today with increased RT knee pain. She was seen a few months ago with left knee pain. She states that the left knee has only been with mild pain. She state states that the RT knee has been with increased pain. She denies any recent injuries. She states that she had previous surgery done to the RT knee > 40  years ago and has chronic swelling for years. She has treated with Tylenol.     She presents is noted with a mild limp. She is using a walker. She rates the pain as 5/10 in the RT knee. The pain has been global to the knee. She describes the symptoms as aching. She has difficulty bending the RT knee at times. Symptoms worsen with weight bearing and ROM. She denies locking but is having bouts of instability.        Medical History:       Past Medical History:   Diagnosis Date    Arthritis      Asthma      Cataract       BILATERAL    Diabetes mellitus, type 2      Encounter for annual wellness exam in Medicare patient 6/27/2024    Femoral hernia      H/O: Bell's palsy      Hiatal hernia      Hypertension      Wears dentures       FULL         Surgical History:        Past Surgical History:   Procedure Laterality Date    HYSTERECTOMY        OOPHORECTOMY        right knee surgery             Family History:         Family History   Problem Relation Name Age of Onset    Brain Hemorrhage Mother   33    No Known Problems Father        No Known Problems Sister        Ovarian cancer Daughter        No Known Problems Maternal Aunt        No Known Problems Maternal Uncle        No Known Problems Paternal Aunt        No Known Problems Paternal Uncle        No Known Problems Maternal Grandmother        No Known Problems Maternal Grandfather        No Known Problems Paternal Grandmother        No Known Problems Paternal Grandfather        Heart disease Brother        Heart disease Brother        Heart disease Brother        Breast cancer  Neg Hx        BRCA 1/2 Neg Hx             Allergies:        Review of patient's allergies indicates:   Allergen Reactions    Penicillins Rash    Sulfa (sulfonamide antibiotics) Rash      Review of Systems   Constitutional: Negative.  Negative for fever.   Musculoskeletal:  Positive for joint pain.   Skin: Negative.    Neurological: Negative.    Psychiatric/Behavioral: Negative.     All other systems reviewed and are negative.      Objective:      NVI  General :   alert, appears stated age, and cooperative   Gait: Antalgic.    RT Lower Extremity  Hip Palpation:  no tenderness over the greater  trochanter   Hip ROM: No groin pain.   Knee Effusion:  Mild effusion, prepatellar swelling. Reports chronic enlargement of the RT knee for years.   Ecchymosis:  none   Knee ROM:  5 to 110 degrees with subpatellar crepitance, pain with flexion.   Patella:  Patella does track normally.  Patellar apprehension test: negative  Patellar compression test: negative   Tenderness: medial joint line > lateral joint line   Stability:  Lachman's test: negative  Posterior drawer: negative  Medial collateral ligament: negative  Lateral collateral ligament: negative     Charissa Test:  negative   Elmira's Test:  Guarded- medial knee   Sensation:   intact to light touch   Pulses: normal DP and PT pulses     Imaging  Radiographs RT knee were reviewed from 04/07/25  Marked tricompartmental osteoarthritic change with mild knee joint fluid. No acute radiographic abnormality is identifi     Assessment:     RT knee pain, severe DJD     Plan:      1. Radiographs reviewed RT knee from previous. Severe DJD changes.   2. She states that she only wants conservative treatment.    3. Restart directed physician guided exercises for ROM and strengthening for the knees.  4  Compression and ice prn. Limit stairs, squatting and single leg twisting.   5. Tylenol and walker as previous.   6. Follow up: as directed.   7. She had no further questions.

## 2025-08-22 ENCOUNTER — LAB VISIT (OUTPATIENT)
Dept: LAB | Facility: HOSPITAL | Age: 80
End: 2025-08-22
Attending: INTERNAL MEDICINE
Payer: MEDICARE

## 2025-08-22 DIAGNOSIS — E11.22 DIABETES MELLITUS WITH STAGE 2 CHRONIC KIDNEY DISEASE: Primary | ICD-10-CM

## 2025-08-22 DIAGNOSIS — I12.9 HYPERTENSIVE KIDNEY DISEASE: ICD-10-CM

## 2025-08-22 DIAGNOSIS — N18.4 CHRONIC KIDNEY DISEASE, STAGE IV (SEVERE): ICD-10-CM

## 2025-08-22 DIAGNOSIS — N18.2 DIABETES MELLITUS WITH STAGE 2 CHRONIC KIDNEY DISEASE: Primary | ICD-10-CM

## 2025-08-22 LAB
ABSOLUTE EOSINOPHIL (OHS): 0.32 K/UL
ABSOLUTE MONOCYTE (OHS): 0.63 K/UL (ref 0.3–1)
ABSOLUTE NEUTROPHIL COUNT (OHS): 3.05 K/UL (ref 1.8–7.7)
ALBUMIN SERPL BCP-MCNC: 3.7 G/DL (ref 3.5–5.2)
ALP SERPL-CCNC: 109 UNIT/L (ref 40–150)
ALT SERPL W/O P-5'-P-CCNC: <8 UNIT/L (ref 10–44)
ANION GAP (OHS): 10 MMOL/L (ref 8–16)
AST SERPL-CCNC: 17 UNIT/L (ref 11–45)
BASOPHILS # BLD AUTO: 0.06 K/UL
BASOPHILS NFR BLD AUTO: 1 %
BILIRUB SERPL-MCNC: 0.7 MG/DL (ref 0.1–1)
BUN SERPL-MCNC: 19 MG/DL (ref 8–23)
CALCIUM SERPL-MCNC: 9.5 MG/DL (ref 8.7–10.5)
CHLORIDE SERPL-SCNC: 107 MMOL/L (ref 95–110)
CO2 SERPL-SCNC: 27 MMOL/L (ref 23–29)
CREAT SERPL-MCNC: 2 MG/DL (ref 0.5–1.4)
CREAT UR-MCNC: 116 MG/DL (ref 15–325)
ERYTHROCYTE [DISTWIDTH] IN BLOOD BY AUTOMATED COUNT: 14.6 % (ref 11.5–14.5)
GFR SERPLBLD CREATININE-BSD FMLA CKD-EPI: 25 ML/MIN/1.73/M2
GLUCOSE SERPL-MCNC: 83 MG/DL (ref 70–110)
HCT VFR BLD AUTO: 37 % (ref 37–48.5)
HGB BLD-MCNC: 12.1 GM/DL (ref 12–16)
IMM GRANULOCYTES # BLD AUTO: 0.02 K/UL (ref 0–0.04)
IMM GRANULOCYTES NFR BLD AUTO: 0.3 % (ref 0–0.5)
LYMPHOCYTES # BLD AUTO: 2.11 K/UL (ref 1–4.8)
MCH RBC QN AUTO: 30.6 PG (ref 27–31)
MCHC RBC AUTO-ENTMCNC: 32.7 G/DL (ref 32–36)
MCV RBC AUTO: 94 FL (ref 82–98)
NUCLEATED RBC (/100WBC) (OHS): 0 /100 WBC
PLATELET # BLD AUTO: 223 K/UL (ref 150–450)
PMV BLD AUTO: 9.8 FL (ref 9.2–12.9)
POTASSIUM SERPL-SCNC: 3.5 MMOL/L (ref 3.5–5.1)
PROT SERPL-MCNC: 7.1 GM/DL (ref 6–8.4)
PROT UR-MCNC: 13 MG/DL
PROT/CREAT UR: 0.11 MG/G{CREAT}
RBC # BLD AUTO: 3.95 M/UL (ref 4–5.4)
RELATIVE EOSINOPHIL (OHS): 5.2 %
RELATIVE LYMPHOCYTE (OHS): 34.1 % (ref 18–48)
RELATIVE MONOCYTE (OHS): 10.2 % (ref 4–15)
RELATIVE NEUTROPHIL (OHS): 49.2 % (ref 38–73)
SODIUM SERPL-SCNC: 144 MMOL/L (ref 136–145)
WBC # BLD AUTO: 6.19 K/UL (ref 3.9–12.7)

## 2025-08-22 PROCEDURE — 84156 ASSAY OF PROTEIN URINE: CPT

## 2025-08-22 PROCEDURE — 36415 COLL VENOUS BLD VENIPUNCTURE: CPT

## 2025-08-22 PROCEDURE — 80053 COMPREHEN METABOLIC PANEL: CPT

## 2025-08-22 PROCEDURE — 85025 COMPLETE CBC W/AUTO DIFF WBC: CPT

## (undated) DEVICE — GLOVE BIOGEL ECLIPSE SZ 6.5

## (undated) DEVICE — ELECTRODE FOAM 535 TEARDROP

## (undated) DEVICE — GLOVE BIOGEL ECLIPSE SZ 7

## (undated) DEVICE — WATER IRR STRL USP PLAS 250ML

## (undated) DEVICE — CANNULA NASAL ADULT

## (undated) DEVICE — SHEILD EYE PROTCT BLUE FLEX

## (undated) DEVICE — GLOVE SURG BIOGEL LATEX SZ 7.5